# Patient Record
Sex: FEMALE | Race: BLACK OR AFRICAN AMERICAN | NOT HISPANIC OR LATINO | Employment: FULL TIME | URBAN - METROPOLITAN AREA
[De-identification: names, ages, dates, MRNs, and addresses within clinical notes are randomized per-mention and may not be internally consistent; named-entity substitution may affect disease eponyms.]

---

## 2020-04-21 DIAGNOSIS — Z01.84 ANTIBODY RESPONSE EXAMINATION: ICD-10-CM

## 2020-05-21 DIAGNOSIS — Z01.84 ANTIBODY RESPONSE EXAMINATION: ICD-10-CM

## 2020-06-20 DIAGNOSIS — Z01.84 ANTIBODY RESPONSE EXAMINATION: ICD-10-CM

## 2020-07-20 DIAGNOSIS — Z01.84 ANTIBODY RESPONSE EXAMINATION: ICD-10-CM

## 2020-08-19 DIAGNOSIS — Z01.84 ANTIBODY RESPONSE EXAMINATION: ICD-10-CM

## 2020-09-18 DIAGNOSIS — Z01.84 ANTIBODY RESPONSE EXAMINATION: ICD-10-CM

## 2020-10-18 DIAGNOSIS — Z01.84 ANTIBODY RESPONSE EXAMINATION: ICD-10-CM

## 2020-11-17 DIAGNOSIS — Z01.84 ANTIBODY RESPONSE EXAMINATION: ICD-10-CM

## 2021-01-16 ENCOUNTER — IMMUNIZATION (OUTPATIENT)
Dept: INTERNAL MEDICINE | Facility: CLINIC | Age: 36
End: 2021-01-16
Payer: COMMERCIAL

## 2021-01-16 DIAGNOSIS — Z23 NEED FOR VACCINATION: Primary | ICD-10-CM

## 2021-01-16 PROCEDURE — 91300 COVID-19, MRNA, LNP-S, PF, 30 MCG/0.3 ML DOSE VACCINE: CPT | Mod: PBBFAC

## 2021-01-27 ENCOUNTER — OFFICE VISIT (OUTPATIENT)
Dept: OPTOMETRY | Facility: CLINIC | Age: 36
End: 2021-01-27
Payer: COMMERCIAL

## 2021-01-27 DIAGNOSIS — Z04.9 DISEASE RULED OUT AFTER EXAMINATION: ICD-10-CM

## 2021-01-27 DIAGNOSIS — H52.203 ASTIGMATISM OF BOTH EYES, UNSPECIFIED TYPE: Primary | ICD-10-CM

## 2021-01-27 PROCEDURE — 99999 PR PBB SHADOW E&M-EST. PATIENT-LVL II: ICD-10-PCS | Mod: PBBFAC,,, | Performed by: OPTOMETRIST

## 2021-01-27 PROCEDURE — 92015 DETERMINE REFRACTIVE STATE: CPT | Mod: S$GLB,,, | Performed by: OPTOMETRIST

## 2021-01-27 PROCEDURE — 92004 COMPRE OPH EXAM NEW PT 1/>: CPT | Mod: S$GLB,,, | Performed by: OPTOMETRIST

## 2021-01-27 PROCEDURE — 92015 PR REFRACTION: ICD-10-PCS | Mod: S$GLB,,, | Performed by: OPTOMETRIST

## 2021-01-27 PROCEDURE — 92004 PR EYE EXAM, NEW PATIENT,COMPREHESV: ICD-10-PCS | Mod: S$GLB,,, | Performed by: OPTOMETRIST

## 2021-01-27 PROCEDURE — 99999 PR PBB SHADOW E&M-EST. PATIENT-LVL II: CPT | Mod: PBBFAC,,, | Performed by: OPTOMETRIST

## 2021-01-27 RX ORDER — LABETALOL 200 MG/1
200 TABLET, FILM COATED ORAL 2 TIMES DAILY
COMMUNITY
End: 2022-06-14 | Stop reason: ALTCHOICE

## 2021-02-06 ENCOUNTER — IMMUNIZATION (OUTPATIENT)
Dept: INTERNAL MEDICINE | Facility: CLINIC | Age: 36
End: 2021-02-06
Payer: MEDICAID

## 2021-02-06 DIAGNOSIS — Z23 NEED FOR VACCINATION: Primary | ICD-10-CM

## 2021-02-06 PROCEDURE — 91300 COVID-19, MRNA, LNP-S, PF, 30 MCG/0.3 ML DOSE VACCINE: ICD-10-PCS | Mod: ,,, | Performed by: INTERNAL MEDICINE

## 2021-02-06 PROCEDURE — 0002A COVID-19, MRNA, LNP-S, PF, 30 MCG/0.3 ML DOSE VACCINE: CPT | Mod: CV19,,, | Performed by: INTERNAL MEDICINE

## 2021-02-06 PROCEDURE — 91300 COVID-19, MRNA, LNP-S, PF, 30 MCG/0.3 ML DOSE VACCINE: CPT | Mod: ,,, | Performed by: INTERNAL MEDICINE

## 2021-02-06 PROCEDURE — 0002A COVID-19, MRNA, LNP-S, PF, 30 MCG/0.3 ML DOSE VACCINE: ICD-10-PCS | Mod: CV19,,, | Performed by: INTERNAL MEDICINE

## 2021-04-28 ENCOUNTER — PATIENT MESSAGE (OUTPATIENT)
Dept: RESEARCH | Facility: HOSPITAL | Age: 36
End: 2021-04-28

## 2021-12-21 ENCOUNTER — LAB VISIT (OUTPATIENT)
Dept: PRIMARY CARE CLINIC | Facility: OTHER | Age: 36
End: 2021-12-21

## 2021-12-21 DIAGNOSIS — R53.83 FATIGUE, UNSPECIFIED TYPE: Primary | ICD-10-CM

## 2021-12-21 DIAGNOSIS — R53.83 FATIGUE, UNSPECIFIED TYPE: ICD-10-CM

## 2021-12-21 LAB
CTP QC/QA: YES
SARS-COV-2 RDRP RESP QL NAA+PROBE: POSITIVE

## 2021-12-21 PROCEDURE — U0002: ICD-10-PCS | Mod: QW,,, | Performed by: PREVENTIVE MEDICINE

## 2021-12-21 PROCEDURE — U0002 COVID-19 LAB TEST NON-CDC: HCPCS | Mod: QW,,, | Performed by: PREVENTIVE MEDICINE

## 2021-12-24 ENCOUNTER — PATIENT MESSAGE (OUTPATIENT)
Dept: PRIMARY CARE CLINIC | Facility: CLINIC | Age: 36
End: 2021-12-24
Payer: MEDICAID

## 2022-06-10 ENCOUNTER — TELEPHONE (OUTPATIENT)
Dept: OBSTETRICS AND GYNECOLOGY | Facility: CLINIC | Age: 37
End: 2022-06-10
Payer: COMMERCIAL

## 2022-06-10 NOTE — TELEPHONE ENCOUNTER
----- Message from Meera Pond sent at 6/10/2022  2:54 PM CDT -----   Name of Who is Calling:     What is the request in detail: patient request call back in reference to  new patient appointment  Please contact to further discuss and advise      Can the clinic reply by [facility]:     [facility]  [phone]

## 2022-06-10 NOTE — TELEPHONE ENCOUNTER
----- Message from Meera Pond sent at 6/10/2022  2:58 PM CDT -----   Name of Who is Calling:     What is the request in detail:  patient request call back in reference to new patient appointment Please contact to further discuss and advise      Can the clinic reply by [facility]:     [facility]  [phone]

## 2022-06-13 NOTE — TELEPHONE ENCOUNTER
Returned call to the patient. Patient scheduled to see Dr. Montana and will keep that appointment for her annual well woman.

## 2022-06-14 ENCOUNTER — OFFICE VISIT (OUTPATIENT)
Dept: PRIMARY CARE CLINIC | Facility: CLINIC | Age: 37
End: 2022-06-14
Payer: COMMERCIAL

## 2022-06-14 VITALS
SYSTOLIC BLOOD PRESSURE: 149 MMHG | BODY MASS INDEX: 35.19 KG/M2 | TEMPERATURE: 99 F | WEIGHT: 191.25 LBS | DIASTOLIC BLOOD PRESSURE: 91 MMHG | HEIGHT: 62 IN | HEART RATE: 73 BPM | OXYGEN SATURATION: 100 %

## 2022-06-14 DIAGNOSIS — N76.0 ACUTE VAGINITIS: Primary | ICD-10-CM

## 2022-06-14 DIAGNOSIS — Z11.3 SCREEN FOR STD (SEXUALLY TRANSMITTED DISEASE): ICD-10-CM

## 2022-06-14 DIAGNOSIS — I10 UNCONTROLLED HYPERTENSION: ICD-10-CM

## 2022-06-14 PROCEDURE — 87591 N.GONORRHOEAE DNA AMP PROB: CPT | Performed by: FAMILY MEDICINE

## 2022-06-14 PROCEDURE — 3077F PR MOST RECENT SYSTOLIC BLOOD PRESSURE >= 140 MM HG: ICD-10-PCS | Mod: CPTII,S$GLB,, | Performed by: FAMILY MEDICINE

## 2022-06-14 PROCEDURE — 1159F PR MEDICATION LIST DOCUMENTED IN MEDICAL RECORD: ICD-10-PCS | Mod: CPTII,S$GLB,, | Performed by: FAMILY MEDICINE

## 2022-06-14 PROCEDURE — 87481 CANDIDA DNA AMP PROBE: CPT | Mod: 59 | Performed by: FAMILY MEDICINE

## 2022-06-14 PROCEDURE — 99203 OFFICE O/P NEW LOW 30 MIN: CPT | Mod: S$GLB,,, | Performed by: FAMILY MEDICINE

## 2022-06-14 PROCEDURE — 3077F SYST BP >= 140 MM HG: CPT | Mod: CPTII,S$GLB,, | Performed by: FAMILY MEDICINE

## 2022-06-14 PROCEDURE — 99203 PR OFFICE/OUTPT VISIT, NEW, LEVL III, 30-44 MIN: ICD-10-PCS | Mod: S$GLB,,, | Performed by: FAMILY MEDICINE

## 2022-06-14 PROCEDURE — 99999 PR PBB SHADOW E&M-EST. PATIENT-LVL III: ICD-10-PCS | Mod: PBBFAC,,, | Performed by: FAMILY MEDICINE

## 2022-06-14 PROCEDURE — 1159F MED LIST DOCD IN RCRD: CPT | Mod: CPTII,S$GLB,, | Performed by: FAMILY MEDICINE

## 2022-06-14 PROCEDURE — 1160F RVW MEDS BY RX/DR IN RCRD: CPT | Mod: CPTII,S$GLB,, | Performed by: FAMILY MEDICINE

## 2022-06-14 PROCEDURE — 3080F DIAST BP >= 90 MM HG: CPT | Mod: CPTII,S$GLB,, | Performed by: FAMILY MEDICINE

## 2022-06-14 PROCEDURE — 87801 DETECT AGNT MULT DNA AMPLI: CPT | Performed by: FAMILY MEDICINE

## 2022-06-14 PROCEDURE — 3008F PR BODY MASS INDEX (BMI) DOCUMENTED: ICD-10-PCS | Mod: CPTII,S$GLB,, | Performed by: FAMILY MEDICINE

## 2022-06-14 PROCEDURE — 1160F PR REVIEW ALL MEDS BY PRESCRIBER/CLIN PHARMACIST DOCUMENTED: ICD-10-PCS | Mod: CPTII,S$GLB,, | Performed by: FAMILY MEDICINE

## 2022-06-14 PROCEDURE — 3008F BODY MASS INDEX DOCD: CPT | Mod: CPTII,S$GLB,, | Performed by: FAMILY MEDICINE

## 2022-06-14 PROCEDURE — 99999 PR PBB SHADOW E&M-EST. PATIENT-LVL III: CPT | Mod: PBBFAC,,, | Performed by: FAMILY MEDICINE

## 2022-06-14 PROCEDURE — 3080F PR MOST RECENT DIASTOLIC BLOOD PRESSURE >= 90 MM HG: ICD-10-PCS | Mod: CPTII,S$GLB,, | Performed by: FAMILY MEDICINE

## 2022-06-14 PROCEDURE — 87491 CHLMYD TRACH DNA AMP PROBE: CPT | Mod: 59 | Performed by: FAMILY MEDICINE

## 2022-06-14 RX ORDER — FLUCONAZOLE 150 MG/1
150 TABLET ORAL DAILY
Qty: 1 TABLET | Refills: 0 | Status: SHIPPED | OUTPATIENT
Start: 2022-06-14 | End: 2022-06-15

## 2022-06-14 RX ORDER — METOPROLOL SUCCINATE 25 MG/1
25 TABLET, EXTENDED RELEASE ORAL DAILY
Qty: 30 TABLET | Refills: 1 | Status: SHIPPED | OUTPATIENT
Start: 2022-06-14 | End: 2022-07-07

## 2022-06-14 RX ORDER — METRONIDAZOLE 500 MG/1
500 TABLET ORAL EVERY 12 HOURS
Qty: 14 TABLET | Refills: 0 | Status: SHIPPED | OUTPATIENT
Start: 2022-06-14 | End: 2022-06-21

## 2022-06-14 NOTE — PROGRESS NOTES
Subjective:       Patient ID: Gina Dickson is a 36 y.o. female.    Chief Complaint: vaginal problems    HPI:  Patient reports having unprotected sex 5 days ago and has a creamy vaginal discharge with fishy odor x 4 days.  She has history of recurrent vaginitis.  Patient also with chronic HTN. She admits to non-compliance with medication.  Review of Systems   Constitutional: Negative for fever.   Gastrointestinal: Rectal pain: vaginitis.   Genitourinary: Positive for vaginal discharge. Negative for dysuria, pelvic pain and vaginal pain.   Hematological: Bruises/bleeds easily.         Objective:      Physical Exam  Constitutional:       Appearance: Normal appearance.   Pulmonary:      Effort: Pulmonary effort is normal.   Abdominal:      Palpations: Abdomen is soft.      Tenderness: There is no abdominal tenderness.   Genitourinary:     Labia:         Right: No lesion.         Left: No rash or lesion.       Vagina: Vaginal discharge (minimal white discharge) present. No erythema.      Cervix: No friability, lesion or erythema.   Neurological:      Mental Status: She is alert.   Psychiatric:         Mood and Affect: Mood normal.         Assessment:       Problem List Items Addressed This Visit    None     Visit Diagnoses     Acute vaginitis    -  Primary    Relevant Medications    metroNIDAZOLE (FLAGYL) 500 MG tablet    Other Relevant Orders    Vaginosis Screen by DNA Probe (Completed)    Uncontrolled hypertension        Relevant Medications    metoprolol succinate (TOPROL-XL) 25 MG 24 hr tablet    Screen for STD (sexually transmitted disease)        Relevant Orders    C. trachomatis/N. gonorrhoeae by AMP DNA (Completed)          Plan:     Gina was seen today for vaginal problems.    Diagnoses and all orders for this visit:    Acute vaginitis  -     Vaginosis Screen by DNA Probe  -     metroNIDAZOLE (FLAGYL) 500 MG tablet; Take 1 tablet (500 mg total) by mouth every 12 (twelve) hours. for 7 days  -      fluconazole (DIFLUCAN) 150 MG Tab; Take 1 tablet (150 mg total) by mouth once daily. for 1 day    Uncontrolled hypertension  -     metoprolol succinate (TOPROL-XL) 25 MG 24 hr tablet; Take 1 tablet (25 mg total) by mouth once daily.    Screen for STD (sexually transmitted disease)  -     C. trachomatis/N. gonorrhoeae by AMP DNA

## 2022-06-17 LAB
BACTERIAL VAGINOSIS DNA: POSITIVE
C TRACH DNA SPEC QL NAA+PROBE: NOT DETECTED
CANDIDA GLABRATA DNA: NEGATIVE
CANDIDA KRUSEI DNA: NEGATIVE
CANDIDA RRNA VAG QL PROBE: NEGATIVE
N GONORRHOEA DNA SPEC QL NAA+PROBE: NOT DETECTED
T VAGINALIS RRNA GENITAL QL PROBE: NEGATIVE

## 2022-06-29 ENCOUNTER — PATIENT MESSAGE (OUTPATIENT)
Dept: PRIMARY CARE CLINIC | Facility: CLINIC | Age: 37
End: 2022-06-29
Payer: COMMERCIAL

## 2022-07-07 ENCOUNTER — TELEPHONE (OUTPATIENT)
Dept: OBSTETRICS AND GYNECOLOGY | Facility: CLINIC | Age: 37
End: 2022-07-07
Payer: COMMERCIAL

## 2022-07-07 ENCOUNTER — PATIENT MESSAGE (OUTPATIENT)
Dept: OBSTETRICS AND GYNECOLOGY | Facility: CLINIC | Age: 37
End: 2022-07-07
Payer: COMMERCIAL

## 2022-07-07 NOTE — TELEPHONE ENCOUNTER
----- Message from Rosa Isela Patel sent at 7/7/2022  8:29 AM CDT -----  Regarding: Reschedule appointment  Who Called:LATRICIA MACK [[id number]]        Who Left Message for Patient:         Does the patient know what this is regarding?        Best Call Back Number: [phone]        Additional Information:

## 2022-07-12 ENCOUNTER — CLINICAL SUPPORT (OUTPATIENT)
Dept: PRIMARY CARE CLINIC | Facility: CLINIC | Age: 37
End: 2022-07-12
Payer: COMMERCIAL

## 2022-07-12 VITALS — SYSTOLIC BLOOD PRESSURE: 122 MMHG | HEART RATE: 71 BPM | DIASTOLIC BLOOD PRESSURE: 78 MMHG | OXYGEN SATURATION: 97 %

## 2022-07-12 DIAGNOSIS — I10 ESSENTIAL HYPERTENSION: Primary | ICD-10-CM

## 2022-07-12 PROCEDURE — 99499 UNLISTED E&M SERVICE: CPT | Mod: S$GLB,,, | Performed by: FAMILY MEDICINE

## 2022-07-12 PROCEDURE — 99499 NO LOS: ICD-10-PCS | Mod: S$GLB,,, | Performed by: FAMILY MEDICINE

## 2022-07-12 PROCEDURE — 99999 PR PBB SHADOW E&M-EST. PATIENT-LVL II: CPT | Mod: PBBFAC,,,

## 2022-07-12 PROCEDURE — 99999 PR PBB SHADOW E&M-EST. PATIENT-LVL II: ICD-10-PCS | Mod: PBBFAC,,,

## 2022-07-12 RX ORDER — FLUCONAZOLE 150 MG/1
150 TABLET ORAL
Qty: 1 TABLET | Refills: 0 | Status: SHIPPED | OUTPATIENT
Start: 2022-07-12 | End: 2022-07-12 | Stop reason: SDUPTHER

## 2022-07-12 RX ORDER — METRONIDAZOLE 7.5 MG/G
1 GEL VAGINAL NIGHTLY
Qty: 70 G | Refills: 0 | Status: SHIPPED | OUTPATIENT
Start: 2022-07-12 | End: 2022-07-12

## 2022-07-12 NOTE — PROGRESS NOTES
"Gina Dickson 36 y.o. female is here today for Blood Pressure check.   History of HTN yes.    Review of patient's allergies indicates:  No Known Allergies  Creatinine   Date Value Ref Range Status   05/09/2016 0.7 0.5 - 1.4 mg/dL Final     Sodium   Date Value Ref Range Status   05/09/2016 138 136 - 145 mmol/L Final     Potassium   Date Value Ref Range Status   05/09/2016 3.7 3.5 - 5.1 mmol/L Final   ]  Patient denies taking blood pressure medications on a regular basis at the same time of the day.     Current Outpatient Medications:     cetirizine (ZYRTEC) 10 MG tablet, Take 1 tablet (10 mg total) by mouth every evening. for 10 days, Disp: 10 tablet, Rfl: 0    metoprolol succinate (TOPROL-XL) 25 MG 24 hr tablet, TAKE 1 TABLET BY MOUTH EVERY DAY, Disp: 30 tablet, Rfl: 1  Does patient have record of home blood pressure readings no. .   Last dose of blood pressure medication was taken at N/A. Patient states she hasn"t started  Medication yet.  Patient is asymptomatic.   Complains of none..    Vitals:    07/12/22 1117   BP: 122/78   BP Location: Left arm   Patient Position: Sitting   BP Method: Medium (Manual)   Pulse: 71   SpO2: 97%         Dr. Santoro  informed of nurse visit.     "

## 2022-07-12 NOTE — PROGRESS NOTES
Pt seen on 6/14/22 for vaginitis. Swabs positive for BV, treated with fluconazole x1 and oral metronidazole.    Pt apparently still symptomatic. She reports that symptoms are most likely a yeast infection, thick white creamy discharge, mild pruritis/discomfirt. Does not feel like BV. Normally gets fluconazole x3 times. Discussed with pt new prescription for fluconazole x3 times sent to pharmacy. If still no improvement, will switch to terconazole vaginal cream.

## 2022-07-13 RX ORDER — FLUCONAZOLE 150 MG/1
150 TABLET ORAL
Qty: 3 TABLET | Refills: 0 | Status: SHIPPED | OUTPATIENT
Start: 2022-07-13 | End: 2022-08-24

## 2022-07-13 RX ORDER — METRONIDAZOLE 7.5 MG/G
1 GEL VAGINAL NIGHTLY
Qty: 70 G | Refills: 0 | Status: CANCELLED | OUTPATIENT
Start: 2022-07-13 | End: 2022-07-18

## 2022-07-18 DIAGNOSIS — I10 UNCONTROLLED HYPERTENSION: ICD-10-CM

## 2022-07-18 RX ORDER — METOPROLOL SUCCINATE 25 MG/1
25 TABLET, EXTENDED RELEASE ORAL DAILY
Qty: 30 TABLET | Refills: 1 | Status: SHIPPED | OUTPATIENT
Start: 2022-07-18 | End: 2022-08-24 | Stop reason: SDUPTHER

## 2022-07-19 ENCOUNTER — PATIENT MESSAGE (OUTPATIENT)
Dept: PRIMARY CARE CLINIC | Facility: CLINIC | Age: 37
End: 2022-07-19
Payer: COMMERCIAL

## 2022-07-21 ENCOUNTER — TELEPHONE (OUTPATIENT)
Dept: PRIMARY CARE CLINIC | Facility: CLINIC | Age: 37
End: 2022-07-21
Payer: COMMERCIAL

## 2022-08-11 ENCOUNTER — PATIENT MESSAGE (OUTPATIENT)
Dept: OBSTETRICS AND GYNECOLOGY | Facility: CLINIC | Age: 37
End: 2022-08-11

## 2022-08-11 ENCOUNTER — LAB VISIT (OUTPATIENT)
Dept: PRIMARY CARE CLINIC | Facility: CLINIC | Age: 37
End: 2022-08-11
Payer: COMMERCIAL

## 2022-08-11 ENCOUNTER — OFFICE VISIT (OUTPATIENT)
Dept: OBSTETRICS AND GYNECOLOGY | Facility: CLINIC | Age: 37
End: 2022-08-11
Payer: COMMERCIAL

## 2022-08-11 VITALS
BODY MASS INDEX: 32.86 KG/M2 | WEIGHT: 178.56 LBS | DIASTOLIC BLOOD PRESSURE: 70 MMHG | SYSTOLIC BLOOD PRESSURE: 120 MMHG | HEIGHT: 62 IN

## 2022-08-11 DIAGNOSIS — Z11.3 SCREEN FOR STD (SEXUALLY TRANSMITTED DISEASE): ICD-10-CM

## 2022-08-11 DIAGNOSIS — N89.8 VAGINAL DISCHARGE: ICD-10-CM

## 2022-08-11 DIAGNOSIS — Z11.3 SCREEN FOR STD (SEXUALLY TRANSMITTED DISEASE): Primary | ICD-10-CM

## 2022-08-11 PROCEDURE — 36415 PR COLLECTION VENOUS BLOOD,VENIPUNCTURE: ICD-10-PCS | Mod: S$GLB,,, | Performed by: OBSTETRICS & GYNECOLOGY

## 2022-08-11 PROCEDURE — 3078F DIAST BP <80 MM HG: CPT | Mod: CPTII,S$GLB,, | Performed by: OBSTETRICS & GYNECOLOGY

## 2022-08-11 PROCEDURE — 3074F SYST BP LT 130 MM HG: CPT | Mod: CPTII,S$GLB,, | Performed by: OBSTETRICS & GYNECOLOGY

## 2022-08-11 PROCEDURE — 86695 HERPES SIMPLEX TYPE 1 TEST: CPT | Performed by: OBSTETRICS & GYNECOLOGY

## 2022-08-11 PROCEDURE — 87389 HIV-1 AG W/HIV-1&-2 AB AG IA: CPT | Performed by: OBSTETRICS & GYNECOLOGY

## 2022-08-11 PROCEDURE — 3008F BODY MASS INDEX DOCD: CPT | Mod: CPTII,S$GLB,, | Performed by: OBSTETRICS & GYNECOLOGY

## 2022-08-11 PROCEDURE — 36415 COLL VENOUS BLD VENIPUNCTURE: CPT | Performed by: OBSTETRICS & GYNECOLOGY

## 2022-08-11 PROCEDURE — 80074 ACUTE HEPATITIS PANEL: CPT | Performed by: OBSTETRICS & GYNECOLOGY

## 2022-08-11 PROCEDURE — 87591 N.GONORRHOEAE DNA AMP PROB: CPT | Mod: 59 | Performed by: OBSTETRICS & GYNECOLOGY

## 2022-08-11 PROCEDURE — 3044F PR MOST RECENT HEMOGLOBIN A1C LEVEL <7.0%: ICD-10-PCS | Mod: CPTII,S$GLB,, | Performed by: OBSTETRICS & GYNECOLOGY

## 2022-08-11 PROCEDURE — 3074F PR MOST RECENT SYSTOLIC BLOOD PRESSURE < 130 MM HG: ICD-10-PCS | Mod: CPTII,S$GLB,, | Performed by: OBSTETRICS & GYNECOLOGY

## 2022-08-11 PROCEDURE — 86592 SYPHILIS TEST NON-TREP QUAL: CPT | Performed by: OBSTETRICS & GYNECOLOGY

## 2022-08-11 PROCEDURE — 36415 COLL VENOUS BLD VENIPUNCTURE: CPT | Mod: S$GLB,,, | Performed by: OBSTETRICS & GYNECOLOGY

## 2022-08-11 PROCEDURE — 87491 CHLMYD TRACH DNA AMP PROBE: CPT | Performed by: OBSTETRICS & GYNECOLOGY

## 2022-08-11 PROCEDURE — 99203 PR OFFICE/OUTPT VISIT, NEW, LEVL III, 30-44 MIN: ICD-10-PCS | Mod: S$GLB,,, | Performed by: OBSTETRICS & GYNECOLOGY

## 2022-08-11 PROCEDURE — 3044F HG A1C LEVEL LT 7.0%: CPT | Mod: CPTII,S$GLB,, | Performed by: OBSTETRICS & GYNECOLOGY

## 2022-08-11 PROCEDURE — 3078F PR MOST RECENT DIASTOLIC BLOOD PRESSURE < 80 MM HG: ICD-10-PCS | Mod: CPTII,S$GLB,, | Performed by: OBSTETRICS & GYNECOLOGY

## 2022-08-11 PROCEDURE — 87481 CANDIDA DNA AMP PROBE: CPT | Mod: 59 | Performed by: OBSTETRICS & GYNECOLOGY

## 2022-08-11 PROCEDURE — 99999 PR PBB SHADOW E&M-EST. PATIENT-LVL III: ICD-10-PCS | Mod: PBBFAC,,, | Performed by: OBSTETRICS & GYNECOLOGY

## 2022-08-11 PROCEDURE — 99203 OFFICE O/P NEW LOW 30 MIN: CPT | Mod: S$GLB,,, | Performed by: OBSTETRICS & GYNECOLOGY

## 2022-08-11 PROCEDURE — 99999 PR PBB SHADOW E&M-EST. PATIENT-LVL III: CPT | Mod: PBBFAC,,, | Performed by: OBSTETRICS & GYNECOLOGY

## 2022-08-11 PROCEDURE — 3008F PR BODY MASS INDEX (BMI) DOCUMENTED: ICD-10-PCS | Mod: CPTII,S$GLB,, | Performed by: OBSTETRICS & GYNECOLOGY

## 2022-08-12 LAB
HIV 1+2 AB+HIV1 P24 AG SERPL QL IA: NEGATIVE
RPR SER QL: NORMAL

## 2022-08-14 LAB
C TRACH DNA SPEC QL NAA+PROBE: NOT DETECTED
N GONORRHOEA DNA SPEC QL NAA+PROBE: NOT DETECTED

## 2022-08-15 LAB
HSV1 IGG SERPL QL IA: POSITIVE
HSV2 IGG SERPL QL IA: POSITIVE

## 2022-08-16 LAB
BACTERIAL VAGINOSIS DNA: POSITIVE
CANDIDA GLABRATA DNA: NEGATIVE
CANDIDA KRUSEI DNA: NEGATIVE
CANDIDA RRNA VAG QL PROBE: NEGATIVE
T VAGINALIS RRNA GENITAL QL PROBE: NEGATIVE

## 2022-08-18 ENCOUNTER — TELEPHONE (OUTPATIENT)
Dept: OBSTETRICS AND GYNECOLOGY | Facility: CLINIC | Age: 37
End: 2022-08-18
Payer: COMMERCIAL

## 2022-08-18 DIAGNOSIS — B96.89 BV (BACTERIAL VAGINOSIS): Primary | ICD-10-CM

## 2022-08-18 DIAGNOSIS — N76.0 BV (BACTERIAL VAGINOSIS): Primary | ICD-10-CM

## 2022-08-18 RX ORDER — METRONIDAZOLE 7.5 MG/G
1 GEL VAGINAL DAILY
Qty: 1 G | Refills: 0 | Status: SHIPPED | OUTPATIENT
Start: 2022-08-18 | End: 2022-08-25

## 2022-08-19 ENCOUNTER — TELEPHONE (OUTPATIENT)
Dept: OBSTETRICS AND GYNECOLOGY | Facility: CLINIC | Age: 37
End: 2022-08-19
Payer: COMMERCIAL

## 2022-08-22 ENCOUNTER — PATIENT MESSAGE (OUTPATIENT)
Dept: OBSTETRICS AND GYNECOLOGY | Facility: CLINIC | Age: 37
End: 2022-08-22
Payer: COMMERCIAL

## 2022-08-24 ENCOUNTER — OFFICE VISIT (OUTPATIENT)
Dept: PRIMARY CARE CLINIC | Facility: CLINIC | Age: 37
End: 2022-08-24
Payer: COMMERCIAL

## 2022-08-24 ENCOUNTER — LAB VISIT (OUTPATIENT)
Dept: PRIMARY CARE CLINIC | Facility: CLINIC | Age: 37
End: 2022-08-24
Payer: COMMERCIAL

## 2022-08-24 ENCOUNTER — PATIENT OUTREACH (OUTPATIENT)
Dept: ADMINISTRATIVE | Facility: OTHER | Age: 37
End: 2022-08-24
Payer: COMMERCIAL

## 2022-08-24 VITALS
HEART RATE: 65 BPM | OXYGEN SATURATION: 100 % | BODY MASS INDEX: 33.13 KG/M2 | WEIGHT: 181.13 LBS | DIASTOLIC BLOOD PRESSURE: 100 MMHG | SYSTOLIC BLOOD PRESSURE: 140 MMHG

## 2022-08-24 DIAGNOSIS — F41.8 DEPRESSION WITH ANXIETY: ICD-10-CM

## 2022-08-24 DIAGNOSIS — Z82.69 FAMILY HISTORY OF SYSTEMIC LUPUS ERYTHEMATOSUS (SLE) IN MOTHER: ICD-10-CM

## 2022-08-24 DIAGNOSIS — I10 UNCONTROLLED HYPERTENSION: ICD-10-CM

## 2022-08-24 DIAGNOSIS — Z00.00 WELL ADULT EXAM: ICD-10-CM

## 2022-08-24 DIAGNOSIS — Z00.00 WELL ADULT EXAM: Primary | ICD-10-CM

## 2022-08-24 LAB
ALBUMIN SERPL BCP-MCNC: 3.7 G/DL (ref 3.5–5.2)
ALP SERPL-CCNC: 109 U/L (ref 55–135)
ALT SERPL W/O P-5'-P-CCNC: 12 U/L (ref 10–44)
ANION GAP SERPL CALC-SCNC: 6 MMOL/L (ref 8–16)
AST SERPL-CCNC: 16 U/L (ref 10–40)
BILIRUB SERPL-MCNC: 0.6 MG/DL (ref 0.1–1)
BUN SERPL-MCNC: 8 MG/DL (ref 6–20)
CALCIUM SERPL-MCNC: 9.3 MG/DL (ref 8.7–10.5)
CHLORIDE SERPL-SCNC: 106 MMOL/L (ref 95–110)
CHOLEST SERPL-MCNC: 182 MG/DL (ref 120–199)
CHOLEST/HDLC SERPL: 4.1 {RATIO} (ref 2–5)
CO2 SERPL-SCNC: 27 MMOL/L (ref 23–29)
CREAT SERPL-MCNC: 0.8 MG/DL (ref 0.5–1.4)
EST. GFR  (NO RACE VARIABLE): >60 ML/MIN/1.73 M^2
ESTIMATED AVG GLUCOSE: 85 MG/DL (ref 68–131)
GLUCOSE SERPL-MCNC: 89 MG/DL (ref 70–110)
HBA1C MFR BLD: 4.6 % (ref 4–5.6)
HDLC SERPL-MCNC: 44 MG/DL (ref 40–75)
HDLC SERPL: 24.2 % (ref 20–50)
LDLC SERPL CALC-MCNC: 126.4 MG/DL (ref 63–159)
NONHDLC SERPL-MCNC: 138 MG/DL
POTASSIUM SERPL-SCNC: 4.2 MMOL/L (ref 3.5–5.1)
PROT SERPL-MCNC: 7.3 G/DL (ref 6–8.4)
SODIUM SERPL-SCNC: 139 MMOL/L (ref 136–145)
TRIGL SERPL-MCNC: 58 MG/DL (ref 30–150)

## 2022-08-24 PROCEDURE — 3077F PR MOST RECENT SYSTOLIC BLOOD PRESSURE >= 140 MM HG: ICD-10-PCS | Mod: CPTII,S$GLB,, | Performed by: FAMILY MEDICINE

## 2022-08-24 PROCEDURE — 1159F MED LIST DOCD IN RCRD: CPT | Mod: CPTII,S$GLB,, | Performed by: FAMILY MEDICINE

## 2022-08-24 PROCEDURE — 86038 ANTINUCLEAR ANTIBODIES: CPT | Performed by: FAMILY MEDICINE

## 2022-08-24 PROCEDURE — 3008F BODY MASS INDEX DOCD: CPT | Mod: CPTII,S$GLB,, | Performed by: FAMILY MEDICINE

## 2022-08-24 PROCEDURE — 1160F RVW MEDS BY RX/DR IN RCRD: CPT | Mod: CPTII,S$GLB,, | Performed by: FAMILY MEDICINE

## 2022-08-24 PROCEDURE — 1159F PR MEDICATION LIST DOCUMENTED IN MEDICAL RECORD: ICD-10-PCS | Mod: CPTII,S$GLB,, | Performed by: FAMILY MEDICINE

## 2022-08-24 PROCEDURE — 3080F PR MOST RECENT DIASTOLIC BLOOD PRESSURE >= 90 MM HG: ICD-10-PCS | Mod: CPTII,S$GLB,, | Performed by: FAMILY MEDICINE

## 2022-08-24 PROCEDURE — 99395 PR PREVENTIVE VISIT,EST,18-39: ICD-10-PCS | Mod: S$GLB,,, | Performed by: FAMILY MEDICINE

## 2022-08-24 PROCEDURE — 3044F HG A1C LEVEL LT 7.0%: CPT | Mod: CPTII,S$GLB,, | Performed by: FAMILY MEDICINE

## 2022-08-24 PROCEDURE — 3077F SYST BP >= 140 MM HG: CPT | Mod: CPTII,S$GLB,, | Performed by: FAMILY MEDICINE

## 2022-08-24 PROCEDURE — 36415 COLL VENOUS BLD VENIPUNCTURE: CPT | Mod: S$GLB,,, | Performed by: FAMILY MEDICINE

## 2022-08-24 PROCEDURE — 36415 PR COLLECTION VENOUS BLOOD,VENIPUNCTURE: ICD-10-PCS | Mod: S$GLB,,, | Performed by: FAMILY MEDICINE

## 2022-08-24 PROCEDURE — 3008F PR BODY MASS INDEX (BMI) DOCUMENTED: ICD-10-PCS | Mod: CPTII,S$GLB,, | Performed by: FAMILY MEDICINE

## 2022-08-24 PROCEDURE — 80053 COMPREHEN METABOLIC PANEL: CPT | Performed by: FAMILY MEDICINE

## 2022-08-24 PROCEDURE — 1160F PR REVIEW ALL MEDS BY PRESCRIBER/CLIN PHARMACIST DOCUMENTED: ICD-10-PCS | Mod: CPTII,S$GLB,, | Performed by: FAMILY MEDICINE

## 2022-08-24 PROCEDURE — 80061 LIPID PANEL: CPT | Performed by: FAMILY MEDICINE

## 2022-08-24 PROCEDURE — 3044F PR MOST RECENT HEMOGLOBIN A1C LEVEL <7.0%: ICD-10-PCS | Mod: CPTII,S$GLB,, | Performed by: FAMILY MEDICINE

## 2022-08-24 PROCEDURE — 3080F DIAST BP >= 90 MM HG: CPT | Mod: CPTII,S$GLB,, | Performed by: FAMILY MEDICINE

## 2022-08-24 PROCEDURE — 99999 PR PBB SHADOW E&M-EST. PATIENT-LVL IV: ICD-10-PCS | Mod: PBBFAC,,, | Performed by: FAMILY MEDICINE

## 2022-08-24 PROCEDURE — 83036 HEMOGLOBIN GLYCOSYLATED A1C: CPT | Performed by: FAMILY MEDICINE

## 2022-08-24 PROCEDURE — 99395 PREV VISIT EST AGE 18-39: CPT | Mod: S$GLB,,, | Performed by: FAMILY MEDICINE

## 2022-08-24 PROCEDURE — 99999 PR PBB SHADOW E&M-EST. PATIENT-LVL IV: CPT | Mod: PBBFAC,,, | Performed by: FAMILY MEDICINE

## 2022-08-24 RX ORDER — OXYCODONE AND ACETAMINOPHEN 5; 325 MG/1; MG/1
TABLET ORAL
COMMUNITY
End: 2022-08-24

## 2022-08-24 RX ORDER — ACETAMINOPHEN AND CODEINE PHOSPHATE 120; 12 MG/5ML; MG/5ML
1 SOLUTION ORAL DAILY
COMMUNITY
Start: 2022-07-26 | End: 2022-08-24

## 2022-08-24 RX ORDER — METOPROLOL SUCCINATE 25 MG/1
25 TABLET, EXTENDED RELEASE ORAL DAILY
Qty: 30 TABLET | Refills: 1 | Status: SHIPPED | OUTPATIENT
Start: 2022-08-24 | End: 2023-04-10 | Stop reason: SDUPTHER

## 2022-08-24 RX ORDER — CITALOPRAM 20 MG/1
20 TABLET, FILM COATED ORAL DAILY
Qty: 30 TABLET | Refills: 11 | Status: SHIPPED | OUTPATIENT
Start: 2022-08-24 | End: 2023-04-04

## 2022-08-24 NOTE — PROGRESS NOTES
Subjective:       Patient ID: Gina Dickson is a 36 y.o. female.    Chief Complaint: Annual Exam (Wants a check up on everything blood work needs to be done)    HPI:  Here for annual exam.  Patient with uncontrolled HTN since age 18.  Patient non-compliant with medication.  Patient reports depression and anxiety.  Reports 2 miscarriages over past 2 years and death of her aunt in 2020.  Patient reports weight loss 30 lbs with exercise over past 2 months.  Review of Systems   Constitutional: Negative for appetite change, chills, diaphoresis, fatigue and fever.   HENT: Negative for sinus pressure/congestion and trouble swallowing.    Eyes: Negative for pain and visual disturbance.   Respiratory: Negative for cough, chest tightness, shortness of breath and wheezing.    Cardiovascular: Negative for chest pain, palpitations and leg swelling.   Gastrointestinal: Negative for abdominal pain, blood in stool, constipation, diarrhea, nausea and vomiting.   Endocrine: Negative for polydipsia and polyphagia.   Genitourinary: Negative for difficulty urinating, dysuria, hematuria, menstrual problem, pelvic pain and vaginal discharge.   Musculoskeletal: Negative for back pain, joint swelling and neck pain.   Integumentary:  Negative for color change and rash.   Allergic/Immunologic: Negative for environmental allergies and food allergies.   Neurological: Positive for headaches. Negative for dizziness and numbness.   Hematological: Negative for adenopathy. Bruises/bleeds easily.   Psychiatric/Behavioral: Positive for dysphoric mood and sleep disturbance. Negative for suicidal ideas. The patient is nervous/anxious.          Objective:      Physical Exam  Constitutional:       Appearance: She is well-developed. She is obese.   HENT:      Head: Normocephalic and atraumatic.      Right Ear: Tympanic membrane normal.      Left Ear: Tympanic membrane normal.      Nose: Nose normal.      Mouth/Throat:      Mouth: Mucous membranes are  moist.   Neck:      Thyroid: No thyromegaly.   Cardiovascular:      Rate and Rhythm: Normal rate and regular rhythm.      Heart sounds: Normal heart sounds. No murmur heard.  Pulmonary:      Effort: Pulmonary effort is normal. No respiratory distress.      Breath sounds: Normal breath sounds. No wheezing or rales.   Abdominal:      General: Bowel sounds are normal. There is no distension.      Palpations: Abdomen is soft. There is no mass.      Tenderness: There is no abdominal tenderness.   Musculoskeletal:      Cervical back: Normal range of motion and neck supple.      Right lower leg: No edema.      Left lower leg: No edema.   Lymphadenopathy:      Cervical: No cervical adenopathy.   Skin:     General: Skin is warm and dry.      Findings: No rash.   Neurological:      Mental Status: She is alert and oriented to person, place, and time.   Psychiatric:         Attention and Perception: Attention normal.         Mood and Affect: Mood is anxious and depressed.         Speech: Speech normal.         Behavior: Behavior normal.         Thought Content: Thought content includes suicidal ideation. Thought content does not include suicidal plan.         Assessment:       Problem List Items Addressed This Visit        Psychiatric    Depression with anxiety    Relevant Medications    citalopram (CELEXA) 20 MG tablet    Other Relevant Orders    Ambulatory referral/consult to Primary Care Behavioral Health (Non-Opioids)       Cardiac/Vascular    Uncontrolled hypertension    Relevant Medications    metoprolol succinate (TOPROL-XL) 25 MG 24 hr tablet    Other Relevant Orders    Comprehensive Metabolic Panel    Lipid Panel    Hypertension [facility] ([facility]) Enrollment Order (Completed)    Hypertension Digital Medicine ([facility]): Assign Onboarding Questionnaires (Completed)      Other Visit Diagnoses     Well adult exam    -  Primary    Relevant Orders    Hemoglobin A1C    Family history of systemic lupus erythematosus (SLE) in  mother        Relevant Orders    CB Screen w/Reflex          Plan:     Gina was seen today for annual exam.    Diagnoses and all orders for this visit:    Well adult exam  -    Continue exercise.  Monitor diet, low sodium./   Hemoglobin A1C; Future    Uncontrolled hypertension  -     Comprehensive Metabolic Panel; Future  -     Lipid Panel; Future  -     Hypertension Digital Medicine (HDMP) Enrollment Order  -     Hypertension Digital Medicine ([facility]): Assign Onboarding Questionnaires  -     metoprolol succinate (TOPROL-XL) 25 MG 24 hr tablet; Take 1 tablet (25 mg total) by mouth once daily.  RTC 4 weeks for BP check    Depression with anxiety  -     Ambulatory referral/consult to Primary Care Behavioral Health (Non-Opioids); Future  -     citalopram (CELEXA) 20 MG tablet; Take 1 tablet (20 mg total) by mouth once daily.    Family history of systemic lupus erythematosus (SLE) in mother  -     CB Screen w/Reflex; Future

## 2022-08-25 LAB — ANA SER QL IF: NORMAL

## 2022-08-31 ENCOUNTER — PATIENT MESSAGE (OUTPATIENT)
Dept: OBSTETRICS AND GYNECOLOGY | Facility: CLINIC | Age: 37
End: 2022-08-31
Payer: COMMERCIAL

## 2022-08-31 ENCOUNTER — PATIENT OUTREACH (OUTPATIENT)
Dept: ADMINISTRATIVE | Facility: OTHER | Age: 37
End: 2022-08-31
Payer: COMMERCIAL

## 2022-09-01 ENCOUNTER — PATIENT MESSAGE (OUTPATIENT)
Dept: PRIMARY CARE CLINIC | Facility: CLINIC | Age: 37
End: 2022-09-01
Payer: COMMERCIAL

## 2022-09-01 DIAGNOSIS — B37.9 YEAST INFECTION: Primary | ICD-10-CM

## 2022-09-01 LAB
HAV IGM SERPL QL IA: NORMAL
HBV CORE IGM SERPL QL IA: NORMAL
HBV SURFACE AG SERPL QL IA: NORMAL
HCV AB SERPL QL IA: NORMAL

## 2022-09-01 RX ORDER — FLUCONAZOLE 150 MG/1
150 TABLET ORAL ONCE
Qty: 2 TABLET | Refills: 0 | Status: CANCELLED | OUTPATIENT
Start: 2022-09-01 | End: 2022-09-01

## 2022-09-02 ENCOUNTER — PATIENT MESSAGE (OUTPATIENT)
Dept: PRIMARY CARE CLINIC | Facility: CLINIC | Age: 37
End: 2022-09-02
Payer: COMMERCIAL

## 2022-09-02 RX ORDER — FLUCONAZOLE 150 MG/1
150 TABLET ORAL DAILY
Qty: 1 TABLET | Refills: 0 | Status: SHIPPED | OUTPATIENT
Start: 2022-09-02 | End: 2022-09-03

## 2022-09-05 NOTE — PROGRESS NOTES
HISTORY OF PRESENT ILLNESS:    Gina Dickson is a 36 y.o. female  Patient's last menstrual period was 2022. presents today complaining of vaginal discharge for  days.     Past Medical History:   Diagnosis Date    Fibroids     Hypertension        History reviewed. No pertinent surgical history.    MEDICATIONS AND ALLERGIES:      Current Outpatient Medications:     cetirizine (ZYRTEC) 10 MG tablet, Take 1 tablet (10 mg total) by mouth every evening. for 10 days, Disp: 10 tablet, Rfl: 0    citalopram (CELEXA) 20 MG tablet, Take 1 tablet (20 mg total) by mouth once daily., Disp: 30 tablet, Rfl: 11    metoprolol succinate (TOPROL-XL) 25 MG 24 hr tablet, Take 1 tablet (25 mg total) by mouth once daily., Disp: 30 tablet, Rfl: 1    Review of patient's allergies indicates:  No Known Allergies    Family History   Problem Relation Age of Onset    Lupus Mother     Hypertension Father     Cancer Maternal Aunt         colon, breast       Social History     Socioeconomic History    Marital status: Single   Tobacco Use    Smoking status: Former    Smokeless tobacco: Never   Substance and Sexual Activity    Alcohol use: No    Drug use: Yes     Types: Marijuana    Sexual activity: Yes     Partners: Male     Birth control/protection: None     Social Determinants of Health     Financial Resource Strain: Low Risk     Difficulty of Paying Living Expenses: Not hard at all   Food Insecurity: No Food Insecurity    Worried About Running Out of Food in the Last Year: Never true    Ran Out of Food in the Last Year: Never true   Transportation Needs: No Transportation Needs    Lack of Transportation (Medical): No    Lack of Transportation (Non-Medical): No   Physical Activity: Sufficiently Active    Days of Exercise per Week: 7 days    Minutes of Exercise per Session: 60 min   Stress: Stress Concern Present    Feeling of Stress : Very much   Social Connections: Moderately Isolated    Frequency of Communication with Friends  and Family: More than three times a week    Frequency of Social Gatherings with Friends and Family: More than three times a week    Attends [employer] Services: 1 to 4 times per year    Active Member of Clubs or Organizations: No    Attends Club or Organization Meetings: Never    Marital Status: Never    Housing Stability: Low Risk     Unable to Pay for Housing in the Last Year: No    Number of Places Lived in the Last Year: 1    Unstable Housing in the Last Year: No       COMPREHENSIVE GYN HISTORY:  PAP History: Denies abnormal Paps.  Infection History: Denies STDs. Denies PID.  Benign History: Denies uterine fibroids. Denies ovarian cysts. Denies endometriosis. Denies other conditions.  Cancer History: Denies cervical cancer. Denies uterine cancer or hyperplasia. Denies ovarian cancer. Denies vulvar cancer or pre-cancer. Denies vaginal cancer or pre-cancer. Denies breast cancer. Denies colon cancer.  Sexual Activity History: Reports currently being sexually active  Menstrual History: Every 28 days, flows for 4 days. Light flow.  Dysmenorrhea History: Denies dysmenorrhea.      ROS:  GENERAL: No weight changes. No swelling. No fatigue. No fever.  CARDIOVASCULAR: No chest pain. No shortness of breath. No leg cramps.   NEUROLOGICAL: No headaches. No vision changes.  BREASTS: No pain. No lumps. No discharge.  ABDOMEN: No pain. No nausea. No vomiting. No diarrhea. No constipation.  REPRODUCTIVE: No abnormal bleeding.   VULVA: No pain. No lesions. No itching.  VAGINA: No relaxation. No itching. No odor. No discharge. No lesions.  URINARY: No incontinence. No nocturia. No frequency. No dysuria.    PE:  APPEARANCE: Well nourished, well developed, in no acute distress.  AFFECT: WNL, alert and oriented x 3.  ABDOMEN: Soft. No tenderness or masses. No hepatosplenomegaly. No hernias.  PELVIC: Normal external female genitalia without lesions. Normal hair distribution. Adequate perineal body, normal urethral meatus. Vagina  pink and well rugated without lesions or discharge. Cervix pink without lesions, discharge or tenderness. No significant cystocele or rectocele. Bimanual exam shows uterus to be 4-6 weeks size, regular, mobile and nontender. Adnexa without masses or tenderness.    PROCEDURES:  Affirm  GC & CT     DIAGNOSIS:  Vaginitis    PLAN:Awaiting culture results.     COUNSELING:  Please counseled on vaginitis prevention including :  a. avoiding feminine products such as deoderant soaps, body wash, bubble bath, douches, scented toilet paper, deoderant tampons or pads, feminine wipes, chronic pad use, etc.  b. avoiding other vulvovaginal irritants such as long hot baths, humidity, tight, synthetic clothing, chlorine and sitting around in wet bathing suits  c. wearing cotton underwear, avoiding thong underwear and no underwear to bed  d. taking showers instead of baths and use a hair dryer on cool setting afterwards to dry  e. wearing cotton to exercise and shower immediately after exercise and change clothes  f. using polyurethane condoms without spermicide if sexually active and symptoms are triggered by intercourse    FOLLOW-UP with me for annual exam or prn.

## 2022-09-07 ENCOUNTER — PATIENT OUTREACH (OUTPATIENT)
Dept: ADMINISTRATIVE | Facility: OTHER | Age: 37
End: 2022-09-07
Payer: COMMERCIAL

## 2022-09-07 RX ORDER — FLUCONAZOLE 150 MG/1
150 TABLET ORAL ONCE
Qty: 1 TABLET | Refills: 0 | Status: SHIPPED | OUTPATIENT
Start: 2022-09-07 | End: 2022-09-07

## 2022-09-07 NOTE — PROGRESS NOTES
CHW - Follow Up    This Community Health Worker completed a follow up visit with patient via telephone today.  Pt/Caregiver reported: The pt stated that she did not schedule therapist apt yet  Community Health Worker provided: CHW encouraged the client to make an apt.  Follow up required: Yes   Follow-up Outreach - Due: 9/21/2022  CHW - Outreach Attempt    Community Health Worker left a voicemail message for 1st attempt to contact patient regarding: Follow up   Community Health Worker to attempt to contact patient on: 9/7/22

## 2022-10-04 ENCOUNTER — PATIENT MESSAGE (OUTPATIENT)
Dept: PRIMARY CARE CLINIC | Facility: CLINIC | Age: 37
End: 2022-10-04
Payer: COMMERCIAL

## 2022-10-04 ENCOUNTER — PATIENT OUTREACH (OUTPATIENT)
Dept: ADMINISTRATIVE | Facility: OTHER | Age: 37
End: 2022-10-04
Payer: COMMERCIAL

## 2022-10-04 NOTE — PROGRESS NOTES
CHW - Case Closure    This Community Health Worker spoke to patient via telephone today.   Pt/Caregiver reported: CHW provided the pt with information to the Employee Assistance Program.  Pt/Caregiver denied any additional needs at this time and agrees with episode closure at this time.  Provided patient with Community Health Worker's contact information and encouraged him/her to contact this Community Health Worker if additional needs arise.

## 2022-12-28 ENCOUNTER — PATIENT OUTREACH (OUTPATIENT)
Dept: ADMINISTRATIVE | Facility: HOSPITAL | Age: 37
End: 2022-12-28
Payer: COMMERCIAL

## 2023-03-10 ENCOUNTER — HOSPITAL ENCOUNTER (OUTPATIENT)
Dept: RADIOLOGY | Facility: HOSPITAL | Age: 38
Discharge: HOME | End: 2023-03-10
Attending: PHYSICIAN ASSISTANT
Payer: COMMERCIAL

## 2023-03-10 ENCOUNTER — OFFICE VISIT (OUTPATIENT)
Dept: ORTHOPEDICS | Facility: CLINIC | Age: 38
End: 2023-03-10
Payer: COMMERCIAL

## 2023-03-10 DIAGNOSIS — M79.644 FINGER PAIN, RIGHT: Primary | ICD-10-CM

## 2023-03-10 DIAGNOSIS — S63.641A RUPTURE OF ULNAR COLLATERAL LIGAMENT OF RIGHT THUMB, INITIAL ENCOUNTER: Primary | ICD-10-CM

## 2023-03-10 DIAGNOSIS — M79.644 FINGER PAIN, RIGHT: ICD-10-CM

## 2023-03-10 PROCEDURE — 99999 PR PBB SHADOW E&M-EST. PATIENT-LVL III: CPT | Mod: PBBFAC,,, | Performed by: PHYSICIAN ASSISTANT

## 2023-03-10 PROCEDURE — 1159F PR MEDICATION LIST DOCUMENTED IN MEDICAL RECORD: ICD-10-PCS | Mod: CPTII,S$GLB,, | Performed by: PHYSICIAN ASSISTANT

## 2023-03-10 PROCEDURE — 73130 X-RAY EXAM OF HAND: CPT | Mod: 26,RT,, | Performed by: RADIOLOGY

## 2023-03-10 PROCEDURE — 73130 XR HAND COMPLETE 3 VIEW RIGHT: ICD-10-PCS | Mod: 26,RT,, | Performed by: RADIOLOGY

## 2023-03-10 PROCEDURE — 99203 PR OFFICE/OUTPT VISIT, NEW, LEVL III, 30-44 MIN: ICD-10-PCS | Mod: S$GLB,,, | Performed by: PHYSICIAN ASSISTANT

## 2023-03-10 PROCEDURE — 99203 OFFICE O/P NEW LOW 30 MIN: CPT | Mod: S$GLB,,, | Performed by: PHYSICIAN ASSISTANT

## 2023-03-10 PROCEDURE — 99999 PR PBB SHADOW E&M-EST. PATIENT-LVL III: ICD-10-PCS | Mod: PBBFAC,,, | Performed by: PHYSICIAN ASSISTANT

## 2023-03-10 PROCEDURE — 73130 X-RAY EXAM OF HAND: CPT | Mod: TC,RT

## 2023-03-10 PROCEDURE — 1159F MED LIST DOCD IN RCRD: CPT | Mod: CPTII,S$GLB,, | Performed by: PHYSICIAN ASSISTANT

## 2023-03-10 RX ORDER — MELOXICAM 15 MG/1
15 TABLET ORAL DAILY
Qty: 30 TABLET | Refills: 0 | Status: SHIPPED | OUTPATIENT
Start: 2023-03-10 | End: 2023-04-27 | Stop reason: HOSPADM

## 2023-03-10 NOTE — PROGRESS NOTES
[facility]  History & Physical  Orthopedics    SUBJECTIVE:      Chief Complaint: Right thumb    Referring Provider: Dwight Collins Dr. is the supervising physician for this encounter/patient    History of Present Illness:  Patient is a 37 y.o. right hand dominant female who presents today with complaints of right thumb injury occurred on 3/4/23, while on vacation in [place], she was gripping a handrail and slipped/fell, this caused a hyper abduction injury to the thumb. She has noted pain, swelling and numbness/tingling off on in the thumb since the injury. Pain and instability when trying to  something. No surgical history on the hands.     [facility]    Onset of symptoms/DOI was 3/4/23.    Symptoms are aggravated by activity and movement.    Symptoms are alleviated by rest.    Symptoms consist of pain, swelling, decreased ROM, and numbness/tingling.    The patient rates their pain as a 8/10.    Attempted treatment(s) and/or interventions include activity modifications, rest, anti-inflammatory medications.     The patient denies any fevers, chills, N/V, D/C and presents for evaluation.       Past Medical History:   Diagnosis Date    Fibroids     Hypertension      No past surgical history on file.  Review of patient's allergies indicates:  No Known Allergies  Social History     Social History Narrative    Not on file     Family History   Problem Relation Age of Onset    Lupus Mother     Hypertension Father     Cancer Maternal Aunt         colon, breast         Current Outpatient Medications:     cetirizine (ZYRTEC) 10 MG tablet, Take 1 tablet (10 mg total) by mouth every evening. for 10 days, Disp: 10 tablet, Rfl: 0    citalopram (CELEXA) 20 MG tablet, Take 1 tablet (20 mg total) by mouth once daily., Disp: 30 tablet, Rfl: 11    meloxicam (MOBIC) 15 MG tablet, Take 1 tablet (15 mg total) by mouth once daily., Disp: 30 tablet, Rfl: 0    metoprolol  succinate (TOPROL-XL) 25 MG 24 hr tablet, Take 1 tablet (25 mg total) by mouth once daily., Disp: 30 tablet, Rfl: 1      Review of Systems:  Constitutional: no fever or chills  Eyes: no visual changes  ENT: no nasal congestion or sore throat  Respiratory: no cough or shortness of breath  Cardiovascular: no chest pain  Gastrointestinal: no nausea or vomiting, tolerating diet  Musculoskeletal: pain, soreness, numbness/tingling, and decreased ROM    OBJECTIVE:      Vital Signs (Most Recent):  There were no vitals filed for this visit.  There is no height or weight on file to calculate BMI.      Physical Exam:  Constitutional: The patient appears well-developed and well-nourished. No distress.   Skin: No lesions appreciated  Head: Normocephalic and atraumatic.   Nose: Nose normal.   Ears: No deformities seen  Eyes: Conjunctivae and EOM are normal.   Neck: No tracheal deviation present.   Cardiovascular: Normal rate and intact distal pulses.    Pulmonary/Chest: Effort normal. No respiratory distress.   Abdominal: There is no guarding.   Neurological: The patient is alert.   Psychiatric: The patient has a normal mood and affect.     Right Hand/Wrist Examination:    Observation/Inspection:  Swelling  Mild to the thumb    Deformity  none  Discoloration  none     Scars   none    Atrophy  none    HAND/WRIST EXAMINATION:  Finkelstein's Test   Neg  WHAT Test    Neg  Snuff box tenderness   Neg  Man's Test    Neg  Hook of Hamate Tenderness  Neg  CMC grind    Neg  Circumduction test   Neg  Acutely TTP to the ulnar boarder of the thumb MCP joint, TTP to the thumb A1 pulley. Laxity noted to the MCP with ulnar stressing.    Neurovascular Exam:  Digits WWP, brisk CR < 3s throughout  NVI motor/LTS to M/R/U nerves, radial pulse 2+  Tinel's Test - Carpal Tunnel  Neg  Tinel's Test - Cubital Tunnel  Neg  Phalen's Test    Neg  Median Nerve Compression Test Neg    ROM hand: decreased thumb motion due to pain, however, MCP and IP  flexion/extension intact.    ROM wrist full, painless    ROM elbow full, painless    Abdomen not guarded  Respirations nonlabored  Perfusion intact    Diagnostic Results:     Imaging - I independently viewed the patient's imaging as well as the radiology report.      FINDINGS:  No acute fractures.  Preserved bone density.  Preserved joint spaces.  No opaque soft tissue foreign bodies.  Soft tissue swelling dorsally at the level of the metacarpals and MCP articulations.     Impression:     As above    EMG - none    ASSESSMENT/PLAN:      37 y.o. yo female with Right thumb UCL injury    Plan: The patient and I had a thorough discussion today.  We discussed the working diagnosis as well as several other potential alternative diagnoses.  Treatment options were discussed, both conservative and surgical.  Conservative treatment options would include things such as activity modifications, workplace modifications, a period of rest, oral vs topical OTC and prescription anti-inflammatory medications, occupational therapy, splinting/bracing, immobilization, corticosteroid injections, and others.  Surgical options were discussed as well.     At this time, the patient would like to proceed with a trial of thumb spica brace given today, OT referral for custom orthosis and rehab, Mobic ordered today. If no improvement with therapy, MRI to evaluate ligament for possible surgical treatment.    Should the patient's symptoms worsen, persist, or fail to improve they should return for reevaluation and I would be happy to see them back anytime.           Please do not hesitate to reach out to us via email, phone, or MyChart with any questions, concerns, or feedback.

## 2023-03-13 ENCOUNTER — PATIENT OUTREACH (OUTPATIENT)
Dept: ADMINISTRATIVE | Facility: HOSPITAL | Age: 38
End: 2023-03-13
Payer: COMMERCIAL

## 2023-03-16 ENCOUNTER — CLINICAL SUPPORT (OUTPATIENT)
Dept: REHABILITATION | Facility: HOSPITAL | Age: 38
End: 2023-03-16
Payer: COMMERCIAL

## 2023-03-16 DIAGNOSIS — S63.641A RUPTURE OF ULNAR COLLATERAL LIGAMENT OF RIGHT THUMB, INITIAL ENCOUNTER: ICD-10-CM

## 2023-03-16 DIAGNOSIS — M25.641 DECREASED RANGE OF MOTION OF RIGHT THUMB: ICD-10-CM

## 2023-03-16 DIAGNOSIS — M79.644 PAIN OF RIGHT THUMB: ICD-10-CM

## 2023-03-16 PROCEDURE — 97165 OT EVAL LOW COMPLEX 30 MIN: CPT

## 2023-03-16 PROCEDURE — 97530 THERAPEUTIC ACTIVITIES: CPT

## 2023-03-16 PROCEDURE — L3913 HFO W/O JOINTS CF: HCPCS

## 2023-03-16 NOTE — PLAN OF CARE
[facility]  Occupational Therapy Initial Evaluation    Date: 3/16/2023  Name: Gina Dickson  Clinic Number: [id number]    Therapy Diagnosis:   Encounter Diagnoses   Name Primary?    Rupture of ulnar collateral ligament of right thumb, initial encounter     Pain of right thumb     Decreased range of motion of right thumb      Physician: Russo-Digeorge, Jamie L*    Physician Orders: eval and treat, Concern for Right thumb UCL injury, please eval/treat with custom orthosis and rehab.  Medical Diagnosis: Rupture of ulnar collateral ligament of right thumb, initial encounter [S63.641A]  Surgical Procedure and Date: n/a   Date of Injury/Onset: 3/4/23  Evaluation Date: 3/16/23  Insurance Authorization Period Expiration: 3/9/24  Plan of Care Expiration: 5/12/23  Date of Return to MD: not yet scheduled  Visit # / Visits authorized: 1 / 1  FOTO: 3/16/23   69% limitation    Precautions:  Standard and no lateral stress or forceful pinching with thumb    Time In: 11:00  Time Out: 12:00  Total Appointment Time (timed & untimed codes): 60 minutes      SUBJECTIVE     Date of Onset: 3/4/23    History of Current Condition/Mechanism of Injury: Brent reports: hyperabducted thumb while on a trip to [place], she was on climbing and let go and thumb got caught. Pt arrived today in forearm based pre robert thumb spica.She reports increased discomfort while wearing brace. She c/o high pain in thumb and also radiating up her arm.     Falls: no    Involved Side: right  Dominant Side: Left  Imaging:  x-ray on 3/10/23 indicated:  No acute fractures.  Preserved bone density.  Preserved joint spaces.  No opaque soft tissue foreign bodies.  Soft tissue swelling dorsally at the level of the metacarpals and MCP articulations.    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     Current Functional Status   Home/Living environment: lives with their family, but her significant other is off shore right now. (Has a 15 y/o and a 7 y/o)      Limitation of Functional Status as follows:   ADLs/IADLs:     - Feeding: I    - Bathing: more limited and requires increased time    - Dressing/Grooming: difficulty and limited with donning bra    - Driving: I    -currently able to type    -unable to grasp or pinch, limited use of R hand     Leisure: unable to exercise or lift weights    Pain:  Functional Pain Scale Rating 0-10: Current 8/10, worst did not rate, best 4/10   Location: R thumb, ulnar aspect of MP  Description: cold, shooting pains, achy, constant  Aggravating Factors: Flexing, Lifting, and pinching grasping, night time  Easing Factors: nothing     Patient's Goals for Therapy: to get back to regular function and wants to get back to weight lifting    Medical History:   Past Medical History:   Diagnosis Date    Fibroids     Hypertension        Surgical History:    has no past surgical history on file.    Medications:   has a current medication list which includes the following prescription(s): cetirizine, citalopram, meloxicam, and metoprolol succinate.    Allergies:   Review of patient's allergies indicates:  No Known Allergies       OBJECTIVE     Observation/Appearance: presents with pre robert forearm based thumb spica brace. Tenderness noted at ulnar aspect of thumb MP. Appears guarded of R hand.       Edema. Measured in centimeters.   3/16/2023 3/16/2023    Left Right   Thumb:     MP 7.6 7.9   Prox. Phalanx 6.4 6.4   IP 6.2 6.1   Distal Phalanx 5.7 5.9     Elbow and Wrist ROM. Measured in degrees.   3/16/2023 3/16/2023    Left Right   Wrist Ext/Flex WFL WFL   Wrist RD/UD WFL WFL     Hand ROM. Measured in degrees.  Digits 2-5 WNL   3/16/2023 3/16/2023    Left Right             Thumb: MP 57 15/27                IP 52 5       Rad ADD/ABD  55 40/50       Pal ADD/ABD 50 24/45          Opposition WNL To IF      Strength (Dynamometer) and Pinch Strength (Pinch Gauge)  Measured in pounds. **deferred  and pinching at this time   3/16/2023 3/16/2023    Left Right   Rung II Deferred at this time Deferred at this time   Key Pinch     3pt Pinch     2pt Pinch       Sensation: pt reports she does get numbness sometimes in her hand    3/16/2023 3/16/2023    Left Right   Benedict Katelin     Normal 1.65-2.83     Diminished Light Touch 3.22-3.61     Diminished Protective 3.84-4.31     Loss of Protective 4.56-6.65     Untestable >6.65     2 Point Discrimination     Static     Dynamic             Limitation/Restriction for FOTO Hand Survey    FOTO scores for Gina Dickson on 3/16/2023.   FOTO documents entered into [employer] - see Media section.    Limitation Score: 69%         Treatment   Total Treatment time (time-based codes) separate from Evaluation: 30 minutes    Brent received the treatments listed below:     Fabricated a hand based thumb orthosis to R hand, with IP free but MP joint protected to protect recent injury and maintain immobilization of thumb MP.  Instructed to wear full time with removal for HEP, bathing/hygiene.  Instructed to monitor for pain/pressure, increased edema, or redness and to contact office for adjustments as needed. Patient reported good understanding of orthotic wear and care schedule. Pt demonstrated independence donning and doffing orthosis. ()      Therapeutic activities to improve functional performance for 10  minutes, including:  Educated on precautions, orthosis wear, and HEP including the following:    AROM thumb:  Rad abd/add  IP blocks  Composite flx  Lifts- thumb only     X 10 reps each (gentle and pain free)         Patient Education and Home Exercises      Education provided:   - educated on precautions, avoiding pinching and lateral stress to thumb.   -educated on orthosis wear and on gentle, pain free  HEP    Written Home Exercises Provided: yes.  Exercises were reviewed and Bretn was able to demonstrate them prior to the end of the session.  Brent demonstrated good  understanding of the education provided. See EMR under Patient Instructions for exercises provided during therapy sessions.     Pt was advised to perform these exercises free of pain, and to stop performing them if pain occurs.    Patient/Family Education: role of OT, goals for OT, scheduling/cancellations - pt verbalized understanding. Discussed insurance limitations with patient.    ASSESSMENT     Gina Dickson is a 37 y.o. female referred to outpatient occupational therapy and presents with a medical diagnosis of right thumb UCL injury.  Patient presents with the following therapy deficits: Decreased ROM, Decreased  strength, Decreased pinch strength, Decreased muscle strength, Decreased functional hand use, Increased pain, Edema, and Joint Stiffness and demonstrates limitations as described in the chart below. Following medical record review it is determined that pt will benefit from occupational therapy services in order to maximize pain free and/or functional use of right hand. The following goals were discussed with the patient and patient is in agreement with them as to be addressed in the treatment plan. The patient's rehab potential is Good.     Anticipated barriers to occupational therapy: none at this time  Pt has no cultural, educational or language barriers to learning provided.    Profile and History Assessment of Occupational Performance Level of Clinical Decision Making Complexity Score   Occupational Profile:   Gina Dickson is a 37 y.o. female who lives with their family and is currently employed Gina Dickson has difficulty with  ADLs and IADLs as listed previously, which  Affecting herdaily functional abilities.      Comorbidities:    has a past medical history of Fibroids and Hypertension.    Medical and  Therapy History Review:   Expanded               Performance Deficits    Physical:  Joint Mobility  Joint Stability  Muscle Power/Strength  Muscle Endurance  Edema   Strength  Pinch Strength  Fine Motor Coordination  Pain    Cognitive:  No Deficits    Psychosocial:    Social Interaction  Habits  Routines     Clinical Decision Making:  moderate    Assessment Process:  Detailed Assessments    Modification/Need for Assistance:  Minimal-Moderate Modifications/Assistance    Intervention Selection:  Several Treatment Options       low  Based on PMHX, co morbidities , data from assessments and functional level of assistance required with task and clinical presentation directly impacting function.         Goals:   The following goals were discussed with the patient and patient is in agreement with them as to be addressed in the treatment plan.   Long Term Goals (LTGs); to be met by discharge.  1) Pt will report pain no higher than 2/10 with ADLs, grasping, and working out  2) Pt will have an improved FOTO score by at least 20 points  3) Pt will return to prior level of function  4)  strength assessed when appropriate and set goal  5) Pt will demonstrate improved R thumb AROM WFL for performing typical daily tasks.       Short Term Goals (STGs); to be met within 4 weeks (4/16/23).  1) Pt will report pain no higher than 5/10 with ADLs and daily tasks  2) Pt will demonstrate improved R thumb AROM by at least 10 degrees for improved grasp.  3) Pt will demonstrate or report independence with HEP and orthosis wear.     PLAN   Plan of Care Certification: 3/16/2023 to 5/12/23.     Outpatient Occupational Therapy 1-2 times weekly for 8 weeks to include the following interventions: Paraffin, Fluidotherapy, Manual therapy/joint mobilizations, Modalities for pain management, US 3 mhz, Therapeutic exercises/activities., Strengthening, Orthotic Fabrication/Fit/Training, Edema Control, Joint Protection, and Energy Conservation. Pt  requests to come 1x/week at this time.       RIYA Rockwell CHT      I CERTIFY THE NEED FOR THESE SERVICES FURNISHED UNDER THIS PLAN OF TREATMENT AND WHILE UNDER MY CARE  Physician's comments:      Physician's Signature: ___________________________________________________

## 2023-03-16 NOTE — PATIENT INSTRUCTIONS
[facility] - OCCUPATIONAL THERAPY  HOME EXERCISE PROGRAM         Complete the following exercises with 10 repetitions each, 4x/day.     AROM: Thumb IP Flexion / Extension  Brace thumb below tip joint. Bend joint as far as possible then straighten.    AROM: Thumb Composite Flexion   Bend both joints of thumb as far as possible. Try to touch base of little finger.    AROM: Radial Adduction / Abduction  Place your palm flat on the table. Move thumb out to side. Move back alongside index finger.                                        AROM: MP Extension   With palm on table, lift thumb up. Relax and lower thumb.      Copyright © [employer]. All rights reserved.

## 2023-03-22 ENCOUNTER — PATIENT MESSAGE (OUTPATIENT)
Dept: ADMINISTRATIVE | Facility: OTHER | Age: 38
End: 2023-03-22
Payer: COMMERCIAL

## 2023-03-22 ENCOUNTER — OFFICE VISIT (OUTPATIENT)
Dept: PRIMARY CARE CLINIC | Facility: CLINIC | Age: 38
End: 2023-03-22
Payer: COMMERCIAL

## 2023-03-22 VITALS
HEART RATE: 73 BPM | WEIGHT: 204.69 LBS | DIASTOLIC BLOOD PRESSURE: 89 MMHG | OXYGEN SATURATION: 100 % | SYSTOLIC BLOOD PRESSURE: 170 MMHG | BODY MASS INDEX: 37.67 KG/M2 | HEIGHT: 62 IN

## 2023-03-22 DIAGNOSIS — F41.8 DEPRESSION WITH ANXIETY: Primary | ICD-10-CM

## 2023-03-22 DIAGNOSIS — F43.21 GRIEF: ICD-10-CM

## 2023-03-22 PROCEDURE — 99214 OFFICE O/P EST MOD 30 MIN: CPT | Mod: S$GLB,,, | Performed by: FAMILY MEDICINE

## 2023-03-22 PROCEDURE — 1160F RVW MEDS BY RX/DR IN RCRD: CPT | Mod: CPTII,S$GLB,, | Performed by: FAMILY MEDICINE

## 2023-03-22 PROCEDURE — 1159F PR MEDICATION LIST DOCUMENTED IN MEDICAL RECORD: ICD-10-PCS | Mod: CPTII,S$GLB,, | Performed by: FAMILY MEDICINE

## 2023-03-22 PROCEDURE — 3079F PR MOST RECENT DIASTOLIC BLOOD PRESSURE 80-89 MM HG: ICD-10-PCS | Mod: CPTII,S$GLB,, | Performed by: FAMILY MEDICINE

## 2023-03-22 PROCEDURE — 99999 PR PBB SHADOW E&M-EST. PATIENT-LVL III: ICD-10-PCS | Mod: PBBFAC,,, | Performed by: FAMILY MEDICINE

## 2023-03-22 PROCEDURE — 99999 PR PBB SHADOW E&M-EST. PATIENT-LVL III: CPT | Mod: PBBFAC,,, | Performed by: FAMILY MEDICINE

## 2023-03-22 PROCEDURE — 3008F BODY MASS INDEX DOCD: CPT | Mod: CPTII,S$GLB,, | Performed by: FAMILY MEDICINE

## 2023-03-22 PROCEDURE — 3077F SYST BP >= 140 MM HG: CPT | Mod: CPTII,S$GLB,, | Performed by: FAMILY MEDICINE

## 2023-03-22 PROCEDURE — 1159F MED LIST DOCD IN RCRD: CPT | Mod: CPTII,S$GLB,, | Performed by: FAMILY MEDICINE

## 2023-03-22 PROCEDURE — 1160F PR REVIEW ALL MEDS BY PRESCRIBER/CLIN PHARMACIST DOCUMENTED: ICD-10-PCS | Mod: CPTII,S$GLB,, | Performed by: FAMILY MEDICINE

## 2023-03-22 PROCEDURE — 3008F PR BODY MASS INDEX (BMI) DOCUMENTED: ICD-10-PCS | Mod: CPTII,S$GLB,, | Performed by: FAMILY MEDICINE

## 2023-03-22 PROCEDURE — 99214 PR OFFICE/OUTPT VISIT, EST, LEVL IV, 30-39 MIN: ICD-10-PCS | Mod: S$GLB,,, | Performed by: FAMILY MEDICINE

## 2023-03-22 PROCEDURE — 3079F DIAST BP 80-89 MM HG: CPT | Mod: CPTII,S$GLB,, | Performed by: FAMILY MEDICINE

## 2023-03-22 PROCEDURE — 3077F PR MOST RECENT SYSTOLIC BLOOD PRESSURE >= 140 MM HG: ICD-10-PCS | Mod: CPTII,S$GLB,, | Performed by: FAMILY MEDICINE

## 2023-03-22 RX ORDER — ALPRAZOLAM 0.5 MG/1
0.5 TABLET ORAL 3 TIMES DAILY PRN
Qty: 30 TABLET | Refills: 0 | Status: SHIPPED | OUTPATIENT
Start: 2023-03-22 | End: 2023-04-04

## 2023-03-22 NOTE — PROGRESS NOTES
Subjective:       Patient ID: Gina Dickson is a 37 y.o. female.    Chief Complaint: Anxiety (de) and Depression    HPI:  Patient presents with worsening anxiety and depression.  Reports recent death of 3 loved ones over the past week, including her brother.  Non-compliant with Celexa and Metoprolol.    Review of Systems   Psychiatric/Behavioral:  Positive for dysphoric mood and sleep disturbance. Negative for suicidal ideas. The patient is nervous/anxious.        Objective:      Physical Exam  Constitutional:       Appearance: She is obese.   Neurological:      Mental Status: She is alert.   Psychiatric:         Attention and Perception: Attention normal.         Mood and Affect: Mood is anxious and depressed. Affect is tearful.         Speech: Speech normal.         Behavior: Behavior normal.         Thought Content: Thought content does not include suicidal ideation. Thought content does not include suicidal plan.         Cognition and Memory: Cognition normal.       Assessment:       Problem List Items Addressed This Visit          Psychiatric    Depression with anxiety - Primary    Relevant Medications    ALPRAZolam (XANAX) 0.5 MG tablet     Other Visit Diagnoses       Grief        Relevant Medications    ALPRAZolam (XANAX) 0.5 MG tablet            Plan:     Gina was seen today for anxiety and depression.    Diagnoses and all orders for this visit:    Depression with anxiety  Encourage patient to comply with Celexa daily for a minimum of 4-6 weeks.  She is to schedule for appointments with behavior health for counseling.  Completed forms for FMLA for 3 months.  Short-term supply of Xanax prescribed, number 30 tablets.  Patient aware that this will not be prescribed long-term.  -     ALPRAZolam (XANAX) 0.5 MG tablet; Take 1 tablet (0.5 mg total) by mouth 3 (three) times daily as needed for Anxiety or Insomnia.  Total visit time 45 minutes.    Grief  -     ALPRAZolam (XANAX) 0.5 MG tablet; Take 1  tablet (0.5 mg total) by mouth 3 (three) times daily as needed for Anxiety or Insomnia.

## 2023-03-23 ENCOUNTER — CLINICAL SUPPORT (OUTPATIENT)
Dept: REHABILITATION | Facility: HOSPITAL | Age: 38
End: 2023-03-23
Payer: COMMERCIAL

## 2023-03-23 DIAGNOSIS — M25.641 DECREASED RANGE OF MOTION OF RIGHT THUMB: ICD-10-CM

## 2023-03-23 DIAGNOSIS — M79.644 PAIN OF RIGHT THUMB: Primary | ICD-10-CM

## 2023-03-23 PROCEDURE — 97110 THERAPEUTIC EXERCISES: CPT

## 2023-03-23 PROCEDURE — 97022 WHIRLPOOL THERAPY: CPT

## 2023-03-23 PROCEDURE — 97140 MANUAL THERAPY 1/> REGIONS: CPT

## 2023-03-23 NOTE — PROGRESS NOTES
[facility]  Occupational Therapy Treatment Note    Date: 3/23/2023  Name: Gina Dickson  Clinic Number: [id number]    Therapy Diagnosis: No diagnosis found.  Physician: Russo-Digeorge, Jamie L*    Physician Orders: eval and treat, Concern for Right thumb UCL injury, please eval/treat with custom orthosis and rehab.  Medical Diagnosis: Rupture of ulnar collateral ligament of right thumb, initial encounter [A28.666Q]  Surgical Procedure and Date: n/a   Date of Injury/Onset: 3/4/23  Evaluation Date: 3/16/23  Insurance Authorization Period Expiration: 12/31/23  Plan of Care Expiration: 5/12/23  Date of Return to MD: not yet scheduled  Visit # / Visits authorized: 2 / 11  FOTO: 3/16/23   69% limitation     Precautions:  Standard and no lateral stress or forceful pinching with thumb    Time In: 2:30  Time Out: 3:10  Total Billable Time: 40 minutes      SUBJECTIVE     Pt reports: she cont to have pain in R thumb MP and also in IP now. She reports numbness and sensitivity to thenar and thumb.   She was compliant with home exercise program given last session.   Response to previous treatment:continued pain, but decreased pain in forearm  Functional change: unable to lift things or hold a glass of water.     Pain: did not rate but reported high pain  Location: right thumb MP and IP    OBJECTIVE   Objective Measures updated at progress report unless specified.    Edema. Measured in centimeters.    3/16/2023 3/16/2023     Left Right   Thumb:       MP 7.6 7.9   Prox. Phalanx 6.4 6.4   IP 6.2 6.1   Distal Phalanx 5.7 5.9      Elbow and Wrist ROM. Measured in degrees.    3/16/2023 3/16/2023     Left Right   Wrist Ext/Flex WFL WFL   Wrist RD/UD WFL WFL      Hand ROM. Measured in degrees.  Digits 2-5 WNL    3/16/2023 3/16/2023     Left Right                   Thumb: MP 57 15/27                IP 52 5       Rad ADD/ABD 55 40/50       Pal ADD/ABD 50 24/45          Opposition WNL To IF        Strength (Dynamometer) and Pinch Strength (Pinch Gauge)  Measured in pounds. **deferred  and pinching at this time    3/16/2023 3/16/2023     Left Right   Rung II Deferred at this time Deferred at this time   Key Pinch       3pt Pinch       2pt Pinch             Treatment     Brent received the treatments listed below:     Supervised modalities after being cleared for contradictions: Fluidotherapy   Fluidotherapy: To R hand for 10 min, continuous air, 110 deg, air speed 100 to decrease pain, edema & scar tissue and increased tissue extensibility.        Manual therapy techniques: gentle Manual Lymphatic Drainage were applied to the: R thumb/hand for 10 minutes, including:  Pt received manual lymphatic drainage and gentle STM to R thumb and hand as tolerated.   Pt received kinesio taping to R thumb, ulnar aspect with 15-25% stretch for pain and stiffness.     Therapeutic exercises to develop ROM and flexibility for 20 minutes, including:  Reviewed precautions and HEP  AROM thumb:  Rad abd/add  IP blocks  Composite flx  Lifts- thumb only       X 10 reps each (gentle and pain free)   Desensitization with sensory stick X 1' each              Patient Education and Home Exercises      Education provided:   - reviewed precautions, and not lifting or pinching with R thumb  -reviewed HEP and gentle massage to thumb  -educated desensitization with various textures and sensory stick  -cont orthosis wear full time except for HEP and hand hygiene  - Progress towards goals     Written Home Exercises Provided: Patient instructed to cont prior HEP.  Exercises were reviewed and Brent was able to demonstrate them prior to the end of the session.  Brent demonstrated fair  understanding of the HEP provided. See EMR under Patient Instructions for exercises provided during therapy sessions.      ASSESSMENT      Pt presents today for first visit after evaluation. She cont to report high pain in thumb, but reports pain in forearm  has resolved. She has been compliant with orthosis but reports sometimes accidentally tries lifting or picking things up with fingers and thumb. Swelling still noted in thumb. She also reports hypersensitivity and numbness on thumb and thenar. She participated fairy well.      Brent is progressing fairly towards her goals and there are no updates to goals at this time. Pt prognosis is Good.     Pt will continue to benefit from skilled outpatient occupational therapy to address the deficits listed in the problem list on initial evaluation, provide pt/family education and to maximize pt's level of independence in the home and community environment.     Pt's spiritual, cultural and educational needs considered and pt agreeable to plan of care and goals.    Anticipated barriers to occupational therapy: none at this time    Goals:  Long Term Goals (LTGs); to be met by discharge.  1) Pt will report pain no higher than 2/10 with ADLs, grasping, and working out  2) Pt will have an improved FOTO score by at least 20 points  3) Pt will return to prior level of function  4)  strength assessed when appropriate and set goal  5) Pt will demonstrate improved R thumb AROM WFL for performing typical daily tasks.         Short Term Goals (STGs); to be met within 4 weeks (4/16/23).  1) Pt will report pain no higher than 5/10 with ADLs and daily tasks  2) Pt will demonstrate improved R thumb AROM by at least 10 degrees for improved grasp.  3) Pt will demonstrate or report independence with HEP and orthosis wear.     PLAN     Continue skilled occupational therapy with individualized plan of care focusing on improving pain and ROM, and maximizing functional use of her hand    Updates/Grading for next session: cont to progress with ROM as tolerated and cont orthosis wear    RIYA Rockwell, RYANT

## 2023-03-30 ENCOUNTER — CLINICAL SUPPORT (OUTPATIENT)
Dept: REHABILITATION | Facility: HOSPITAL | Age: 38
End: 2023-03-30
Payer: COMMERCIAL

## 2023-03-30 DIAGNOSIS — M25.641 DECREASED RANGE OF MOTION OF RIGHT THUMB: ICD-10-CM

## 2023-03-30 DIAGNOSIS — M79.644 PAIN OF RIGHT THUMB: Primary | ICD-10-CM

## 2023-03-30 PROCEDURE — 97110 THERAPEUTIC EXERCISES: CPT

## 2023-03-30 PROCEDURE — 97022 WHIRLPOOL THERAPY: CPT

## 2023-03-30 PROCEDURE — 97140 MANUAL THERAPY 1/> REGIONS: CPT

## 2023-03-30 NOTE — PROGRESS NOTES
[facility]  Occupational Therapy Treatment Note    Date: 3/30/2023  Name: Gina Dickson  Clinic Number: [id number]    Therapy Diagnosis:   Encounter Diagnoses   Name Primary?    Pain of right thumb Yes    Decreased range of motion of right thumb      Physician: Russo-Digeorge, Jamie L*    Physician Orders: eval and treat, Concern for Right thumb UCL injury, please eval/treat with custom orthosis and rehab.  Medical Diagnosis: Rupture of ulnar collateral ligament of right thumb, initial encounter [S63.641A]  Surgical Procedure and Date: n/a   Date of Injury/Onset: 3/4/23  Evaluation Date: 3/16/23  Insurance Authorization Period Expiration: 12/31/23  Plan of Care Expiration: 5/12/23  Date of Return to MD: not yet scheduled  Visit # / Visits authorized: 3 / 11  FOTO: 3/16/23   69% limitation     Precautions:  Standard and no lateral stress or forceful pinching with thumb    Time In: 10:35  Time Out: 11:15  Total Billable Time: 40 minutes      SUBJECTIVE     Pt reports: she cont to have pain in R thumb MP at UCL. She reports numbness remains at dorsal 1st metacarpal and near MP. Pt reports she has been having to use her hand more lately.   She was compliant with home exercise program given last session.   Response to previous treatment:continued pain, improving swelling. Cont to be guarded  Functional change: unable to lift things or hold a glass of water.     Pain: did not rate but reported moderate to high pain  Location: right thumb MP and IP    OBJECTIVE   Objective Measures updated at progress report unless specified.    Edema. Measured in centimeters.    3/16/2023 3/16/2023     Left Right   Thumb:       MP 7.6 7.9   Prox. Phalanx 6.4 6.4   IP 6.2 6.1   Distal Phalanx 5.7 5.9      Elbow and Wrist ROM. Measured in degrees.    3/16/2023 3/16/2023     Left Right   Wrist Ext/Flex WFL WFL   Wrist RD/UD WFL WFL      Hand ROM. Measured in degrees.  Digits 2-5 WNL    3/16/2023 3/16/2023      Left Right                   Thumb: MP 57 15/27                IP 52 5       Rad ADD/ABD 55 40/50       Pal ADD/ABD 50 24/45          Opposition WNL To IF       Strength (Dynamometer) and Pinch Strength (Pinch Gauge)  Measured in pounds. **deferred  and pinching at this time    3/16/2023 3/16/2023     Left Right   Rung II Deferred at this time Deferred at this time   Key Pinch       3pt Pinch       2pt Pinch             Treatment     Brent received the treatments listed below:     Supervised modalities after being cleared for contradictions: Fluidotherapy   Fluidotherapy: To R hand for 10 min, continuous air, 110 deg, air speed 100 to decrease pain, edema & scar tissue and increased tissue extensibility.        Manual therapy techniques: gentle Manual Lymphatic Drainage were applied to the: R thumb/hand for 10 minutes, including:  Pt received manual lymphatic drainage and gentle STM to R thumb and hand as tolerated.     Therapeutic exercises to develop ROM and flexibility for 20 minutes, including:  Reviewed precautions and HEP  AROM thumb:  Rad abd/add  IP blocks  Composite flx  Lifts- thumb only       X 10 reps each (gentle and pain free)   Desensitization with sensory stick X 1' each (NT)              Patient Education and Home Exercises      Education provided:   - reviewed precautions, and not lifting or pinching with R thumb  -reviewed HEP and gentle massage to thumb  -educated desensitization with various textures and sensory stick  -cont orthosis wear full time except for HEP and hand hygiene  - Progress towards goals     Written Home Exercises Provided: Patient instructed to cont prior HEP.  Exercises were reviewed and Brent was able to demonstrate them prior to the end of the session.  Brent demonstrated fair  understanding of the HEP provided. See EMR under Patient Instructions for exercises provided during therapy sessions.      ASSESSMENT      Pt did not tolerate tx very well today, and cont  to be guarded. She cont to report moderate to high pain in thumb, but it is a bit better than last session. She has been compliant with orthosis but reports has been using her hand more. Swelling still noted in thumb bu appears to be improving. She also reports hypersensitivity and numbness on thumb. She participated fairy well.      Brent is progressing fairly towards her goals and there are no updates to goals at this time. Pt prognosis is Guarded.     Pt will continue to benefit from skilled outpatient occupational therapy to address the deficits listed in the problem list on initial evaluation, provide pt/family education and to maximize pt's level of independence in the home and community environment.     Pt's spiritual, cultural and educational needs considered and pt agreeable to plan of care and goals.    Anticipated barriers to occupational therapy: none at this time    Goals:  Long Term Goals (LTGs); to be met by discharge.  1) Pt will report pain no higher than 2/10 with ADLs, grasping, and working out  2) Pt will have an improved FOTO score by at least 20 points  3) Pt will return to prior level of function  4)  strength assessed when appropriate and set goal  5) Pt will demonstrate improved R thumb AROM WFL for performing typical daily tasks.         Short Term Goals (STGs); to be met within 4 weeks (4/16/23).  1) Pt will report pain no higher than 5/10 with ADLs and daily tasks  2) Pt will demonstrate improved R thumb AROM by at least 10 degrees for improved grasp.  3) Pt will demonstrate or report independence with HEP and orthosis wear.     PLAN     Continue skilled occupational therapy with individualized plan of care focusing on improving pain and ROM, and maximizing functional use of her hand    Updates/Grading for next session: cont to progress with ROM as tolerated and cont orthosis wear. Take measurements next session and do FOTO    RIYA Rockwell, RYANT

## 2023-03-31 ENCOUNTER — OFFICE VISIT (OUTPATIENT)
Dept: ORTHOPEDICS | Facility: CLINIC | Age: 38
End: 2023-03-31
Payer: COMMERCIAL

## 2023-03-31 VITALS — HEIGHT: 62 IN | WEIGHT: 204 LBS | BODY MASS INDEX: 37.54 KG/M2

## 2023-03-31 DIAGNOSIS — S63.641A RUPTURE OF ULNAR COLLATERAL LIGAMENT OF RIGHT THUMB, INITIAL ENCOUNTER: Primary | ICD-10-CM

## 2023-03-31 PROCEDURE — 99214 PR OFFICE/OUTPT VISIT, EST, LEVL IV, 30-39 MIN: ICD-10-PCS | Mod: S$GLB,,, | Performed by: PHYSICIAN ASSISTANT

## 2023-03-31 PROCEDURE — 99999 PR PBB SHADOW E&M-EST. PATIENT-LVL III: ICD-10-PCS | Mod: PBBFAC,,, | Performed by: PHYSICIAN ASSISTANT

## 2023-03-31 PROCEDURE — 1159F PR MEDICATION LIST DOCUMENTED IN MEDICAL RECORD: ICD-10-PCS | Mod: CPTII,S$GLB,, | Performed by: PHYSICIAN ASSISTANT

## 2023-03-31 PROCEDURE — 99999 PR PBB SHADOW E&M-EST. PATIENT-LVL III: CPT | Mod: PBBFAC,,, | Performed by: PHYSICIAN ASSISTANT

## 2023-03-31 PROCEDURE — 3008F BODY MASS INDEX DOCD: CPT | Mod: CPTII,S$GLB,, | Performed by: PHYSICIAN ASSISTANT

## 2023-03-31 PROCEDURE — 3008F PR BODY MASS INDEX (BMI) DOCUMENTED: ICD-10-PCS | Mod: CPTII,S$GLB,, | Performed by: PHYSICIAN ASSISTANT

## 2023-03-31 PROCEDURE — 1159F MED LIST DOCD IN RCRD: CPT | Mod: CPTII,S$GLB,, | Performed by: PHYSICIAN ASSISTANT

## 2023-03-31 PROCEDURE — 99214 OFFICE O/P EST MOD 30 MIN: CPT | Mod: S$GLB,,, | Performed by: PHYSICIAN ASSISTANT

## 2023-03-31 RX ORDER — MELOXICAM 15 MG/1
15 TABLET ORAL DAILY
Qty: 30 TABLET | Refills: 0 | Status: SHIPPED | OUTPATIENT
Start: 2023-03-31 | End: 2023-04-27 | Stop reason: HOSPADM

## 2023-03-31 RX ORDER — ACETAMINOPHEN 500 MG
1000 TABLET ORAL EVERY 8 HOURS PRN
Qty: 60 TABLET | Refills: 0 | Status: SHIPPED | OUTPATIENT
Start: 2023-03-31 | End: 2023-04-27 | Stop reason: HOSPADM

## 2023-03-31 RX ORDER — TRAMADOL HYDROCHLORIDE 50 MG/1
50 TABLET ORAL EVERY 12 HOURS PRN
Qty: 20 TABLET | Refills: 0 | Status: SHIPPED | OUTPATIENT
Start: 2023-03-31 | End: 2023-04-27 | Stop reason: HOSPADM

## 2023-03-31 NOTE — PROGRESS NOTES
[facility]  History & Physical  Orthopedics    SUBJECTIVE:      Chief Complaint: Right thumb    Referring Provider: Christa Santoro MD Dr. Dunbar is the supervising physician for this encounter/patient    History of Present Illness:  Patient is a 37 y.o. right hand dominant female who presents today with complaints of right thumb injury occurred on 3/4/23, while on vacation in [place], she was gripping a handrail and slipped/fell, this caused a hyper abduction injury to the thumb. She has noted pain, swelling and numbness/tingling off on in the thumb since the injury. Pain and instability when trying to  something. No surgical history on the hands.     Interval History 3/31/23: the patient returns for continued treatment of her right thumb injury, concern for ucl injury, just 4 weeks from injury. She has started OT, has completed 3 sessions thus far. She reports that her forearm pain is improving, but the thumb pain has remained the same. Difficulty with motion due to pain. She is wearing her orthosis, but has trouble getting through OT due to pain.    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    Onset of symptoms/DOI was 3/4/23.    Symptoms are aggravated by activity and movement.    Symptoms are alleviated by rest.    Symptoms consist of pain, swelling, decreased ROM, and numbness/tingling.    The patient rates their pain as a 7/10    Attempted treatment(s) and/or interventions include activity modifications, rest, anti-inflammatory medications, orthosis and OT.     The patient denies any fevers, chills, N/V, D/C and presents for evaluation.       Past Medical History:   Diagnosis Date    Fibroids     Hypertension      No past surgical history on file.  Review of patient's allergies indicates:  No Known Allergies  Social History     Social History Narrative    Not on file     Family History   Problem Relation Age of Onset    Lupus Mother     Hypertension Father     Cancer Maternal  Aunt         colon, breast         Current Outpatient Medications:     ALPRAZolam (XANAX) 0.5 MG tablet, Take 1 tablet (0.5 mg total) by mouth 3 (three) times daily as needed for Anxiety or Insomnia., Disp: 30 tablet, Rfl: 0    cetirizine (ZYRTEC) 10 MG tablet, Take 1 tablet (10 mg total) by mouth every evening. for 10 days, Disp: 10 tablet, Rfl: 0    citalopram (CELEXA) 20 MG tablet, Take 1 tablet (20 mg total) by mouth once daily., Disp: 30 tablet, Rfl: 11    meloxicam (MOBIC) 15 MG tablet, Take 1 tablet (15 mg total) by mouth once daily., Disp: 30 tablet, Rfl: 0    metoprolol succinate (TOPROL-XL) 25 MG 24 hr tablet, Take 1 tablet (25 mg total) by mouth once daily., Disp: 30 tablet, Rfl: 1      Review of Systems:  Constitutional: no fever or chills  Eyes: no visual changes  ENT: no nasal congestion or sore throat  Respiratory: no cough or shortness of breath  Cardiovascular: no chest pain  Gastrointestinal: no nausea or vomiting, tolerating diet  Musculoskeletal: pain, soreness, numbness/tingling, and decreased ROM    OBJECTIVE:      Vital Signs (Most Recent):  There were no vitals filed for this visit.  There is no height or weight on file to calculate BMI.      Physical Exam:  Constitutional: The patient appears well-developed and well-nourished. No distress.   Skin: No lesions appreciated  Head: Normocephalic and atraumatic.   Nose: Nose normal.   Ears: No deformities seen  Eyes: Conjunctivae and EOM are normal.   Neck: No tracheal deviation present.   Cardiovascular: Normal rate and intact distal pulses.    Pulmonary/Chest: Effort normal. No respiratory distress.   Abdominal: There is no guarding.   Neurological: The patient is alert.   Psychiatric: The patient has a normal mood and affect.     Right Hand/Wrist Examination:    Observation/Inspection:  Swelling  Mild to the thumb    Deformity  none  Discoloration  none     Scars   none    Atrophy  none    HAND/WRIST EXAMINATION:  Finkelstein's Test   Neg  WHAT  Test    Neg  Snuff box tenderness   Neg  Man's Test    Neg  Hook of Hamate Tenderness  Neg  CMC grind    Neg  Circumduction test   Neg  Acutely TTP to the ulnar boarder of the thumb MCP joint, NTTP to the thumb A1 pulley, mild TTP to the CMC. Laxity noted to the MCP with ulnar stressing.    Neurovascular Exam:  Digits WWP, brisk CR < 3s throughout  NVI motor/LTS to M/R/U nerves, radial pulse 2+  Tinel's Test - Carpal Tunnel  Neg  Tinel's Test - Cubital Tunnel  Neg  Phalen's Test    Neg  Median Nerve Compression Test Neg    ROM hand: decreased thumb motion due to pain, however, MCP and IP flexion/extension intact.    ROM wrist full, painless    ROM elbow full, painless    Abdomen not guarded  Respirations nonlabored  Perfusion intact    Diagnostic Results:     Imaging - I independently viewed the patient's imaging as well as the radiology report.      FINDINGS:  No acute fractures.  Preserved bone density.  Preserved joint spaces.  No opaque soft tissue foreign bodies.  Soft tissue swelling dorsally at the level of the metacarpals and MCP articulations.     Impression:     As above    EMG - none    ASSESSMENT/PLAN:      37 y.o. yo female with Right thumb UCL injury    Plan: The patient and I had a thorough discussion today.  We discussed the working diagnosis as well as several other potential alternative diagnoses.  Treatment options were discussed, both conservative and surgical.  Conservative treatment options would include things such as activity modifications, workplace modifications, a period of rest, oral vs topical OTC and prescription anti-inflammatory medications, occupational therapy, splinting/bracing, immobilization, corticosteroid injections, and others.  Surgical options were discussed as well.     At this time, the patient would like to proceed with MRI of the thumb for further evaluation, although I do explain that the results may not change the treatment plan. I will have her see Dr. Elias after  MRI for results and evaluation for treatment plan changes. Mobic 15mg, Tylenol 500mg and Tramadol 50mg ordered today. Continue with orthosis, ice/heat and OT.    Should the patient's symptoms worsen, persist, or fail to improve they should return for reevaluation and I would be happy to see them back anytime.           Please do not hesitate to reach out to us via email, phone, or MyChart with any questions, concerns, or feedback.

## 2023-04-02 ENCOUNTER — PATIENT MESSAGE (OUTPATIENT)
Dept: PRIMARY CARE CLINIC | Facility: CLINIC | Age: 38
End: 2023-04-02
Payer: COMMERCIAL

## 2023-04-03 ENCOUNTER — TELEPHONE (OUTPATIENT)
Dept: ORTHOPEDICS | Facility: CLINIC | Age: 38
End: 2023-04-03
Payer: COMMERCIAL

## 2023-04-03 NOTE — TELEPHONE ENCOUNTER
----- Message from Jamie L. Russo-Digeorge, PA-C sent at 4/3/2023 11:47 AM CDT -----  This prescription is ordered for injury  ----- Message -----  From: Silvina Mcknight MA  Sent: 4/3/2023   8:31 AM CDT  To: Jamie L. Russo-Digeorge, PA-C      ----- Message -----  From: Carlyn Neumann  Sent: 3/31/2023  11:07 AM CDT  To: Russo Digeorge Jamie Staff [facility] is calling to inform Dr that pt is also  on another RX and needs clarification on     RX traMADoL (ULTRAM) 50 mg tablet        [facility] [address] [place], [place] [address] - [address]  [address] [place] [address]  Phone: [phone] Fax: [phone]

## 2023-04-04 ENCOUNTER — TELEPHONE (OUTPATIENT)
Dept: ORTHOPEDICS | Facility: CLINIC | Age: 38
End: 2023-04-04
Payer: COMMERCIAL

## 2023-04-04 ENCOUNTER — OFFICE VISIT (OUTPATIENT)
Dept: PSYCHIATRY | Facility: CLINIC | Age: 38
End: 2023-04-04
Payer: COMMERCIAL

## 2023-04-04 VITALS
HEART RATE: 71 BPM | WEIGHT: 206.81 LBS | BODY MASS INDEX: 37.82 KG/M2 | DIASTOLIC BLOOD PRESSURE: 107 MMHG | SYSTOLIC BLOOD PRESSURE: 151 MMHG

## 2023-04-04 DIAGNOSIS — F43.21 GRIEF: ICD-10-CM

## 2023-04-04 DIAGNOSIS — F33.1 DEPRESSION, MAJOR, RECURRENT, MODERATE: Primary | ICD-10-CM

## 2023-04-04 DIAGNOSIS — F41.9 ANXIETY DISORDER, UNSPECIFIED TYPE: ICD-10-CM

## 2023-04-04 PROCEDURE — 3008F BODY MASS INDEX DOCD: CPT | Mod: CPTII,S$GLB,, | Performed by: PSYCHIATRY & NEUROLOGY

## 2023-04-04 PROCEDURE — 3077F PR MOST RECENT SYSTOLIC BLOOD PRESSURE >= 140 MM HG: ICD-10-PCS | Mod: CPTII,S$GLB,, | Performed by: PSYCHIATRY & NEUROLOGY

## 2023-04-04 PROCEDURE — 3080F DIAST BP >= 90 MM HG: CPT | Mod: CPTII,S$GLB,, | Performed by: PSYCHIATRY & NEUROLOGY

## 2023-04-04 PROCEDURE — 3077F SYST BP >= 140 MM HG: CPT | Mod: CPTII,S$GLB,, | Performed by: PSYCHIATRY & NEUROLOGY

## 2023-04-04 PROCEDURE — 3008F PR BODY MASS INDEX (BMI) DOCUMENTED: ICD-10-PCS | Mod: CPTII,S$GLB,, | Performed by: PSYCHIATRY & NEUROLOGY

## 2023-04-04 PROCEDURE — 99205 PR OFFICE/OUTPT VISIT, NEW, LEVL V, 60-74 MIN: ICD-10-PCS | Mod: S$GLB,,, | Performed by: PSYCHIATRY & NEUROLOGY

## 2023-04-04 PROCEDURE — 99999 PR PBB SHADOW E&M-EST. PATIENT-LVL III: CPT | Mod: PBBFAC,,, | Performed by: PSYCHIATRY & NEUROLOGY

## 2023-04-04 PROCEDURE — 3080F PR MOST RECENT DIASTOLIC BLOOD PRESSURE >= 90 MM HG: ICD-10-PCS | Mod: CPTII,S$GLB,, | Performed by: PSYCHIATRY & NEUROLOGY

## 2023-04-04 PROCEDURE — 99205 OFFICE O/P NEW HI 60 MIN: CPT | Mod: S$GLB,,, | Performed by: PSYCHIATRY & NEUROLOGY

## 2023-04-04 PROCEDURE — 99999 PR PBB SHADOW E&M-EST. PATIENT-LVL III: ICD-10-PCS | Mod: PBBFAC,,, | Performed by: PSYCHIATRY & NEUROLOGY

## 2023-04-04 RX ORDER — ALPRAZOLAM 0.5 MG/1
.25-.5 TABLET ORAL 2 TIMES DAILY PRN
Qty: 60 TABLET | Refills: 0 | Status: SHIPPED | OUTPATIENT
Start: 2023-04-04 | End: 2023-05-08 | Stop reason: SDUPTHER

## 2023-04-04 RX ORDER — BUPROPION HYDROCHLORIDE 150 MG/1
150 TABLET ORAL DAILY
Qty: 90 TABLET | Refills: 0 | Status: SHIPPED | OUTPATIENT
Start: 2023-04-04 | End: 2023-05-08

## 2023-04-04 NOTE — TELEPHONE ENCOUNTER
Called and spoke with the pt. Informed pt not to take Tramadol & Xanax together as per Buck LORENZO advisjace. Advised to take it at night times as needed if increased pain, not with Xanax.  Pt verbalized understanding and was thankful.

## 2023-04-04 NOTE — PROGRESS NOTES
PSYCHIATRIC EVALUATION     Disclaimer: Evaluation and treatment is based on information presented to date. Any new information may affect assessment and findings.     Name: Gina Dickson  Age: 37 y.o.  : 1985    Note:Face to Face time with patient: 75  minutes of total time spent on the encounter, which includes face to face time and non-face to face time preparing to see the patient including but not limited to: 1) Obtaining and/or reviewing separately obtained history, 2) Documenting clinical information in the electronic or other health record, 3) Independently  reviewing / interpreting results as clinically indicated ; 4) discussing medication options including risks and benefits as well as 5) recommendations  for therapeutic interventions and 5) where appropriate additional educational resources (ex: reference books / websites); 6) communicating assessment and plan of care to the patient/family/caregiver  7) explaining importance of keeping appts ; and 8) rescheduling IF miss appt  IF acute concerns to call 911 or go to nearest ER.     Referred by: (Whose idea to come see Psychiatry) : [facility] Christa Santoro MD      CHIEF COMPLAINT :  depression anxiety grief (as brother shot killed / mar 20 2023    HISTORY:         Gina Dickson a 37 y.o.  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    Dr. Santoro gave her 10 days' worth Xanax 0.5 mg 3 times a day number 30 / again such was in addition to the Celexa which the patient said she only took for a few days.  She did not like present she has not counseling    Brought outpatient programming but she says she does care for group.  (We  talked about of her  here which a referral was made today).    By my review of [place] prescription monitoring patient has not had any history of benzo within many years.      Recent  3-22-23 note from: [facility] Christa Santoro MD     Encourage patient to comply with Celexa daily for a minimum of 4-6 weeks.  She is to schedule for appointments with behavior health for counseling.  Completed forms for FMLA for 3 months.  Short-term supply of Xanax prescribed, number 30 tablets.  Patient aware that this will not be prescribed long-term.    >>    Current Issues:   took week not like way feel; celex  / nothing over  decade    Prior  Behavioral health:  Patient has not seen mental health many years ago she saw someone but does not call only other contact she is [facility]; no history suicide     Patient initially presents as smiling happy affect seen in my office asked about circumstances clinic gets tearful talks about her 40-year-old brother Josue Garcia Jr who she states was shot kill some type dry by she does note that the brother had just gotten state Corrections after 30 years or so.      Says that the brother resumed to be going through  school     Patient states had other losses within the last couple years.  She states 20 20 dad's youngest sister passed in  2 cousins  was an overdose on meditation 1 was said to be a murder in 2021    Focus on Self Ratings   Quick Inventory of Depressive Symptomalogy Self-Report (QID-SR)  Chung lane al, Biol Psychiatry (2003) 54: 573-83.    INSTRUCTIONS: Please select the one response to each item that is most appropriate to how you have been feeling over the past 7 days.    Question Statement Choices Answer   Falling asleep: 0 = I never took longer than 30 minutes to fall asleep.  1 = I took at least 30 minutes to fall asleep, less than half the time              (3 days or less out of  the past 7 days).  2 = I took at least 30 minutes to fall asleep, more than half the time            (4 days or more out of the past 7 days).  3 = I took more than 60 minutes to fall asleep, more than half the time           (4 days or more out of the past 7 days). 3   Sleep during the night: 0 = I didn't wake up at night.  1 = I had a restless, light sleep, briefly waking up a few times each night.  2 = I woke up at least once a night, but I got back to sleep easily.  3 = I woke up more than once a night and stayed awake for 20 mins or more, more than half the time (4 days or more out of the past 7 days). 3   Waking up too early: 0 = Most of the time, I woke up no more than 30 minutes before my scheduled time.  1 = More than half the time (4 days or more out of the past 7 days), I woke up more than 30 minutes before my scheduled time.  2 = I almost always woke up at least one hour or so before my scheduled time, but I got back to sleep eventually.  3 = I woke up at least one hour before my scheduled time, and couldn't get back to sleep. 3   Sleeping too much: 0 = I slept no longer than 7-8 hours/night, without napping during the day.  1 = I slept no longer than 10 hours in a 24-hour period including naps.  2 = I slept no longer than 12 hours in a 24-hour period including naps.  3 = I slept longer than 12 hours in a 24-hour period including naps. 0     Highest score on items 1-4: 3    5. Feeling sad: 0 = I didn't feel sad.  1 = I felt sad less than half the time (3 days or less out of the past 7 days).  2 = I felt sad more than half the time (4 days or more out of the past 7 days).  3 = I felt sad nearly all of the time. 2               Item #5 Total: 2    Complete either 6 or 7 (NOT BOTH); Bala the unselected option as N/A   6. Decreased appetite: 0 = There was no change in my usual appetite.  1 = I ate somewhat less often or smaller amounts of food than usual.  2 = I ate much less than usual and only by forcing  myself to eat.  3 = I rarely ate within a 24-hour period, and only by really forcing myself to eat or when others persuaded me to eat. 2   7. Increased appetite: 0 = There was no change in my usual appetite.  1 = I felt a need to eat more frequently than usual.  2 = I regularly ate more often and/or greater amounts of food than usual.  3 = I felt driven to overeat both at mealtime and between meals. 3     Complete either 6 or 7 (NOT BOTH); Bala the unselected option as N/A   8. Decreased weight (within the last 14 days): 0 = My weight has not changed.  1 = I feel as if I've had a slight weight loss.  2 = I've lost 2 pounds (about 1 kilo) or more.  3 = I've lost 5 pounds (about 2 kilos) or more. 0   9. Increased weight (within the last 14 days): 0 = My weight has not changed.  1 = I feel as if I've had a slight weight gain.  2 = I've gained 2 pounds (about 1 kilo) or more.  3 = I've gained 5 pounds (about 2 kilos) or more. 3     Highest score on items 6-9: 3    10. Concentration & decision-makin = There was no change in my usual ability to concentrate or make decisions.  1 = I occasionally felt indecisive or found that my attention wandered.  2 = Most of the time, I found it hard to focus or to make decisions.  3 = I couldn't concentrate well enough to read or I couldn't make even minor decisions. 2   11. Perception of myself: 0 = I saw myself as equally worthwhile and deserving as other people.  1 = I put the blame on myself more than usual.  2 = For the most part, I believed that I caused problems for others.  3 = I thought almost constantly about major and minor defects in myself. 0   12. Thoughts of my own death or suicide: 0 = I didn't think of suicide or death.  1 = I felt that life was empty or wondered if it was worth living.  2 = I thought of suicide or death several times for several minutes over the past 7 days.  3 = I thought of suicide or death several times a day in some detail, or I made specific  plans for suicide or actually tried to take my life. 0   13. General interest: 0 = There was no change from usual in how interested I was in other people or activities.  1 = I noticed that I was less interested in other people or activities.  2 = I found I had interest in only one or two of the activities I used to do.  3 = I had virtually no interest in the activities I used to do. 1   14. Energy level: 0 = There was no change in my usual level of energy.  1 = I got tired more easily than usual.  2 = I had to make a big effort to start or finish my usual daily activities (for example: shopping, homework, cooking or going to work).  3 = I really couldn't carry out most of my usual daily activities because I just didn't have the energy. 2                 Items #10-14 Total: 5    15. Feeling more sluggish than usual: 0 = I thought, spoke, and moved at my usual pace.  1 = I found that my thinking was more sluggish than usual or my voice sounded dull or flat.  2 = It took me several seconds to respond to most questions and I was sure my thinking was more sluggish than usual.  3 = I was often unable to respond to questions without forcing myself. 2   16. Feeling restless (agitated, not relaxed, fidgety): 0 = I didn't feel restless.  1 = I was often fidgety, wringing my hands, or needed to change my sitting position.  2 = I had sudden urges to move about and was quite restless.  3 = At times, I was unable to stay seated and needed to pace around. 3     Highest score on items 15-16: 3    Sum the item scores for a total score. Total score range 0-27.     Total Score: 16  1-5 = No depression  6-10 = Mild depression  11-15 = Moderate depression  16-20 = Severe depression  21-27 = Very severe depression     GAD7 4/4/2023   1. Feeling nervous, anxious, or on edge? 3   2. Not being able to stop or control worrying? 3   3. Worrying too much about different things? 3   4. Trouble relaxing? 2   5. Being so restless that it is hard  "to sit still? 2   6. Becoming easily annoyed or irritable? 3   7. Feeling afraid as if something awful might happen? 1   PEG-7 Score 17      Self Rating Scales: (Such submitted for scanning) :     Quick Inventory of Depression (QID-Short Form): 16  generalized anxiety disorder scale: 17   The [employer] Framework: a "bio-psycho-social" screening form for use as clinical raw data. / (submitted for scanning)       Physical Abuse: N    Sexual abuse  N    Other Trauma?:    Psychosis: N    Substance Use:    Alcohol: occasionally 2 glasses wine    Cannabis n     Tobacco:former  19 to 27y / not daily / 2-3 days / few cigaretes    Cocaine:n  Benzo:by Rx  Opioid: n  Ecstasy:n  Other:n    PAST PSYCHIATRIC HISTORY:     Inpatient: n  Outpatient:  2010 Dr ana de jesus      Allergy Review:   Review of patient's allergies indicates:  No Known Allergies     Medical Problem List:   Patient Active Problem List   Diagnosis    Uncontrolled hypertension    Depression with anxiety    Pain of right thumb    Decreased range of motion of right thumb        No past surgical history on file.     Other (medical) :    Head Injury: n  Seizure: n  Diabetes n  HTN YES  Other: headaches / tension stress    Family History:  Family History   Problem Relation Age of Onset    Lupus Mother     Hypertension Father     Cancer Maternal Aunt         colon, breast      Suicide none    Mental Status Exam:   Appearance: casual / shirt saying :"melatonin mafia "  Oriented: x 3 / including: Date: April 4 2023 ; and aware that at: [facility]  Attitude: cooperative tho tearful at times  Eye Contact: good   Behavior: calm here   Mood: says Ok   Cognition: alert / 20-3: 17, 14, does not do math well  Concentration: grossly intact   Affect: appropriate range   Anxiety: moderate to significant / jerry in discuss / murder of her brother marked  Thought Process: goal directed   Speech: Volume : WNL   Quantity varies genelaly shows range tho tearful " talking of brother   Quality: appears to openly answer questions   Eye Contact: good   Insight: acknowledges loss depression interested in treatemnt  Threats: no SI / no HI   Memory: intact (as relay information across time spans) Pres?  BIDEN        Prior Pres?  TRUMP  Psychosis: denies all   Estimate of Intellectual Function: average   Judgment (to simple situation): if saw a house on fire / A: call 911   Impulse Control: no history SI / nor HI ; calm here     3 wishes? : family to show their love /ontly thing mentioned / agreed to move on    PSYCHO-SOCIAL DEVELOPMENT HISTORY:   Social History     Socioeconomic History    Marital status: Single   Tobacco Use    Smoking status: Former    Smokeless tobacco: Never   Substance and Sexual Activity    Alcohol use: No    Drug use: Yes     Types: Marijuana    Sexual activity: Yes     Partners: Male     Birth control/protection: None     Social Determinants of Health     Financial Resource Strain: Low Risk     Difficulty of Paying Living Expenses: Not hard at all   Food Insecurity: No Food Insecurity    Worried About Running Out of Food in the Last Year: Never true    Ran Out of Food in the Last Year: Never true   Transportation Needs: No Transportation Needs    Lack of Transportation (Medical): No    Lack of Transportation (Non-Medical): No   Physical Activity: Sufficiently Active    Days of Exercise per Week: 7 days    Minutes of Exercise per Session: 60 min   Stress: Stress Concern Present    Feeling of Stress : Very much   Social Connections: Moderately Isolated    Frequency of Communication with Friends and Family: More than three times a week    Frequency of Social Gatherings with Friends and Family: More than three times a week    Attends Latter-day Services: 1 to 4 times per year    Active Member of Clubs or Organizations: No    Attends Club or Organization Meetings: Never    Marital Status: Never    Housing Stability: Low Risk     Unable to Pay for Housing  in the Last Year: No    Number of Places Lived in the Last Year: 1    Unstable Housing in the Last Year: No        City Born: [place]     Siblings (full or half)  Brothers: had one / he was killed  Sisters:one (older)    Parents : alive  /  36 yrs    Briefly Describe  your Mom: resilient strong person (has lupus)  Briefly Describe your Dad: dependable, trustworthy    Bio Mom: Occupation: homemaker  Bio Dad:  Occupation:  non denom / [employer] walls / scrap collecting     Marital Status: in relationship together 4 yrs / jan sep / back together (male)    Children   Girls  (ages) none  Boys (ages): 16y and 6 y    Education: 12th grade [employer]    Muslim / Spiritual: non denom Holiness    Legal Issues? n  DWI ? n  long term time? 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  No tho FMLA    Assessment:     Encounter Diagnoses   Name Primary?    Depression, major, recurrent, moderate Yes    Anxiety disorder, unspecified type     Grief         Current Outpatient Medications:     acetaminophen (TYLENOL) 500 MG tablet, Take 2 tablets (1,000 mg total) by mouth every 8 (eight) hours as needed for Pain., Disp: 60 tablet, Rfl: 0    ALPRAZolam (XANAX) 0.5 MG tablet, Take 0.5-1 tablets (0.25-0.5 mg total) by mouth 2 (two) times daily as needed (SIGNIFICANT ANXIETY)., Disp: 60 tablet, Rfl: 0    buPROPion (WELLBUTRIN XL) 150 MG TB24 tablet, Take 1 tablet (150 mg total) by mouth once daily., Disp: 90 tablet, Rfl: 0    cetirizine (ZYRTEC) 10 MG tablet, Take 1 tablet (10 mg total) by mouth every evening. for 10 days, Disp: 10 tablet, Rfl: 0    meloxicam (MOBIC) 15 MG tablet, Take 1 tablet (15 mg total) by mouth once daily. (Patient not taking: Reported on 3/31/2023), Disp: 30 tablet, Rfl: 0    meloxicam (MOBIC) 15 MG tablet, Take 1 tablet (15 mg total) by mouth once daily., Disp: 30 tablet, Rfl:  0    metoprolol succinate (TOPROL-XL) 25 MG 24 hr tablet, Take 1 tablet (25 mg total) by mouth once daily., Disp: 30 tablet, Rfl: 1    traMADoL (ULTRAM) 50 mg tablet, Take 1 tablet (50 mg total) by mouth every 12 (twelve) hours as needed for Pain., Disp: 20 tablet, Rfl: 0     Patient Instructions     PLAN:     FOLLOW UP      5/2/2023  3:30 PM ESTABLISHED PATIENT SHORT Ivan y - Psych [facility] 4th Fl Francisco Javier Elizabeth MD       Meds: D/C celexa as not like way felt / mutually agree to wellbutrin  mg once in morning / decreased xanax 0.5 mg from 3 time day to twice a day and as needed.     She was offered Intensive Outpatient Program (IOP) tho says not want to do as not like group    Referral placed to  social work for depression anxiety rief work    References:     Relaxation stress reduction workbook: ANA Mandujano PhD ( used: $7-10)    Feeling Good Website: Francisco Javier Juarez MD / www.[email or web address] website (free) / jerry. PODCASTS    Anxiety &  phobia workbook by ALEXEI Seth PhD  (web retailers: used: $ 7-10)    [place]: Path to Better Sleep : https://www.veterantraining.[place].gov[place]insomnia/ (free)       Pt expressed appreciation for the visit today and did not have further question at this time though pt  was still informed to:     Call  if problems.    Call / Report Side Effects to Psyc MD     Encouraged to follow up with primary care / Gen Med MD for continued monitoring of general health and wellness.  Y

## 2023-04-04 NOTE — TELEPHONE ENCOUNTER
"----- Message from Jamie L. Russo-Digeorge, PA-C sent at 4/4/2023  3:35 PM CDT -----  She can fill the Tramadol prescription, however, it is preferred that she not take the Xanax and Tramadol together. Her Xanax is written for "as needed" and so is the Tramadol. I would only take the Tramadol as needed at night for increased pain, not to be taken with the Xanax.  ----- Message -----  From: Silvina Mcknight MA  Sent: 4/4/2023   2:33 PM CDT  To: Jamie L. Russo-Digeorge, PA-C    Please advise!    Thanks    marty  ----- Message -----  From: Demarcus Wolff  Sent: 4/4/2023   2:28 PM CDT  To: Russo Digeorge Jamie Staff    Who Called:pt       What is the reqeust in detail: pt is requesting a call back to know if rx can be filled even tho she is taking xanax . Please advsie     traMADoL (ULTRAM) 50 mg tablet 20 tablet 0 3/31/2023  No  Sig - Route: Take 1 tablet (50 mg total) by mouth every 12 (twelve) hours as needed for Pain. - Oral  Sent to pharmacy as: traMADoL (ULTRAM) 50 mg tablet  Class: Normal    [facility] [address] [place] [address] (N), [place] - [address]    Can the clinic reply by [facility]? No       Best Call Back Number:[phone]      Additional Information:        "

## 2023-04-04 NOTE — PATIENT INSTRUCTIONS
PLAN:     FOLLOW UP      5/2/2023  3:30 PM ESTABLISHED PATIENT JAC Meehan - Psych [facility] 4th Fl Francisco Javier Elizabeth MD       Meds: D/C celexa as not like way felt / mutually agree to wellbutrin  mg once in morning / decreased xanax 0.5 mg from 3 time day to twice a day and as needed.     She was offered Intensive Outpatient Program (IOP) tho says not want to do as not like group    Referral placed to  social work for depression anxiety rief work    References:     Relaxation stress reduction workbook: ANA Mandujano PhD ( used: $7-10)    Feeling Good Website: Francisco Javier Juarez MD / www.[employer] website (free) / jerry. PODCASTS    Anxiety &  phobia workbook by ALEXEI Seth PhD  (web retailers: used: $ 7-10)    [place]: Path to Better Sleep : https://www.veterantraining.[place].gov/insomnia/ (free)       Pt expressed appreciation for the visit today and did not have further question at this time though pt  was still informed to:     Call  if problems.    Call / Report Side Effects to Psmalik ROSSI     Encouraged to follow up with primary care / Gen Med MD for continued monitoring of general health and wellness.  Y

## 2023-04-10 ENCOUNTER — PATIENT OUTREACH (OUTPATIENT)
Dept: ADMINISTRATIVE | Facility: OTHER | Age: 38
End: 2023-04-10
Payer: COMMERCIAL

## 2023-04-10 ENCOUNTER — OFFICE VISIT (OUTPATIENT)
Dept: PRIMARY CARE CLINIC | Facility: CLINIC | Age: 38
End: 2023-04-10
Payer: COMMERCIAL

## 2023-04-10 VITALS
WEIGHT: 204.25 LBS | HEIGHT: 62 IN | OXYGEN SATURATION: 99 % | DIASTOLIC BLOOD PRESSURE: 98 MMHG | SYSTOLIC BLOOD PRESSURE: 153 MMHG | HEART RATE: 60 BPM | BODY MASS INDEX: 37.59 KG/M2

## 2023-04-10 DIAGNOSIS — I10 UNCONTROLLED HYPERTENSION: Primary | ICD-10-CM

## 2023-04-10 DIAGNOSIS — F43.21 GRIEF: ICD-10-CM

## 2023-04-10 DIAGNOSIS — F41.8 DEPRESSION WITH ANXIETY: ICD-10-CM

## 2023-04-10 PROCEDURE — 3080F DIAST BP >= 90 MM HG: CPT | Mod: CPTII,S$GLB,, | Performed by: FAMILY MEDICINE

## 2023-04-10 PROCEDURE — 99999 PR PBB SHADOW E&M-EST. PATIENT-LVL III: CPT | Mod: PBBFAC,,, | Performed by: FAMILY MEDICINE

## 2023-04-10 PROCEDURE — 99999 PR PBB SHADOW E&M-EST. PATIENT-LVL III: ICD-10-PCS | Mod: PBBFAC,,, | Performed by: FAMILY MEDICINE

## 2023-04-10 PROCEDURE — 3080F PR MOST RECENT DIASTOLIC BLOOD PRESSURE >= 90 MM HG: ICD-10-PCS | Mod: CPTII,S$GLB,, | Performed by: FAMILY MEDICINE

## 2023-04-10 PROCEDURE — 1159F MED LIST DOCD IN RCRD: CPT | Mod: CPTII,S$GLB,, | Performed by: FAMILY MEDICINE

## 2023-04-10 PROCEDURE — 99214 OFFICE O/P EST MOD 30 MIN: CPT | Mod: S$GLB,,, | Performed by: FAMILY MEDICINE

## 2023-04-10 PROCEDURE — 3008F PR BODY MASS INDEX (BMI) DOCUMENTED: ICD-10-PCS | Mod: CPTII,S$GLB,, | Performed by: FAMILY MEDICINE

## 2023-04-10 PROCEDURE — 1160F PR REVIEW ALL MEDS BY PRESCRIBER/CLIN PHARMACIST DOCUMENTED: ICD-10-PCS | Mod: CPTII,S$GLB,, | Performed by: FAMILY MEDICINE

## 2023-04-10 PROCEDURE — 3077F SYST BP >= 140 MM HG: CPT | Mod: CPTII,S$GLB,, | Performed by: FAMILY MEDICINE

## 2023-04-10 PROCEDURE — 3077F PR MOST RECENT SYSTOLIC BLOOD PRESSURE >= 140 MM HG: ICD-10-PCS | Mod: CPTII,S$GLB,, | Performed by: FAMILY MEDICINE

## 2023-04-10 PROCEDURE — 1160F RVW MEDS BY RX/DR IN RCRD: CPT | Mod: CPTII,S$GLB,, | Performed by: FAMILY MEDICINE

## 2023-04-10 PROCEDURE — 3008F BODY MASS INDEX DOCD: CPT | Mod: CPTII,S$GLB,, | Performed by: FAMILY MEDICINE

## 2023-04-10 PROCEDURE — 1159F PR MEDICATION LIST DOCUMENTED IN MEDICAL RECORD: ICD-10-PCS | Mod: CPTII,S$GLB,, | Performed by: FAMILY MEDICINE

## 2023-04-10 PROCEDURE — 99214 PR OFFICE/OUTPT VISIT, EST, LEVL IV, 30-39 MIN: ICD-10-PCS | Mod: S$GLB,,, | Performed by: FAMILY MEDICINE

## 2023-04-10 RX ORDER — METOPROLOL SUCCINATE 50 MG/1
50 TABLET, EXTENDED RELEASE ORAL DAILY
Qty: 90 TABLET | Refills: 0 | Status: SHIPPED | OUTPATIENT
Start: 2023-04-10 | End: 2023-08-19

## 2023-04-10 NOTE — PROGRESS NOTES
Subjective     Patient ID: Gina Dickson is a 37 y.o. female.    Chief Complaint: No chief complaint on file.    HPI:  Patient is a 37-year-old female here for follow-up of uncontrolled hypertension, depression, anxiety and grief reaction.  Patient continues to report elevated blood pressure.  She was seen by Psychiatry and started on Wellbutrin XL for depression.  She takes Xanax has been helping her with anxiety and sleep.  She knees completion of forms for short-term disability.   Review of Systems       Objective     Physical Exam  Constitutional:       Appearance: She is obese.   Neurological:      Mental Status: She is alert.   Psychiatric:         Mood and Affect: Mood is anxious and depressed. Affect is not tearful.         Speech: Speech normal.         Thought Content: Thought content does not include suicidal plan.         Cognition and Memory: Cognition normal.          Assessment and Plan     Problem List Items Addressed This Visit          Psychiatric    Depression with anxiety       Cardiac/Vascular    Uncontrolled hypertension - Primary    Relevant Medications    metoprolol succinate (TOPROL-XL) 50 MG 24 hr tablet     Other Visit Diagnoses       Grief              Diagnoses and all orders for this visit:    Uncontrolled hypertension  Blood pressure not at goal.  Will increase metoprolol succinate to 50 mg daily.  Depression and anxiety may be contributing to uncontrolled blood pressure.  -     metoprolol succinate (TOPROL-XL) 50 MG 24 hr tablet; Take 1 tablet (50 mg total) by mouth once daily.    Depression with anxiety  Forms completed for short-term disability.  Patient advised to continue Wellbutrin XL prescribed per Psychiatry.  Will referred to behavior health for counseling..  Patient has a follow-up with Psychiatry the mood.    Grief  Patient is experiencing the unexpected sudden death of several family members over the past month.

## 2023-04-10 NOTE — PROGRESS NOTES
CHW - Initial Contact    This Community Health Worker updated the Social Determinant of Health questionnaire with patient during clinic visit today.    Pt identified barriers of most importance are: patient statsed that she needs help with finding behavioral health program   Referrals to community agencies completed with patient/caregiver consent outside of [facility] include: no  Referrals were put through [facility] - yes: chw put in some referrals in through to transitional hosing, our community health, behavioral health services, [facility], interisive outpatient program  Support and Services: yes chw is looking up more information for patient  Other information discussed the patient needs / wants help with: no   Follow up required:   Initial Outreach - Due: 4/13/2023

## 2023-04-11 ENCOUNTER — TELEPHONE (OUTPATIENT)
Dept: ORTHOPEDICS | Facility: CLINIC | Age: 38
End: 2023-04-11
Payer: COMMERCIAL

## 2023-04-12 ENCOUNTER — PATIENT MESSAGE (OUTPATIENT)
Dept: ADMINISTRATIVE | Facility: OTHER | Age: 38
End: 2023-04-12
Payer: COMMERCIAL

## 2023-04-14 ENCOUNTER — HOSPITAL ENCOUNTER (OUTPATIENT)
Dept: RADIOLOGY | Facility: HOSPITAL | Age: 38
Discharge: HOME | End: 2023-04-14
Attending: PHYSICIAN ASSISTANT
Payer: COMMERCIAL

## 2023-04-14 ENCOUNTER — PATIENT MESSAGE (OUTPATIENT)
Dept: ADMINISTRATIVE | Facility: OTHER | Age: 38
End: 2023-04-14
Payer: COMMERCIAL

## 2023-04-14 DIAGNOSIS — S63.641A RUPTURE OF ULNAR COLLATERAL LIGAMENT OF RIGHT THUMB, INITIAL ENCOUNTER: ICD-10-CM

## 2023-04-14 PROCEDURE — 73218 MRI UPPER EXTREMITY W/O DYE: CPT | Mod: 26,RT,, | Performed by: RADIOLOGY

## 2023-04-14 PROCEDURE — 73218 MRI THUMB WITHOUT CONTRAST RIGHT: ICD-10-PCS | Mod: 26,RT,, | Performed by: RADIOLOGY

## 2023-04-14 PROCEDURE — 73218 MRI UPPER EXTREMITY W/O DYE: CPT | Mod: TC,RT

## 2023-04-17 ENCOUNTER — PATIENT OUTREACH (OUTPATIENT)
Dept: ADMINISTRATIVE | Facility: HOSPITAL | Age: 38
End: 2023-04-17
Payer: COMMERCIAL

## 2023-04-17 ENCOUNTER — TELEPHONE (OUTPATIENT)
Dept: ADMINISTRATIVE | Facility: HOSPITAL | Age: 38
End: 2023-04-17

## 2023-04-20 ENCOUNTER — PATIENT MESSAGE (OUTPATIENT)
Dept: ORTHOPEDICS | Facility: CLINIC | Age: 38
End: 2023-04-20
Payer: COMMERCIAL

## 2023-04-20 RX ORDER — HYDROCODONE BITARTRATE AND ACETAMINOPHEN 5; 325 MG/1; MG/1
1 TABLET ORAL EVERY 12 HOURS PRN
Qty: 10 TABLET | Refills: 0 | Status: SHIPPED | OUTPATIENT
Start: 2023-04-20 | End: 2023-04-27 | Stop reason: HOSPADM

## 2023-04-25 ENCOUNTER — OFFICE VISIT (OUTPATIENT)
Dept: ORTHOPEDICS | Facility: CLINIC | Age: 38
End: 2023-04-25
Payer: COMMERCIAL

## 2023-04-25 VITALS — HEIGHT: 62 IN | BODY MASS INDEX: 37.54 KG/M2 | WEIGHT: 204 LBS

## 2023-04-25 DIAGNOSIS — S63.641A RUPTURE OF ULNAR COLLATERAL LIGAMENT OF RIGHT THUMB, INITIAL ENCOUNTER: Primary | ICD-10-CM

## 2023-04-25 DIAGNOSIS — S63.641A RUPTURE OF ULNAR COLLATERAL LIGAMENT OF RIGHT THUMB: ICD-10-CM

## 2023-04-25 PROCEDURE — 99214 PR OFFICE/OUTPT VISIT, EST, LEVL IV, 30-39 MIN: ICD-10-PCS | Mod: S$GLB,,, | Performed by: ORTHOPAEDIC SURGERY

## 2023-04-25 PROCEDURE — 1159F MED LIST DOCD IN RCRD: CPT | Mod: CPTII,S$GLB,, | Performed by: ORTHOPAEDIC SURGERY

## 2023-04-25 PROCEDURE — 99999 PR PBB SHADOW E&M-EST. PATIENT-LVL III: ICD-10-PCS | Mod: PBBFAC,,, | Performed by: ORTHOPAEDIC SURGERY

## 2023-04-25 PROCEDURE — 99999 PR PBB SHADOW E&M-EST. PATIENT-LVL III: CPT | Mod: PBBFAC,,, | Performed by: ORTHOPAEDIC SURGERY

## 2023-04-25 PROCEDURE — 99214 OFFICE O/P EST MOD 30 MIN: CPT | Mod: S$GLB,,, | Performed by: ORTHOPAEDIC SURGERY

## 2023-04-25 PROCEDURE — 3008F BODY MASS INDEX DOCD: CPT | Mod: CPTII,S$GLB,, | Performed by: ORTHOPAEDIC SURGERY

## 2023-04-25 PROCEDURE — 1159F PR MEDICATION LIST DOCUMENTED IN MEDICAL RECORD: ICD-10-PCS | Mod: CPTII,S$GLB,, | Performed by: ORTHOPAEDIC SURGERY

## 2023-04-25 PROCEDURE — 3008F PR BODY MASS INDEX (BMI) DOCUMENTED: ICD-10-PCS | Mod: CPTII,S$GLB,, | Performed by: ORTHOPAEDIC SURGERY

## 2023-04-25 RX ORDER — MUPIROCIN 20 MG/G
OINTMENT TOPICAL
Status: CANCELLED | OUTPATIENT
Start: 2023-04-25

## 2023-04-25 RX ORDER — CEFAZOLIN SODIUM 2 G/50ML
2 SOLUTION INTRAVENOUS
Status: CANCELLED | OUTPATIENT
Start: 2023-04-25

## 2023-04-25 NOTE — PROGRESS NOTES
[facility]  History & Physical  Orthopedics    SUBJECTIVE:      Chief Complaint: Right thumb    Referring Provider: No ref. provider found     Dr. Elias is the supervising physician for this encounter/patient    History of Present Illness:  Patient is a 37 y.o. right hand dominant female who presents today with complaints of right thumb injury occurred on 3/4/23, while on vacation in [place], she was gripping a handrail and slipped/fell, this caused a hyper abduction injury to the thumb. She has noted pain, swelling and numbness/tingling off on in the thumb since the injury. Pain and instability when trying to  something. No surgical history on the hands.     Interval History 3/31/23: the patient returns for continued treatment of her right thumb injury, concern for ucl injury, just 4 weeks from injury. She has started OT, has completed 3 sessions thus far. She reports that her forearm pain is improving, but the thumb pain has remained the same. Difficulty with motion due to pain. She is wearing her orthosis, but has trouble getting through OT due to pain.    Interval history April 25, 2023: The patient returns today re-evaluation of right thumb.  She an injury about 6 weeks ago while vacationing in [place].  She had significant and severe pain at aspect of the right thumb MCP joint difficulty with pinching activities secondary feelings of instability since that time.  She had a recent MRI returns these results and re-evaluation.  No other complaints today.    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    Onset of symptoms/DOI was 3/4/23.    Symptoms are aggravated by activity and movement.    Symptoms are alleviated by rest.    Symptoms consist of pain, swelling, decreased ROM, and numbness/tingling.    The patient rates their pain as a 7/10    Attempted treatment(s) and/or interventions include activity modifications, rest, anti-inflammatory medications, orthosis and OT.     The patient denies  any fevers, chills, N/V, D/C and presents for evaluation.       Past Medical History:   Diagnosis Date    Fibroids     Hypertension      No past surgical history on file.  Review of patient's allergies indicates:  No Known Allergies  Social History     Social History Narrative    Not on file     Family History   Problem Relation Age of Onset    Lupus Mother     Hypertension Father     Cancer Maternal Aunt         colon, breast         Current Outpatient Medications:     acetaminophen (TYLENOL) 500 MG tablet, Take 2 tablets (1,000 mg total) by mouth every 8 (eight) hours as needed for Pain., Disp: 60 tablet, Rfl: 0    ALPRAZolam (XANAX) 0.5 MG tablet, Take 0.5-1 tablets (0.25-0.5 mg total) by mouth 2 (two) times daily as needed (SIGNIFICANT ANXIETY)., Disp: 60 tablet, Rfl: 0    buPROPion (WELLBUTRIN XL) 150 MG TB24 tablet, Take 1 tablet (150 mg total) by mouth once daily., Disp: 90 tablet, Rfl: 0    cetirizine (ZYRTEC) 10 MG tablet, Take 1 tablet (10 mg total) by mouth every evening. for 10 days, Disp: 10 tablet, Rfl: 0    HYDROcodone-acetaminophen (NORCO) 5-325 mg per tablet, Take 1 tablet by mouth every 12 (twelve) hours as needed for Pain., Disp: 10 tablet, Rfl: 0    meloxicam (MOBIC) 15 MG tablet, Take 1 tablet (15 mg total) by mouth once daily. (Patient not taking: Reported on 3/31/2023), Disp: 30 tablet, Rfl: 0    meloxicam (MOBIC) 15 MG tablet, Take 1 tablet (15 mg total) by mouth once daily., Disp: 30 tablet, Rfl: 0    metoprolol succinate (TOPROL-XL) 50 MG 24 hr tablet, Take 1 tablet (50 mg total) by mouth once daily., Disp: 90 tablet, Rfl: 0    traMADoL (ULTRAM) 50 mg tablet, Take 1 tablet (50 mg total) by mouth every 12 (twelve) hours as needed for Pain., Disp: 20 tablet, Rfl: 0      Review of Systems:  Constitutional: no fever or chills  Eyes: no visual changes  ENT: no nasal congestion or sore throat  Respiratory: no cough or shortness of breath  Cardiovascular: no chest pain  Gastrointestinal: no nausea  "or vomiting, tolerating diet  Musculoskeletal: pain, soreness, numbness/tingling, and decreased ROM    OBJECTIVE:      Vital Signs (Most Recent):  Vitals:    04/25/23 1150   Weight: 92.5 kg (204 lb)   Height: 5' 2" (1.575 m)     Body mass index is 37.31 kg/m².      Physical Exam:  Constitutional: The patient appears well-developed and well-nourished. No distress.   Skin: No lesions appreciated  Head: Normocephalic and atraumatic.   Nose: Nose normal.   Ears: No deformities seen  Eyes: Conjunctivae and EOM are normal.   Neck: No tracheal deviation present.   Cardiovascular: Normal rate and intact distal pulses.    Pulmonary/Chest: Effort normal. No respiratory distress.   Abdominal: There is no guarding.   Neurological: The patient is alert.   Psychiatric: The patient has a normal mood and affect.     Right Hand/Wrist Examination:    Observation/Inspection:  Swelling  Mild to the thumb    Deformity  none  Discoloration  none     Scars   none    Atrophy  none    HAND/WRIST EXAMINATION:  Finkelstein's Test   Neg  WHAT Test    Neg  Snuff box tenderness   Neg  Man's Test    Neg  Hook of Hamate Tenderness  Neg  CMC grind    Neg  Circumduction test   Neg  Acutely TTP to the ulnar boarder of the thumb MCP joint, NTTP to the thumb A1 pulley.  Examination of the right thumb demonstrates pathologic laxity the ulnar collateral ligament of MCP joint in both full extension degrees of flexion; this is asymmetric to the contralateral uninjured thumb    Neurovascular Exam:  Digits WWP, brisk CR < 3s throughout  NVI motor/LTS to M/R/U nerves, radial pulse 2+  Tinel's Test - Carpal Tunnel  Neg  Tinel's Test - Cubital Tunnel  Neg  Phalen's Test    Neg  Median Nerve Compression Test Neg    ROM hand: decreased thumb motion due to pain, however, MCP and IP flexion/extension intact.    ROM wrist full, painless    ROM elbow full, painless    Abdomen not guarded  Respirations nonlabored  Perfusion intact    Diagnostic Results:     Imaging " - I independently viewed the patient's imaging as well as the radiology report.      FINDINGS:  No acute fractures.  Preserved bone density.  Preserved joint spaces.  No opaque soft tissue foreign bodies.  Soft tissue swelling dorsally at the level of the metacarpals and MCP articulations.     MRI right thumb-   Impression:     Complete tear of ulnar collateral ligament at the distal attachment with retraction and suspected Stener lesion.       Impression:     As above    EMG - none    ASSESSMENT/PLAN:      37 y.o. yo female with Right thumb UCL tear with Stener lesion    Plan: The patient and I had a thorough discussion today.  We discussed the working diagnosis as well as several other potential alternative diagnoses.  Treatment options were discussed, both conservative and surgical.  Conservative treatment options would include things such as activity modifications, workplace modifications, a period of rest, oral vs topical OTC and prescription anti-inflammatory medications, occupational therapy, splinting/bracing, immobilization, corticosteroid injections, and others.  Surgical options were discussed as well.     At this time, the patient like to proceed with right thumb UCL repair with internal brace on April 28, 2023 which I feel is reasonable.  We will get this scheduled.      The patient has not responded to adequate non operative treatment at this time and/or non operative treatment is not indicated. Thus, the risks, benefits and alternatives to surgery were discussed with the patient in detail.  Specific risks include but are not limited to bleeding, infection, vessel and/or nerve damage, pain, numbness, tingling, compartment syndrome, need for additional surgery, failure to return to pre-injury and/or preoperative functional status, inability to return to work, scar sensitivity, delayed healing, complex regional pain syndrome, weakness, pulley injury, tendon injury, bowstringing, partial and/or incomplete  relief of symptoms, persistence of and/or worsening of symptoms, hardware and/or surgical failure, prominent and/or symptomatic hardware possibly necessitating future removal, osteomyelitis, amputation, loss of function, stiffness, rotational malalignment, functional debility, dysfunction, decreased  strength, need for prolonged postoperative rehabilitation, malunion, nonunion, deep venous thrombosis, pulmonary embolism, arthritis and death.  The patient states an understanding and wishes to proceed with surgery.   All questions were answered.  No guarantees were implied or stated.  Written informed consent was obtained.

## 2023-04-25 NOTE — H&P (VIEW-ONLY)
[facility]  History & Physical  Orthopedics     SUBJECTIVE:       Chief Complaint: Right thumb     Referring Provider: No ref. provider found      Dr. Elias is the supervising physician for this encounter/patient     History of Present Illness:  Patient is a 37 y.o. right hand dominant female who presents today with complaints of right thumb injury occurred on 3/4/23, while on vacation in [place], she was gripping a handrail and slipped/fell, this caused a hyper abduction injury to the thumb. She has noted pain, swelling and numbness/tingling off on in the thumb since the injury. Pain and instability when trying to  something. No surgical history on the hands.      Interval History 3/31/23: the patient returns for continued treatment of her right thumb injury, concern for ucl injury, just 4 weeks from injury. She has started OT, has completed 3 sessions thus far. She reports that her forearm pain is improving, but the thumb pain has remained the same. Difficulty with motion due to pain. She is wearing her orthosis, but has trouble getting through OT due to pain.     Interval history April 25, 2023: The patient returns today re-evaluation of right thumb.  She an injury about 6 weeks ago while vacationing in [place].  She had significant and severe pain at aspect of the right thumb MCP joint difficulty with pinching activities secondary feelings of instability since that time.  She had a recent MRI returns these results and re-evaluation.  No other complaints today.     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     Onset of symptoms/DOI was 3/4/23.     Symptoms are aggravated by activity and movement.     Symptoms are alleviated by rest.     Symptoms consist of pain, swelling, decreased ROM, and numbness/tingling.     The patient rates their pain as a 7/10     Attempted treatment(s) and/or interventions include activity modifications, rest, anti-inflammatory medications, orthosis and OT.     The  patient denies any fevers, chills, N/V, D/C and presents for evaluation.             Past Medical History:   Diagnosis Date    Fibroids      Hypertension        No past surgical history on file.  Review of patient's allergies indicates:  No Known Allergies  Social History          Social History Narrative    Not on file            Family History   Problem Relation Age of Onset    Lupus Mother      Hypertension Father      Cancer Maternal Aunt           colon, breast            Current Outpatient Medications:     acetaminophen (TYLENOL) 500 MG tablet, Take 2 tablets (1,000 mg total) by mouth every 8 (eight) hours as needed for Pain., Disp: 60 tablet, Rfl: 0    ALPRAZolam (XANAX) 0.5 MG tablet, Take 0.5-1 tablets (0.25-0.5 mg total) by mouth 2 (two) times daily as needed (SIGNIFICANT ANXIETY)., Disp: 60 tablet, Rfl: 0    buPROPion (WELLBUTRIN XL) 150 MG TB24 tablet, Take 1 tablet (150 mg total) by mouth once daily., Disp: 90 tablet, Rfl: 0    cetirizine (ZYRTEC) 10 MG tablet, Take 1 tablet (10 mg total) by mouth every evening. for 10 days, Disp: 10 tablet, Rfl: 0    HYDROcodone-acetaminophen (NORCO) 5-325 mg per tablet, Take 1 tablet by mouth every 12 (twelve) hours as needed for Pain., Disp: 10 tablet, Rfl: 0    meloxicam (MOBIC) 15 MG tablet, Take 1 tablet (15 mg total) by mouth once daily. (Patient not taking: Reported on 3/31/2023), Disp: 30 tablet, Rfl: 0    meloxicam (MOBIC) 15 MG tablet, Take 1 tablet (15 mg total) by mouth once daily., Disp: 30 tablet, Rfl: 0    metoprolol succinate (TOPROL-XL) 50 MG 24 hr tablet, Take 1 tablet (50 mg total) by mouth once daily., Disp: 90 tablet, Rfl: 0    traMADoL (ULTRAM) 50 mg tablet, Take 1 tablet (50 mg total) by mouth every 12 (twelve) hours as needed for Pain., Disp: 20 tablet, Rfl: 0        Review of Systems:  Constitutional: no fever or chills  Eyes: no visual changes  ENT: no nasal congestion or sore throat  Respiratory: no cough or shortness of  "breath  Cardiovascular: no chest pain  Gastrointestinal: no nausea or vomiting, tolerating diet  Musculoskeletal: pain, soreness, numbness/tingling, and decreased ROM     OBJECTIVE:       Vital Signs (Most Recent):  Vitals       Vitals:     04/25/23 1150   Weight: 92.5 kg (204 lb)   Height: 5' 2" (1.575 m)         Body mass index is 37.31 kg/m².        Physical Exam:  Constitutional: The patient appears well-developed and well-nourished. No distress.   Skin: No lesions appreciated  Head: Normocephalic and atraumatic.   Nose: Nose normal.   Ears: No deformities seen  Eyes: Conjunctivae and EOM are normal.   Neck: No tracheal deviation present.   Cardiovascular: Normal rate and intact distal pulses.    Pulmonary/Chest: Effort normal. No respiratory distress.   Abdominal: There is no guarding.   Neurological: The patient is alert.   Psychiatric: The patient has a normal mood and affect.      Right Hand/Wrist Examination:     Observation/Inspection:  Swelling                       Mild to the thumb                      Deformity                     none  Discoloration               none                  Scars                           none                  Atrophy                        none     HAND/WRIST EXAMINATION:  Finkelstein's Test                                Neg  WHAT Test                                         Neg  Snuff box tenderness                          Neg  Man's Test                                     Neg  Hook of Hamate Tenderness              Neg  CMC grind                                           Neg  Circumduction test                              Neg  Acutely TTP to the ulnar boarder of the thumb MCP joint, NTTP to the thumb A1 pulley.  Examination of the right thumb demonstrates pathologic laxity the ulnar collateral ligament of MCP joint in both full extension degrees of flexion; this is asymmetric to the contralateral uninjured thumb     Neurovascular Exam:  Digits WWP, brisk CR < 3s " throughout  NVI motor/LTS to M/R/U nerves, radial pulse 2+  Tinel's Test - Carpal Tunnel                Neg  Tinel's Test - Cubital Tunnel               Neg  Phalen's Test                                      Neg  Median Nerve Compression Test       Neg     ROM hand: decreased thumb motion due to pain, however, MCP and IP flexion/extension intact.     ROM wrist full, painless    ROM elbow full, painless     Abdomen not guarded  Respirations nonlabored  Perfusion intact     Diagnostic Results:     Imaging - I independently viewed the patient's imaging as well as the radiology report.       FINDINGS:  No acute fractures.  Preserved bone density.  Preserved joint spaces.  No opaque soft tissue foreign bodies.  Soft tissue swelling dorsally at the level of the metacarpals and MCP articulations.     MRI right thumb-   Impression:     Complete tear of ulnar collateral ligament at the distal attachment with retraction and suspected Stener lesion.        Impression:     As above     EMG - none     ASSESSMENT/PLAN:       37 y.o. yo female with Right thumb UCL tear with Stener lesion     Plan: The patient and I had a thorough discussion today.  We discussed the working diagnosis as well as several other potential alternative diagnoses.  Treatment options were discussed, both conservative and surgical.  Conservative treatment options would include things such as activity modifications, workplace modifications, a period of rest, oral vs topical OTC and prescription anti-inflammatory medications, occupational therapy, splinting/bracing, immobilization, corticosteroid injections, and others.  Surgical options were discussed as well.      At this time, the patient like to proceed with right thumb UCL repair with internal brace on April 28, 2023 which I feel is reasonable.  We will get this scheduled.      The patient has not responded to adequate non operative treatment at this time and/or non operative treatment is not indicated.  Thus, the risks, benefits and alternatives to surgery were discussed with the patient in detail.  Specific risks include but are not limited to bleeding, infection, vessel and/or nerve damage, pain, numbness, tingling, compartment syndrome, need for additional surgery, failure to return to pre-injury and/or preoperative functional status, inability to return to work, scar sensitivity, delayed healing, complex regional pain syndrome, weakness, pulley injury, tendon injury, bowstringing, partial and/or incomplete relief of symptoms, persistence of and/or worsening of symptoms, hardware and/or surgical failure, prominent and/or symptomatic hardware possibly necessitating future removal, osteomyelitis, amputation, loss of function, stiffness, rotational malalignment, functional debility, dysfunction, decreased  strength, need for prolonged postoperative rehabilitation, malunion, nonunion, deep venous thrombosis, pulmonary embolism, arthritis and death.  The patient states an understanding and wishes to proceed with surgery.   All questions were answered.  No guarantees were implied or stated.  Written informed consent was obtained.

## 2023-04-27 ENCOUNTER — TELEPHONE (OUTPATIENT)
Dept: ORTHOPEDICS | Facility: CLINIC | Age: 38
End: 2023-04-27
Payer: COMMERCIAL

## 2023-04-27 ENCOUNTER — ANESTHESIA EVENT (OUTPATIENT)
Dept: SURGERY | Facility: HOSPITAL | Age: 38
End: 2023-04-27
Payer: COMMERCIAL

## 2023-04-27 RX ORDER — IBUPROFEN 600 MG/1
600 TABLET ORAL 3 TIMES DAILY
Qty: 90 TABLET | Refills: 0 | Status: SHIPPED | OUTPATIENT
Start: 2023-04-27

## 2023-04-27 RX ORDER — DEXTROMETHORPHAN HYDROBROMIDE, GUAIFENESIN 5; 100 MG/5ML; MG/5ML
650 LIQUID ORAL EVERY 8 HOURS
Qty: 90 TABLET | Refills: 0 | Status: SHIPPED | OUTPATIENT
Start: 2023-04-27 | End: 2023-08-17

## 2023-04-27 RX ORDER — TRAMADOL HYDROCHLORIDE 50 MG/1
50 TABLET ORAL EVERY 6 HOURS
Qty: 20 TABLET | Refills: 0 | Status: SHIPPED | OUTPATIENT
Start: 2023-04-27 | End: 2023-05-05

## 2023-04-27 NOTE — TELEPHONE ENCOUNTER
M for the patient. Notified of 8 AM arrival time to the [facility], [facility]. Requested confirmation.     Angely Pickard MS, OTC  Clinical Assistant to Dr. Ross Dunbar [facility]

## 2023-04-28 ENCOUNTER — ANESTHESIA (OUTPATIENT)
Dept: SURGERY | Facility: HOSPITAL | Age: 38
End: 2023-04-28
Payer: COMMERCIAL

## 2023-04-28 ENCOUNTER — HOSPITAL ENCOUNTER (OUTPATIENT)
Facility: HOSPITAL | Age: 38
Discharge: HOME | End: 2023-04-28
Attending: ORTHOPAEDIC SURGERY | Admitting: ORTHOPAEDIC SURGERY
Payer: COMMERCIAL

## 2023-04-28 VITALS
RESPIRATION RATE: 15 BRPM | BODY MASS INDEX: 37.54 KG/M2 | WEIGHT: 204 LBS | DIASTOLIC BLOOD PRESSURE: 87 MMHG | OXYGEN SATURATION: 99 % | HEIGHT: 62 IN | HEART RATE: 61 BPM | SYSTOLIC BLOOD PRESSURE: 175 MMHG | TEMPERATURE: 98 F

## 2023-04-28 DIAGNOSIS — S63.641A RUPTURE OF ULNAR COLLATERAL LIGAMENT OF RIGHT THUMB: ICD-10-CM

## 2023-04-28 DIAGNOSIS — S63.641A RUPTURE OF ULNAR COLLATERAL LIGAMENT OF RIGHT THUMB, INITIAL ENCOUNTER: ICD-10-CM

## 2023-04-28 LAB
B-HCG UR QL: NEGATIVE
CTP QC/QA: YES

## 2023-04-28 PROCEDURE — 37000009 HC ANESTHESIA EA ADD 15 MINS: Performed by: ORTHOPAEDIC SURGERY

## 2023-04-28 PROCEDURE — D9220A PRA ANESTHESIA: ICD-10-PCS | Mod: CRNA,,, | Performed by: NURSE ANESTHETIST, CERTIFIED REGISTERED

## 2023-04-28 PROCEDURE — D9220A PRA ANESTHESIA: Mod: CRNA,,, | Performed by: NURSE ANESTHETIST, CERTIFIED REGISTERED

## 2023-04-28 PROCEDURE — 36000708 HC OR TIME LEV III 1ST 15 MIN: Performed by: ORTHOPAEDIC SURGERY

## 2023-04-28 PROCEDURE — 25000003 PHARM REV CODE 250: Performed by: ORTHOPAEDIC SURGERY

## 2023-04-28 PROCEDURE — 63600175 PHARM REV CODE 636 W HCPCS: Performed by: ORTHOPAEDIC SURGERY

## 2023-04-28 PROCEDURE — 37000008 HC ANESTHESIA 1ST 15 MINUTES: Performed by: ORTHOPAEDIC SURGERY

## 2023-04-28 PROCEDURE — 71000033 HC RECOVERY, INTIAL HOUR: Performed by: ORTHOPAEDIC SURGERY

## 2023-04-28 PROCEDURE — D9220A PRA ANESTHESIA: ICD-10-PCS | Mod: ANES,,, | Performed by: ANESTHESIOLOGY

## 2023-04-28 PROCEDURE — 64415 SUPRACLAVICULAR SINGLE SHOT: ICD-10-PCS | Mod: XU,RT,, | Performed by: ANESTHESIOLOGY

## 2023-04-28 PROCEDURE — 25000003 PHARM REV CODE 250: Performed by: STUDENT IN AN ORGANIZED HEALTH CARE EDUCATION/TRAINING PROGRAM

## 2023-04-28 PROCEDURE — D9220A PRA ANESTHESIA: Mod: ANES,,, | Performed by: ANESTHESIOLOGY

## 2023-04-28 PROCEDURE — 26540 REPAIR HAND JOINT: CPT | Mod: F5,,, | Performed by: ORTHOPAEDIC SURGERY

## 2023-04-28 PROCEDURE — 63600175 PHARM REV CODE 636 W HCPCS: Performed by: NURSE ANESTHETIST, CERTIFIED REGISTERED

## 2023-04-28 PROCEDURE — 63600175 PHARM REV CODE 636 W HCPCS: Performed by: STUDENT IN AN ORGANIZED HEALTH CARE EDUCATION/TRAINING PROGRAM

## 2023-04-28 PROCEDURE — C1713 ANCHOR/SCREW BN/BN,TIS/BN: HCPCS | Performed by: ORTHOPAEDIC SURGERY

## 2023-04-28 PROCEDURE — 27200750 HC INSULATED NEEDLE/ STIMUPLEX: Performed by: ANESTHESIOLOGY

## 2023-04-28 PROCEDURE — 94761 N-INVAS EAR/PLS OXIMETRY MLT: CPT

## 2023-04-28 PROCEDURE — 26540 PR FIX COLLAT LIG,MC-P JT,I-P JT: ICD-10-PCS | Mod: F5,,, | Performed by: ORTHOPAEDIC SURGERY

## 2023-04-28 PROCEDURE — 25000003 PHARM REV CODE 250: Performed by: NURSE ANESTHETIST, CERTIFIED REGISTERED

## 2023-04-28 PROCEDURE — C1889 IMPLANT/INSERT DEVICE, NOC: HCPCS | Performed by: ORTHOPAEDIC SURGERY

## 2023-04-28 PROCEDURE — 71000015 HC POSTOP RECOV 1ST HR: Performed by: ORTHOPAEDIC SURGERY

## 2023-04-28 PROCEDURE — 64415 NJX AA&/STRD BRCH PLXS IMG: CPT | Performed by: ANESTHESIOLOGY

## 2023-04-28 PROCEDURE — 36000709 HC OR TIME LEV III EA ADD 15 MIN: Performed by: ORTHOPAEDIC SURGERY

## 2023-04-28 PROCEDURE — 25000003 PHARM REV CODE 250: Performed by: ANESTHESIOLOGY

## 2023-04-28 PROCEDURE — 99900035 HC TECH TIME PER 15 MIN (STAT)

## 2023-04-28 PROCEDURE — 27201423 OPTIME MED/SURG SUP & DEVICES STERILE SUPPLY: Performed by: ORTHOPAEDIC SURGERY

## 2023-04-28 DEVICE — KIT INTERNALBRACE HAND/WRIST: Type: IMPLANTABLE DEVICE | Site: FINGER | Status: FUNCTIONAL

## 2023-04-28 RX ORDER — FENTANYL CITRATE 50 UG/ML
25-200 INJECTION, SOLUTION INTRAMUSCULAR; INTRAVENOUS
Status: DISCONTINUED | OUTPATIENT
Start: 2023-04-28 | End: 2023-04-28 | Stop reason: HOSPADM

## 2023-04-28 RX ORDER — ACETAMINOPHEN 500 MG
1000 TABLET ORAL
Status: COMPLETED | OUTPATIENT
Start: 2023-04-28 | End: 2023-04-28

## 2023-04-28 RX ORDER — DEXAMETHASONE SODIUM PHOSPHATE 4 MG/ML
INJECTION, SOLUTION INTRA-ARTICULAR; INTRALESIONAL; INTRAMUSCULAR; INTRAVENOUS; SOFT TISSUE
Status: DISCONTINUED | OUTPATIENT
Start: 2023-04-28 | End: 2023-04-28

## 2023-04-28 RX ORDER — HYDROCODONE BITARTRATE AND ACETAMINOPHEN 7.5; 325 MG/1; MG/1
1 TABLET ORAL EVERY 6 HOURS PRN
Qty: 28 TABLET | Refills: 0 | Status: SHIPPED | OUTPATIENT
Start: 2023-04-28 | End: 2023-05-01

## 2023-04-28 RX ORDER — ONDANSETRON 2 MG/ML
INJECTION INTRAMUSCULAR; INTRAVENOUS
Status: DISCONTINUED | OUTPATIENT
Start: 2023-04-28 | End: 2023-04-28

## 2023-04-28 RX ORDER — LIDOCAINE HYDROCHLORIDE 20 MG/ML
INJECTION, SOLUTION EPIDURAL; INFILTRATION; INTRACAUDAL; PERINEURAL
Status: COMPLETED | OUTPATIENT
Start: 2023-04-28 | End: 2023-04-28

## 2023-04-28 RX ORDER — ONDANSETRON 4 MG/1
8 TABLET, ORALLY DISINTEGRATING ORAL EVERY 8 HOURS PRN
Status: DISCONTINUED | OUTPATIENT
Start: 2023-04-28 | End: 2023-04-28 | Stop reason: HOSPADM

## 2023-04-28 RX ORDER — HYDROCODONE BITARTRATE AND ACETAMINOPHEN 5; 325 MG/1; MG/1
1 TABLET ORAL EVERY 6 HOURS PRN
Qty: 28 TABLET | Refills: 0 | Status: SHIPPED | OUTPATIENT
Start: 2023-04-28 | End: 2023-05-01

## 2023-04-28 RX ORDER — SODIUM CHLORIDE 0.9 % (FLUSH) 0.9 %
3 SYRINGE (ML) INJECTION
Status: DISCONTINUED | OUTPATIENT
Start: 2023-04-28 | End: 2023-04-28 | Stop reason: HOSPADM

## 2023-04-28 RX ORDER — FAMOTIDINE 10 MG/ML
INJECTION INTRAVENOUS
Status: DISCONTINUED | OUTPATIENT
Start: 2023-04-28 | End: 2023-04-28

## 2023-04-28 RX ORDER — CELECOXIB 200 MG/1
400 CAPSULE ORAL ONCE
Status: COMPLETED | OUTPATIENT
Start: 2023-04-28 | End: 2023-04-28

## 2023-04-28 RX ORDER — OXYCODONE HYDROCHLORIDE 5 MG/1
10 TABLET ORAL EVERY 4 HOURS PRN
Status: DISCONTINUED | OUTPATIENT
Start: 2023-04-28 | End: 2023-04-28 | Stop reason: HOSPADM

## 2023-04-28 RX ORDER — MUPIROCIN 20 MG/G
OINTMENT TOPICAL
Status: DISCONTINUED | OUTPATIENT
Start: 2023-04-28 | End: 2023-04-28 | Stop reason: HOSPADM

## 2023-04-28 RX ORDER — LIDOCAINE HYDROCHLORIDE 20 MG/ML
INJECTION INTRAVENOUS
Status: DISCONTINUED | OUTPATIENT
Start: 2023-04-28 | End: 2023-04-28

## 2023-04-28 RX ORDER — MIDAZOLAM HYDROCHLORIDE 1 MG/ML
.5-4 INJECTION INTRAMUSCULAR; INTRAVENOUS
Status: DISCONTINUED | OUTPATIENT
Start: 2023-04-28 | End: 2023-04-28 | Stop reason: HOSPADM

## 2023-04-28 RX ORDER — HYDROMORPHONE HYDROCHLORIDE 1 MG/ML
0.5 INJECTION, SOLUTION INTRAMUSCULAR; INTRAVENOUS; SUBCUTANEOUS
Status: DISCONTINUED | OUTPATIENT
Start: 2023-04-28 | End: 2023-04-28 | Stop reason: HOSPADM

## 2023-04-28 RX ORDER — PROPOFOL 10 MG/ML
VIAL (ML) INTRAVENOUS CONTINUOUS PRN
Status: DISCONTINUED | OUTPATIENT
Start: 2023-04-28 | End: 2023-04-28

## 2023-04-28 RX ADMIN — ONDANSETRON 4 MG: 2 INJECTION, SOLUTION INTRAMUSCULAR; INTRAVENOUS at 11:04

## 2023-04-28 RX ADMIN — ACETAMINOPHEN 1000 MG: 500 TABLET ORAL at 09:04

## 2023-04-28 RX ADMIN — MIDAZOLAM HYDROCHLORIDE 2 MG: 1 INJECTION, SOLUTION INTRAMUSCULAR; INTRAVENOUS at 09:04

## 2023-04-28 RX ADMIN — DEXAMETHASONE SODIUM PHOSPHATE 8 MG: 4 INJECTION, SOLUTION INTRAMUSCULAR; INTRAVENOUS at 11:04

## 2023-04-28 RX ADMIN — FAMOTIDINE 20 MG: 10 INJECTION, SOLUTION INTRAVENOUS at 11:04

## 2023-04-28 RX ADMIN — MUPIROCIN: 20 OINTMENT TOPICAL at 09:04

## 2023-04-28 RX ADMIN — LIDOCAINE HYDROCHLORIDE 30 ML: 20 INJECTION, SOLUTION EPIDURAL; INFILTRATION; INTRACAUDAL; PERINEURAL at 09:04

## 2023-04-28 RX ADMIN — CEFAZOLIN 2 G: 2 INJECTION, POWDER, FOR SOLUTION INTRAMUSCULAR; INTRAVENOUS at 11:04

## 2023-04-28 RX ADMIN — LIDOCAINE HYDROCHLORIDE 75 MG: 20 INJECTION INTRAVENOUS at 11:04

## 2023-04-28 RX ADMIN — CELECOXIB 400 MG: 200 CAPSULE ORAL at 09:04

## 2023-04-28 RX ADMIN — FENTANYL CITRATE 50 MCG: 50 INJECTION INTRAMUSCULAR; INTRAVENOUS at 09:04

## 2023-04-28 RX ADMIN — PROPOFOL 125 MCG/KG/MIN: 10 INJECTION, EMULSION INTRAVENOUS at 11:04

## 2023-04-28 RX ADMIN — SODIUM CHLORIDE: 9 INJECTION, SOLUTION INTRAVENOUS at 09:04

## 2023-04-28 NOTE — TRANSFER OF CARE
"Anesthesia Transfer of Care Note    Patient: Gina Dickson    Procedure(s) Performed: Procedure(s) (LRB):  REPAIR, LIGAMENT, COLLATERAL, THUMB - right thumb UCL with internal brace (Right)    Patient location: PACU    Anesthesia Type: regional and general    Transport from OR: Transported from OR on 6-10 L/min O2 by face mask with adequate spontaneous ventilation    Post pain: adequate analgesia    Post assessment: no apparent anesthetic complications and tolerated procedure well    Post vital signs: stable    Level of consciousness: awake, alert and oriented    Nausea/Vomiting: no nausea/vomiting    Complications: none    Transfer of care protocol was followed      Last vitals:   Visit Vitals  /61 (BP Location: Left arm, Patient Position: Lying)   Pulse (!) 57   Temp 36.6 °C (97.9 °F) (Tympanic)   Resp 20   Ht 5' 2" (1.575 m)   Wt 92.5 kg (204 lb)   LMP 04/10/2023 (Exact Date)   SpO2 100%   Breastfeeding No   BMI 37.31 kg/m²     "

## 2023-04-28 NOTE — ANESTHESIA PROCEDURE NOTES
Supraclavicular single shot    Patient location during procedure: pre-op   Block not for primary anesthetic.  Reason for block: at surgeon's request and post-op pain management   Post-op Pain Location: right hand pain   Start time: 4/28/2023 9:53 AM  Timeout: 4/28/2023 9:50 AM   End time: 4/28/2023 9:58 AM    Staffing  Authorizing Provider: Kaelyn Malik MD  Performing Provider: Kaelyn Malik MD    Preanesthetic Checklist  Completed: patient identified, IV checked, site marked, risks and benefits discussed, surgical consent, monitors and equipment checked, pre-op evaluation and timeout performed  Peripheral Block  Patient position: supine  Prep: ChloraPrep  Patient monitoring: heart rate, cardiac monitor, continuous pulse ox, continuous capnometry and frequent blood pressure checks  Block type: supraclavicular  Laterality: right  Injection technique: single shot  Needle  Needle type: Stimuplex   Needle gauge: 22 G  Needle length: 2 in  Needle localization: anatomical landmarks and ultrasound guidance   -ultrasound image captured on disc.  Assessment  Injection assessment: negative aspiration, negative parasthesia and local visualized surrounding nerve  Paresthesia pain: none  Heart rate change: no  Slow fractionated injection: yes    Medications:    Medications: lidocaine (PF) 20 mg/mL (2%) injection - Other   30 mL - 4/28/2023 9:50:00 AM    Additional Notes  VSS.  [facility] RN monitoring vitals throughout procedure.  Patient tolerated procedure well.

## 2023-04-28 NOTE — PLAN OF CARE
VSS.  Patient tolerating oral liquids without difficulty.   No apparent s&s of distress noted at this time, no complaints voiced at this time.   Discharge instructions reviewed with patient and mom with good verbal feedback received.   Post op medications delivered to bedside, new [place] prescription delivered to bedside, DME sling and bandage intact.  Patient ready for discharge.

## 2023-04-28 NOTE — ANESTHESIA PREPROCEDURE EVALUATION
"                                                                                                             04/28/2023  Gina Dickson is a 37 y.o., female.    Pre-operative evaluation for Procedure(s) (LRB):  REPAIR, LIGAMENT, COLLATERAL, THUMB - right thumb UCL with internal brace (Right)    Gina Dickson is a 37 y.o. female     Patient Active Problem List   Diagnosis    Uncontrolled hypertension    Depression with anxiety    Pain of right thumb    Decreased range of motion of right thumb       Review of patient's allergies indicates:  No Known Allergies    No current facility-administered medications on file prior to encounter.     Current Outpatient Medications on File Prior to Encounter   Medication Sig Dispense Refill    ALPRAZolam (XANAX) 0.5 MG tablet Take 0.5-1 tablets (0.25-0.5 mg total) by mouth 2 (two) times daily as needed (SIGNIFICANT ANXIETY). 60 tablet 0    metoprolol succinate (TOPROL-XL) 50 MG 24 hr tablet Take 1 tablet (50 mg total) by mouth once daily. 90 tablet 0    buPROPion (WELLBUTRIN XL) 150 MG TB24 tablet Take 1 tablet (150 mg total) by mouth once daily. 90 tablet 0    cetirizine (ZYRTEC) 10 MG tablet Take 1 tablet (10 mg total) by mouth every evening. for 10 days 10 tablet 0       Past Surgical History:   Procedure Laterality Date    WISDOM TOOTH EXTRACTION Bilateral 2011         CBC:  No results found for: WBC, RBC, HGB, HCT, PLT, MCV, MCH, MCHC    CMP:   No results found for: NA, K, CL, CO2, BUN, CREATININE, GLU, MG, PHOS, CALCIUM, ALBUMIN, PROT, ALKPHOS, ALT, AST, BILITOT    INR:  No results found for: PT, INR, PROTIME, APTT      Diagnostic Studies:        Pre-op Vitals [04/28/23 0847]   BP Pulse Resp Temp SpO2   (!) 140/83 (!) 53 18 36.6 °C (97.9 °F) 100 %      Height Weight BMI (Calculated)     5' 2" 204 lb 37.3            Pre-op Assessment    I have reviewed the Patient Summary Reports.     I have reviewed the Nursing Notes. I have reviewed the NPO Status.  "     Review of Systems  Anesthesia Hx:  No problems with previous Anesthesia    Social:  Non-Smoker, No Alcohol Use    Cardiovascular:   Exercise tolerance: good Hypertension    Psych:   Psychiatric History          Physical Exam  General: Well nourished    Airway:  Mallampati: II   Mouth Opening: Normal  TM Distance: Normal      Dental:  Intact    Chest/Lungs:  Clear to auscultation, Normal Respiratory Rate    Heart:  Rhythm: Regular Rhythm        Anesthesia Plan  Type of Anesthesia, risks & benefits discussed:    Anesthesia Type: Gen Natural Airway, Regional, Gen ETT  Intra-op Monitoring Plan: Standard ASA Monitors  Post Op Pain Control Plan: peripheral nerve block, multimodal analgesia and IV/PO Opioids PRN  Induction:  IV  Informed Consent: Informed consent signed with the Patient and all parties understand the risks and agree with anesthesia plan.  All questions answered.   ASA Score: 2    Ready For Surgery From Anesthesia Perspective.     .

## 2023-04-28 NOTE — OP NOTE
DATE OF PROCEDURE:  04/28/2023     SERVICE:  Orthopedics.     SURGEON:  Edvin Elias M.D.     ASSISTANT: SMA Melly     PREOPERATIVE DIAGNOSIS:  Right thumb ulnar collateral ligament tear with Stener lesion     POSTOPERATIVE DIAGNOSIS:  Same     PROCEDURES PERFORMED:  Right thumb ulnar collateral ligament repair with internal brace.     IMPLANTS:  Arthrex SwiveLock anchors x2 with suture tape and 3-0 FiberWire   suture material.     TOURNIQUET TIME: 31 minutes.     ESTIMATED BLOOD LOSS:  3 mL     IV FLUIDS:  Crystalloid.     ANESTHESIA:  Regional MAC.     COMPLICATIONS:  None.     PACKS AND DRAINS:  None.     SPECIMENS:  None.     INDICATION FOR PROCEDURE:   the patient is a 37-year-old female who presented to me with instability and pain of the right thumb ulnar collateral ligament at the MCP   joint and the risks, benefits and alternatives to operative intervention were   discussed with the patient in detail.  Specific risks discussed   included but were not limited to posttraumatic arthritis, pain, stiffness,   scarring, need for prolonged postoperative rehabilitation, damage to major   neurovascular structures, failure to return to same level of play, numbness,   tingling, complex regional pain syndrome, infection, DVT, PE, MI, CVA, damage to   the radial sensory nerve, neuroma, persistent instability, surgical failure,   need for additional surgery and death amongst others.  The patient  agreed to these risks and her mother provided informed written consent   for the procedure.     PROCEDURE IN DETAIL:  On the date of operative intervention, the patient was   evaluated in the preoperative holding area.  With her participation, the right  thumb was marked as the operative site.   She was then administered regional   anesthesia and transferred to the OR Suite with the right upper extremity was   then placed on a hand table.  Nonsterile tourniquet was placed high on the   Patient's right upper arm and all  superficial bony prominences were well padded   with SCDs in place.  The right upper extremity was then prepped and draped in   usual sterile fashion.  Timeout was taken to confirm by all present to confirm   the correct patient, site, procedure, and administration of preoperative   antibiotics.  All were in agreement, so we proceeded.  I first proceeded with an   examination under anesthesia of the patient's right thumb.  There was   significant laxity to stress of the right thumb MCP joint ulnar collateral   ligament in both full extension and at 30° of flexion..  I then marked out an approximately 3 cm   incision overlying the ulnar aspect of the patient's right thumb MCP joint.  I   then exsanguinated the right upper extremity with an Esmarch and inflated   tourniquet to 250 mmHg where it remained for the duration of the procedure.    Skin incision was then made sharply with a scalpel in line with the ulnar aspect   of the right thumb MCP joint.  I then carried my dissection down to skin and   subcutaneous tissues.  The radial sensory nerve was identified and retracted out   of harm's way for the duration of the procedure.  I then carried my dissection   down to the subcutaneous tissues to the level of the adductor aponeurosis.   There was quite evident a Stener lesion.  I was able to freely mobilize the rupture ulnar collateral ligament free from the abductor hyper no cirrhosis at this time.  The abductor aponeurosis was sharply incised. I then identified that the UCL ligament had completely avulsed off the proximal phalanx.  The   ulnar collateral ligament was able to be freed and mobilized at this time, and it was amenable to primary repair and advancement to the footprint after   mobilization.  I then identified the bare spot on the base of the ulnar aspect   of the proximal phalanx of the right thumb where the ligament had previously   been avulsed from and cleaned this off of soft tissue to prepare the  footprint   for reinsertion.   I then inserted the guidewire and drilled to prepare the   proximal phalanx for anchor implantation in our primary repair.  Fluoroscopy was utilized to verify placement of our anchor.  The Arthrex   SwiveLock fork-tipped anchor was then loaded with a 3-0 FiberWire as well as   suture tape and these were inserted into the previously drilled hole.  The 3-0 FiberWire was then utilized for primary repair of the right thumb UCL back down to its anatomic footprint.  This was done with the MCP joint   in approximately 30 degrees of flexion at the isometric point.  After this was   done, I then proceeded with augmentation of repair with Arthrex and suture tape.    I then made a small 3-mm longitudinal incision just proximal to the origin of   the ulnar collateral ligament at the distal aspect of the first metacarpal head   and placed a guidewire and then overdrilled this in the standard fashion.  A   second Arthrex SwiveLock four-tipped anchor was then loaded, the previously   placed suture tape and this anchor was then inserted into the distal aspect of   the first metacarpal.  The wires and anchors were placed with fluoroscopic guidance at the isometric points of both the origin and insertion of the ligament.  The suture limbs had been tensioned appropriately prior   to insertion and again the MCP joint was held in approximately 30 degrees of   flexion during insertion.  The suture limbs were then cut and stability of the   MCP joint was then checked and it had been restored.  There was  full passive   range of motion of the right thumb metacarpophalangeal joint at this time, with   no restriction.  Full flexion and extension was achieved.   stability of the repair and the MCP joint was excellent.  After this was done,   I then turned my attention towards completion of procedure.  The wound was then   copiously irrigated with sterile saline and the adductor aponeurosis was then repaired with  4 0 Vicryl suture.   subcutaneous tissues were closed with 4 0 Vicryl and then skin was closed with a running subcuticular 4-0 Monocryl.  Dermabond glue was then applied and allowed to fully dry.  Sterile dressings were then applied   consisting of Xeroform, 4 x 4 gauze and a thumb spica splint to the right upper  extremity.  Tourniquet was deflated and brisk capillary refill in Hoh throughout the entire right upper extremity and specifically to the thumb.   The patient was then awakened from anesthesia and returned to   Postanesthesia Care in stable condition.  There were no complications.  As an   attending surgeon, I performed the entire procedure.     POSTOPERATIVE PLAN FOR THE PATIENT:  The patient will be discharged home in   stable condition.   She will remain nonweightbearing to the right upper  extremity.  I will reevaluate her in clinic in approximately two weeks for   suture removal and reevaluation of her postoperative plan.  She will be referred for occupational therapy prior to her initial return clinic appointment at which time she will undergo fabrication of a custom hand based thumb spica orthosis and initiation of thumb IP range of motion per protocol with progression from there.  Follow-up in 2 weeks for suture removal.

## 2023-04-28 NOTE — PLAN OF CARE
Nursing pre-op complete. Pt calm, will continue to monitor. Pt's mother visiting at bedside. Belongings placed in locker #11, 1 plastic bag and purse. Pt's mother will keep pt's phone.

## 2023-04-29 ENCOUNTER — PATIENT MESSAGE (OUTPATIENT)
Dept: ORTHOPEDICS | Facility: CLINIC | Age: 38
End: 2023-04-29
Payer: COMMERCIAL

## 2023-04-29 NOTE — ANESTHESIA POSTPROCEDURE EVALUATION
Anesthesia Post Evaluation    Patient: Gina Dickson    Procedure(s) Performed: Procedure(s) (LRB):  REPAIR, LIGAMENT, COLLATERAL, THUMB - right thumb UCL with internal brace (Right)    Final Anesthesia Type: general      Patient location during evaluation: PACU  Patient participation: Yes- Able to Participate  Level of consciousness: awake and alert  Post-procedure vital signs: reviewed and stable  Pain management: adequate  Airway patency: patent    PONV status at discharge: No PONV  Anesthetic complications: no      Cardiovascular status: blood pressure returned to baseline  Respiratory status: unassisted  Hydration status: euvolemic  Follow-up not needed.          Vitals Value Taken Time   /108 04/28/23 1235   Temp 36.4 °C (97.6 °F) 04/28/23 1159   Pulse 61 04/28/23 1236   Resp 40 04/28/23 1236   SpO2 99 % 04/28/23 1236   Vitals shown include unvalidated device data.      Event Time   Out of Recovery 12:15:00         Pain/Adam Score: Pain Rating Prior to Med Admin: 5 (4/28/2023  9:55 AM)  Pain Rating Post Med Admin: 0 (4/28/2023 10:00 AM)  Adam Score: 10 (4/28/2023 12:15 PM)

## 2023-05-01 DIAGNOSIS — S63.641A RUPTURE OF ULNAR COLLATERAL LIGAMENT OF RIGHT THUMB, INITIAL ENCOUNTER: Primary | ICD-10-CM

## 2023-05-01 RX ORDER — HYDROCODONE BITARTRATE AND ACETAMINOPHEN 7.5; 325 MG/1; MG/1
1 TABLET ORAL EVERY 6 HOURS PRN
Qty: 28 TABLET | Refills: 0 | Status: SHIPPED | OUTPATIENT
Start: 2023-05-01 | End: 2023-08-04 | Stop reason: CLARIF

## 2023-05-01 NOTE — DISCHARGE SUMMARY
[facility] (Hospital)  Discharge Note  Short Stay    Procedure(s) (LRB):  REPAIR, LIGAMENT, COLLATERAL, THUMB - right thumb UCL with internal brace (Right)      OUTCOME: Patient tolerated treatment/procedure well without complication and is now ready for discharge.    DISPOSITION: Home or Self Care    FINAL DIAGNOSIS:  <principal problem not specified>    FOLLOWUP: In clinic    DISCHARGE INSTRUCTIONS:    Discharge Procedure Orders   Diet general     Leave dressing on - Keep it clean, dry, and intact until clinic visit     Call MD for:  temperature >100.4     Call MD for:  persistent nausea and vomiting     Call MD for:  severe uncontrolled pain     Call MD for:  difficulty breathing, headache or visual disturbances     Call MD for:  redness, tenderness, or signs of infection (pain, swelling, redness, odor or green/yellow discharge around incision site)     Call MD for:  hives     Call MD for:  persistent dizziness or light-headedness     Call MD for:  extreme fatigue        TIME SPENT ON DISCHARGE: 2 minutes

## 2023-05-02 ENCOUNTER — TELEPHONE (OUTPATIENT)
Dept: FAMILY MEDICINE | Facility: CLINIC | Age: 38
End: 2023-05-02
Payer: COMMERCIAL

## 2023-05-02 DIAGNOSIS — F41.9 ANXIETY: Primary | ICD-10-CM

## 2023-05-02 RX ORDER — HYDROXYZINE PAMOATE 50 MG/1
50 CAPSULE ORAL EVERY 8 HOURS PRN
Qty: 30 CAPSULE | Refills: 0 | Status: SHIPPED | OUTPATIENT
Start: 2023-05-02 | End: 2023-05-08

## 2023-05-02 NOTE — TELEPHONE ENCOUNTER
----- Message from Tonny Nagel MA sent at 5/2/2023  3:38 PM CDT -----  Contact: Patient, [phone]    ----- Message -----  From: Aracelis Mcpherson  Sent: 5/1/2023   9:22 AM CDT  To: Yvan PEREZ Staff    Calling to speak with the nurse regarding she is very depressed today, she lost her sister yesterday.  Wants to know if she can up her medication. Please call her. Thanks.

## 2023-05-03 ENCOUNTER — TELEPHONE (OUTPATIENT)
Dept: FAMILY MEDICINE | Facility: CLINIC | Age: 38
End: 2023-05-03
Payer: COMMERCIAL

## 2023-05-05 ENCOUNTER — TELEPHONE (OUTPATIENT)
Dept: PRIMARY CARE CLINIC | Facility: CLINIC | Age: 38
End: 2023-05-05

## 2023-05-05 ENCOUNTER — OFFICE VISIT (OUTPATIENT)
Dept: PRIMARY CARE CLINIC | Facility: CLINIC | Age: 38
End: 2023-05-05
Payer: COMMERCIAL

## 2023-05-05 DIAGNOSIS — E66.01 SEVERE OBESITY (BMI 35.0-39.9) WITH COMORBIDITY: ICD-10-CM

## 2023-05-05 DIAGNOSIS — I10 UNCONTROLLED HYPERTENSION: Primary | ICD-10-CM

## 2023-05-05 DIAGNOSIS — D57.3 SICKLE-CELL TRAIT: ICD-10-CM

## 2023-05-05 PROCEDURE — 99214 PR OFFICE/OUTPT VISIT, EST, LEVL IV, 30-39 MIN: ICD-10-PCS | Mod: 95,,, | Performed by: FAMILY MEDICINE

## 2023-05-05 PROCEDURE — 1159F PR MEDICATION LIST DOCUMENTED IN MEDICAL RECORD: ICD-10-PCS | Mod: CPTII,95,, | Performed by: FAMILY MEDICINE

## 2023-05-05 PROCEDURE — 99214 OFFICE O/P EST MOD 30 MIN: CPT | Mod: 95,,, | Performed by: FAMILY MEDICINE

## 2023-05-05 PROCEDURE — 1160F PR REVIEW ALL MEDS BY PRESCRIBER/CLIN PHARMACIST DOCUMENTED: ICD-10-PCS | Mod: CPTII,95,, | Performed by: FAMILY MEDICINE

## 2023-05-05 PROCEDURE — 1159F MED LIST DOCD IN RCRD: CPT | Mod: CPTII,95,, | Performed by: FAMILY MEDICINE

## 2023-05-05 PROCEDURE — 1160F RVW MEDS BY RX/DR IN RCRD: CPT | Mod: CPTII,95,, | Performed by: FAMILY MEDICINE

## 2023-05-05 RX ORDER — CLONIDINE HYDROCHLORIDE 0.1 MG/1
0.1 TABLET ORAL 2 TIMES DAILY
Qty: 60 TABLET | Refills: 5 | Status: SHIPPED | OUTPATIENT
Start: 2023-05-05 | End: 2023-05-26

## 2023-05-05 RX ORDER — AMLODIPINE BESYLATE 5 MG/1
5 TABLET ORAL DAILY
Qty: 30 TABLET | Refills: 5 | Status: SHIPPED | OUTPATIENT
Start: 2023-05-05 | End: 2023-05-26

## 2023-05-05 NOTE — TELEPHONE ENCOUNTER
----- Message from Christa Santoro MD sent at 5/3/2023 10:08 AM CDT -----  Contact: Patient, [phone]  Please schedule a virtual visit on Friday for blood pressure follow up  ----- Message -----  From: Tonny Nagel MA  Sent: 5/2/2023   4:27 PM CDT  To: Christa Santoro MD    Pt stated  she needed to know what to do about her BP being high she stated she went to the ER for Hypertension crisis . Pt stated do you want her to double up on BP meds . Pt stated you can cancel the Rx you sent to the pharmacy because she has an appointment with physic tomorrow .   ----- Message -----  From: Christa Santoro MD  Sent: 5/2/2023   4:00 PM CDT  To: Tonny Nagel MA    Rx for Vistaril sent.  ----- Message -----  From: Tonny Nagel MA  Sent: 5/2/2023   3:39 PM CDT  To: Christa Santoro MD      ----- Message -----  From: Aracelis Vida  Sent: 5/1/2023   9:22 AM CDT  To: Yvan PEREZ Staff    Calling to speak with the nurse regarding she is very depressed today, she lost her sister yesterday.  Wants to know if she can up her medication. Please call her. Thanks.

## 2023-05-05 NOTE — PROGRESS NOTES
The patient location is: [place]  The chief complaint leading to consultation is: high blood pressure    Visit type: audiovisual    Face to Face time with patient:   22 minutes of total time spent on the encounter, which includes face to face time and non-face to face time preparing to see the patient (eg, review of tests), Obtaining and/or reviewing separately obtained history, Documenting clinical information in the electronic or other health record, Independently interpreting results (not separately reported) and communicating results to the patient/family/caregiver, or Care coordination (not separately reported).         Each patient to whom he or she provides medical services by telemedicine is:  (1) informed of the relationship between the physician and patient and the respective role of any other health care provider with respect to management of the patient; and (2) notified that he or she may decline to receive medical services by telemedicine and may withdraw from such care at any time.    Notes:    HPI:  Patient is a 37 year old here for follow-up of severely uncontrolled hypertension, anxiety and depression.  Patient reports her sister-in-law passed away 5 days ago.  She continues to have some fairly elevated blood pressures, blood pressure reading 163/111 and 150/103.  She was seen in the emergency room recently and prescribed clonidine which seemed to get her blood pressure down.  Patient reports she is having severe emotional distress as a result of recent deaths of close family members.  She did see Psychiatry and is under treatment.      Physical exam:  General: Alert, obese  HEENT:  NC/AT  Neck:  supple  Resp:  Normal effort, no respiratory distress   Psych:  Patient with emotional distress, severe anxiety and agitation    Diagnoses and all orders for this visit:    Uncontrolled hypertension  Patient is to continue metoprolol 50 mg daily.  Will add amlodipine 5 mg daily.  Advised to take clonidine for  blood pressures greater than 160/100.  Patient is to follow-up in 2 weeks for blood pressure recheck  -     cloNIDine (CATAPRES) 0.1 MG tablet; Take 1 tablet (0.1 mg total) by mouth 2 (two) times daily.  -     amLODIPine (NORVASC) 5 MG tablet; Take 1 tablet (5 mg total) by mouth once daily.    Severe obesity (BMI 35.0-39.9) with comorbidity  Patient with moderate weight gain over the past 6 months.  This may be contributing to elevated blood pressures as well.    Sickle-cell trait  Clinically asymptomatic

## 2023-05-05 NOTE — PROGRESS NOTES
Subjective:       Patient ID: Gina Dickson is a 37 y.o. female.    Chief Complaint: No chief complaint on file.    HPI    Review of patient's allergies indicates:  No Known Allergies      Current Outpatient Medications:     acetaminophen (TYLENOL) 650 MG TbSR, Take 1 tablet (650 mg total) by mouth every 8 (eight) hours., Disp: 90 tablet, Rfl: 0    ALPRAZolam (XANAX) 0.5 MG tablet, Take 0.5-1 tablets (0.25-0.5 mg total) by mouth 2 (two) times daily as needed (SIGNIFICANT ANXIETY)., Disp: 60 tablet, Rfl: 0    buPROPion (WELLBUTRIN XL) 150 MG TB24 tablet, Take 1 tablet (150 mg total) by mouth once daily., Disp: 90 tablet, Rfl: 0    cetirizine (ZYRTEC) 10 MG tablet, Take 1 tablet (10 mg total) by mouth every evening. for 10 days, Disp: 10 tablet, Rfl: 0    HYDROcodone-acetaminophen (NORCO) 7.5-325 mg per tablet, Take 1 tablet by mouth every 6 (six) hours as needed for Pain., Disp: 28 tablet, Rfl: 0    hydrOXYzine pamoate (VISTARIL) 50 MG Cap, Take 1 capsule (50 mg total) by mouth every 8 (eight) hours as needed (anxiety)., Disp: 30 capsule, Rfl: 0    ibuprofen (ADVIL,MOTRIN) 600 MG tablet, Take 1 tablet (600 mg total) by mouth 3 (three) times daily., Disp: 90 tablet, Rfl: 0    metoprolol succinate (TOPROL-XL) 50 MG 24 hr tablet, Take 1 tablet (50 mg total) by mouth once daily., Disp: 90 tablet, Rfl: 0    traMADoL (ULTRAM) 50 mg tablet, Take 1 tablet (50 mg total) by mouth every 6 (six) hours., Disp: 20 tablet, Rfl: 0    Past Medical History:   Diagnosis Date    Fibroids     Hypertension       Past Surgical History:   Procedure Laterality Date    REPAIR OF COLLATERAL LIGAMENT OF THUMB Right 4/28/2023    Procedure: REPAIR, LIGAMENT, COLLATERAL, THUMB - right thumb UCL with internal brace;  Surgeon: Edvin Elias MD;  Location: [facility];  Service: Orthopedics;  Laterality: Right;    WISDOM TOOTH EXTRACTION Bilateral 2011      Social History     Socioeconomic History    Marital status: Single   Tobacco Use     Smoking status: Former    Smokeless tobacco: Never   Substance and Sexual Activity    Alcohol use: No    Drug use: Yes     Types: Marijuana    Sexual activity: Yes     Partners: Male     Birth control/protection: None     Social Determinants of Health     Financial Resource Strain: Low Risk     Difficulty of Paying Living Expenses: Not hard at all   Food Insecurity: No Food Insecurity    Worried About Running Out of Food in the Last Year: Never true    Ran Out of Food in the Last Year: Never true   Transportation Needs: No Transportation Needs    Lack of Transportation (Medical): No    Lack of Transportation (Non-Medical): No   Physical Activity: Sufficiently Active    Days of Exercise per Week: 7 days    Minutes of Exercise per Session: 60 min   Stress: Stress Concern Present    Feeling of Stress : Very much   Social Connections: Moderately Isolated    Frequency of Communication with Friends and Family: More than three times a week    Frequency of Social Gatherings with Friends and Family: More than three times a week    Attends Rastafari Services: 1 to 4 times per year    Active Member of Clubs or Organizations: No    Attends Club or Organization Meetings: Never    Marital Status: Never    Housing Stability: Low Risk     Unable to Pay for Housing in the Last Year: No    Number of Places Lived in the Last Year: 1    Unstable Housing in the Last Year: No      Family History   Problem Relation Age of Onset    Lupus Mother     Hypertension Father     Cancer Maternal Aunt         colon, breast     Review of Systems    Objective:      Physical Exam    Assessment:       No diagnosis found.    Plan:         There are no diagnoses linked to this encounter.      Christa Santoro MD

## 2023-05-08 ENCOUNTER — TELEPHONE (OUTPATIENT)
Dept: ORTHOPEDICS | Facility: CLINIC | Age: 38
End: 2023-05-08
Payer: COMMERCIAL

## 2023-05-08 ENCOUNTER — OFFICE VISIT (OUTPATIENT)
Dept: PSYCHIATRY | Facility: CLINIC | Age: 38
End: 2023-05-08
Payer: COMMERCIAL

## 2023-05-08 ENCOUNTER — OFFICE VISIT (OUTPATIENT)
Dept: ORTHOPEDICS | Facility: CLINIC | Age: 38
End: 2023-05-08
Payer: COMMERCIAL

## 2023-05-08 ENCOUNTER — PATIENT OUTREACH (OUTPATIENT)
Dept: ADMINISTRATIVE | Facility: OTHER | Age: 38
End: 2023-05-08
Payer: COMMERCIAL

## 2023-05-08 DIAGNOSIS — F33.1 DEPRESSION, MAJOR, RECURRENT, MODERATE: Primary | ICD-10-CM

## 2023-05-08 DIAGNOSIS — F41.9 ANXIETY DISORDER, UNSPECIFIED TYPE: ICD-10-CM

## 2023-05-08 DIAGNOSIS — Z98.890 POSTOPERATIVE STATE: ICD-10-CM

## 2023-05-08 DIAGNOSIS — S63.641D RUPTURE OF ULNAR COLLATERAL LIGAMENT OF RIGHT THUMB, SUBSEQUENT ENCOUNTER: Primary | ICD-10-CM

## 2023-05-08 DIAGNOSIS — F43.21 GRIEF: ICD-10-CM

## 2023-05-08 PROCEDURE — 1159F PR MEDICATION LIST DOCUMENTED IN MEDICAL RECORD: ICD-10-PCS | Mod: CPTII,S$GLB,, | Performed by: PHYSICIAN ASSISTANT

## 2023-05-08 PROCEDURE — 99999 PR PBB SHADOW E&M-EST. PATIENT-LVL III: CPT | Mod: PBBFAC,,, | Performed by: PHYSICIAN ASSISTANT

## 2023-05-08 PROCEDURE — 99999 PR PBB SHADOW E&M-EST. PATIENT-LVL I: ICD-10-PCS | Mod: PBBFAC,,, | Performed by: PSYCHIATRY & NEUROLOGY

## 2023-05-08 PROCEDURE — 99024 POSTOP FOLLOW-UP VISIT: CPT | Mod: S$GLB,,, | Performed by: PHYSICIAN ASSISTANT

## 2023-05-08 PROCEDURE — 99024 PR POST-OP FOLLOW-UP VISIT: ICD-10-PCS | Mod: S$GLB,,, | Performed by: PHYSICIAN ASSISTANT

## 2023-05-08 PROCEDURE — 99215 PR OFFICE/OUTPT VISIT, EST, LEVL V, 40-54 MIN: ICD-10-PCS | Mod: S$GLB,,, | Performed by: PSYCHIATRY & NEUROLOGY

## 2023-05-08 PROCEDURE — 1159F MED LIST DOCD IN RCRD: CPT | Mod: CPTII,S$GLB,, | Performed by: PHYSICIAN ASSISTANT

## 2023-05-08 PROCEDURE — 99999 PR PBB SHADOW E&M-EST. PATIENT-LVL III: ICD-10-PCS | Mod: PBBFAC,,, | Performed by: PHYSICIAN ASSISTANT

## 2023-05-08 PROCEDURE — 99999 PR PBB SHADOW E&M-EST. PATIENT-LVL I: CPT | Mod: PBBFAC,,, | Performed by: PSYCHIATRY & NEUROLOGY

## 2023-05-08 PROCEDURE — 90833 PSYTX W PT W E/M 30 MIN: CPT | Mod: S$GLB,,, | Performed by: PSYCHIATRY & NEUROLOGY

## 2023-05-08 PROCEDURE — 99215 OFFICE O/P EST HI 40 MIN: CPT | Mod: S$GLB,,, | Performed by: PSYCHIATRY & NEUROLOGY

## 2023-05-08 PROCEDURE — 90833 PR PSYCHOTHERAPY W/PATIENT W/E&M, 30 MIN (ADD ON): ICD-10-PCS | Mod: S$GLB,,, | Performed by: PSYCHIATRY & NEUROLOGY

## 2023-05-08 RX ORDER — ALPRAZOLAM 0.5 MG/1
.25-.5 TABLET ORAL 2 TIMES DAILY PRN
Qty: 60 TABLET | Refills: 0 | Status: SHIPPED | OUTPATIENT
Start: 2023-05-08 | End: 2023-06-07 | Stop reason: SDUPTHER

## 2023-05-08 RX ORDER — BUPROPION HYDROCHLORIDE 300 MG/1
300 TABLET ORAL DAILY
Qty: 90 TABLET | Refills: 0 | Status: SHIPPED | OUTPATIENT
Start: 2023-05-08 | End: 2023-06-07 | Stop reason: SDUPTHER

## 2023-05-08 NOTE — PATIENT INSTRUCTIONS
PLAN:     FOLLOW UP  June 7 2023 @ 10 am In Clinic         1) Meds: Advance wellbutrin XL to  300 mg mg once in morning / renew  xanax 0.5 mg f twice a day and reminded of as needed signif anxiety not per se / rolling daily use    2) She was again offered Intensive Outpatient Program (IOP) ; says now weill take info focused  mngr Ms Smith  and told pt to call  and coord time to see meet with Ms smith / I will also send ms smith to reach out to pt    3)   Reminded her to call [phone] and follow up to ariana scheduled  [facility] for  Intake    At length corky discussed therpay principles grief coping skills and gave info www.griefshare.org     References:     Relaxation stress reduction workbook: ANA Mandujano PhD ( used: $7-10)    Feeling Good Website: Francisco Javier Juarez MD / [email or web address] website (free) / jerry. PODCASTS    Anxiety &  phobia workbook by ALEXEI Seth PhD  (web retailers: used: $ 7-10)    [place]: Path to Better Sleep : https://www.veterantraining.[place].gov/insomnia/ (free)       Pt expressed appreciation for the visit today and did not have further question at this time though pt  was still informed to:     Call  if problems.    Call / Report Side Effects to Psyc MD     Encouraged to follow up with primary care / Gen Med MD for continued monitoring of general health and wellness.  Y

## 2023-05-08 NOTE — PROGRESS NOTES
CHW - Outreach Attempt    Community Health Worker left a voicemail message for 1st attempt to contact patient regarding: SDOH  Community Health Worker to attempt to contact patient on: 05/08/2023

## 2023-05-08 NOTE — PROGRESS NOTES
Gina Dickson   1985       Disclaimer: Evaluation and treatment is based on information presented to date. Any new information may affect assessment and findings.     Arrived late tho still s[ent 44 min as per  clinical need // note has not gotten tinto Social work intake .. as well reminded IOP     S: Patient's Own Perception of Condition (& Side Effects) : no side effects /   O:      CURRENT PRESENTATION: enters crying explaining now mother of ( brother's child) turns ujp dead  / and  Friday / assures no SI  no HI / says has supports tho stressful / not working / plan retujrn l;ate         Informs mother of her brother who just  /  herself / possible suicide / still unclear     Additional grief    Does say that has numerous family friend boyfriend who helpgi her cope  tho reminded her of IOP /     As well     1) Meds: Advance wellbutrin XL to  300 mg mg once in morning / renew  xanax 0.5 mg f twice a day and reminded of as needed signif anxiety not per se / rolling daily use    2) She was again offered Intensive Outpatient Program (IOP) ; says now weill take info focused  mngr Ms Peterson  and told pt to call  and coord time to see meet with Ms peterson / I will also send ms peterson to reach out to pt    3)   Reminded her to call [phone] and follow up to [facility] scheduled  [facility] for  Intake    At length corky discussed therpay principles grief coping skills and gave info [email or web address]     Psychotherapy:  Target symptoms: depression, anxiety grief   Why chosen therapy is appropriate versus another modality: relevant to diagnosi  Outcome monitoring methods: self-report and self ratings  Therapeutic intervention type: supportive psychotherapy  Topics discussed/themes: depression grief mant books web social suppors  The patient's response to the intervention is accepting.  Time 16 min    Ex:  some elements in session grief work      In regards to developing coping skills / as relates to dealing with the  loss (ie, death) of a friend or loved one:    Clinician discussed and explained:    1) journaling      2) importance of keeping a healthy balance between reflecting on a friend's death and their own self care needs. Recommended method to encourage patient to compartmentalize their grief to only during certan time(s) of day so that the individual can take care of their own self-care needs.  [Comment also made that while  person would want them to take care of themselves as well (versus neglecting needs of self.] [Note: Mar tmay utilize electronic watch with chime or alarm to think or refect on the hour OR at certain tike of day by setting alarm, and then giving self 1-10 min or sop to reflect THO to then focus on their own daily agenda / needs     3) importance of attempting to identifying others who were mutual friend of the   and periodically getting together to build new experiences while balancing with some healthy reminiscing    4) that the process of grieving is natural tho also important in the therapeutic process to also  remind and encouraging the patient to tend to their own self care needs.  Comment made that the eventual goal is spacing out the periods of grieving such that  the grief itself does not become or remain as all consuming.  Rather goal noted of aiming to  reminisce about the  person's memory annually (or  perhaps bi annually)  such as on a birth date or the date of meeting someone     >>    Constitutional Health Concerns: had surgery recent  for Rupture of ulnar collateral ligament of right thumb       Wt Readings from Last 3 Encounters:   23 91.2 kg (201 lb 1 oz)   23 92.5 kg (204 lb)   23 92.5 kg (204 lb)   ]     Laboratory Data  Admission on 2023, Discharged on 2023   Component Date Value Ref Range Status    WBC 2023 8.60  3.90 - 12.70 K/uL Final    RBC  04/30/2023 5.10  4.00 - 5.40 M/uL Final    Hemoglobin 04/30/2023 14.3  12.0 - 16.0 g/dL Final    Hematocrit 04/30/2023 40.3  37.0 - 48.5 % Final    MCV 04/30/2023 79 (L)  82 - 98 fL Final    MCH 04/30/2023 28.0  27.0 - 31.0 pg Final    MCHC 04/30/2023 35.5  32.0 - 36.0 g/dL Final    RDW 04/30/2023 14.2  11.5 - 14.5 % Final    Platelets 04/30/2023 211  150 - 450 K/uL Final    MPV 04/30/2023 9.5  9.2 - 12.9 fL Final    Immature Granulocytes 04/30/2023 0.3  0.0 - 0.5 % Final    Gran # (ANC) 04/30/2023 4.9  1.8 - 7.7 K/uL Final    Immature Grans (Abs) 04/30/2023 0.03  0.00 - 0.04 K/uL Final    Lymph # 04/30/2023 3.2  1.0 - 4.8 K/uL Final    Mono # 04/30/2023 0.4  0.3 - 1.0 K/uL Final    Eos # 04/30/2023 0.1  0.0 - 0.5 K/uL Final    Baso # 04/30/2023 0.05  0.00 - 0.20 K/uL Final    nRBC 04/30/2023 0  0 /100 WBC Final    Gran % 04/30/2023 56.7  38.0 - 73.0 % Final    Lymph % 04/30/2023 37.0  18.0 - 48.0 % Final    Mono % 04/30/2023 4.7  4.0 - 15.0 % Final    Eosinophil % 04/30/2023 0.7  0.0 - 8.0 % Final    Basophil % 04/30/2023 0.6  0.0 - 1.9 % Final    Differential Method 04/30/2023 Automated   Final    Sodium 04/30/2023 136  136 - 145 mmol/L Final    Potassium 04/30/2023 4.0  3.5 - 5.1 mmol/L Final    Chloride 04/30/2023 104  95 - 110 mmol/L Final    CO2 04/30/2023 22 (L)  23 - 29 mmol/L Final    Glucose 04/30/2023 89  70 - 110 mg/dL Final    BUN 04/30/2023 13  6 - 20 mg/dL Final    Creatinine 04/30/2023 0.9  0.5 - 1.4 mg/dL Final    Calcium 04/30/2023 9.4  8.7 - 10.5 mg/dL Final    Total Protein 04/30/2023 7.6  6.0 - 8.4 g/dL Final    Albumin 04/30/2023 4.2  3.5 - 5.2 g/dL Final    Total Bilirubin 04/30/2023 0.6  0.1 - 1.0 mg/dL Final    Alkaline Phosphatase 04/30/2023 83  55 - 135 U/L Final    AST 04/30/2023 15  10 - 40 U/L Final    ALT 04/30/2023 12  10 - 44 U/L Final    Anion Gap 04/30/2023 10  8 - 16 mmol/L Final    eGFR 04/30/2023 >60.0  >60 mL/min/1.73 m^2 Final    Troponin I 04/30/2023 <0.006  0.000 - 0.026  ng/mL Final    BNP 04/30/2023 28  0 - 99 pg/mL Final   Admission on 04/28/2023, Discharged on 04/28/2023   Component Date Value Ref Range Status    POC Preg Test, Ur 04/28/2023 Negative  Negative Final     Acceptable 04/28/2023 Yes   Final        Mental Status Exam:      Appearance: casual   Oriented: x 3   Attitude: cooperative   Eye Contact: good  Behavior: calm tho tearful at times    Mood: depressed grief  Cognition: alert  Concentration: grossly intact   Affect: appropriate range      Anxiety: signif      Thought Process: goal directed     Speech:       Volume : WNL       Quantity WNL       Quality: appears to openly answer questions      Threats: no SI / no HI     Psychosis: denies all      Estimate of Intellectual Function: average   Impulse Control: no thoughts of harm to self/ others      Musculoskeletal: Rupture of ulnar collateral ligament of right thumb    Social: has supports     Patient Active Problem List   Diagnosis    Uncontrolled hypertension    Depression with anxiety    Pain of right thumb    Decreased range of motion of right thumb    Severe obesity (BMI 35.0-39.9) with comorbidity    Sickle-cell trait    Anxiety disorder    Depression, major, recurrent, moderateto severe    Grief          Current Outpatient Medications:     acetaminophen (TYLENOL) 650 MG TbSR, Take 1 tablet (650 mg total) by mouth every 8 (eight) hours., Disp: 90 tablet, Rfl: 0    ALPRAZolam (XANAX) 0.5 MG tablet, Take 0.5-1 tablets (0.25-0.5 mg total) by mouth 2 (two) times daily as needed (SIGNIFICANT ANXIETY)., Disp: 60 tablet, Rfl: 0    amLODIPine (NORVASC) 5 MG tablet, Take 1 tablet (5 mg total) by mouth once daily., Disp: 30 tablet, Rfl: 5    buPROPion (WELLBUTRIN XL) 300 MG 24 hr tablet, Take 1 tablet (300 mg total) by mouth once daily., Disp: 90 tablet, Rfl: 0    cetirizine (ZYRTEC) 10 MG tablet, Take 1 tablet (10 mg total) by mouth every evening. for 10 days, Disp: 10 tablet, Rfl: 0    cloNIDine  (CATAPRES) 0.1 MG tablet, Take 1 tablet (0.1 mg total) by mouth 2 (two) times daily., Disp: 60 tablet, Rfl: 5    HYDROcodone-acetaminophen (NORCO) 7.5-325 mg per tablet, Take 1 tablet by mouth every 6 (six) hours as needed for Pain., Disp: 28 tablet, Rfl: 0    ibuprofen (ADVIL,MOTRIN) 600 MG tablet, Take 1 tablet (600 mg total) by mouth 3 (three) times daily., Disp: 90 tablet, Rfl: 0    metoprolol succinate (TOPROL-XL) 50 MG 24 hr tablet, Take 1 tablet (50 mg total) by mouth once daily., Disp: 90 tablet, Rfl: 0     Social History     Tobacco Use   Smoking Status Former   Smokeless Tobacco Never        Review of patient's allergies indicates:  No Known Allergies     ASSESSMENT:   Encounter Diagnoses   Name Primary?    Anxiety disorder, unspecified type     Depression, major, recurrent, moderateto severe Yes    Grief      Patient Instructions       PLAN:     FOLLOW UP  June 7 2023 @ 10 am In Clinic         1) Meds: Advance wellbutrin XL to  300 mg mg once in morning / renew  xanax 0.5 mg f twice a day and reminded of as needed signif anxiety not per se / rolling daily use    2) She was again offered Intensive Outpatient Program (IOP) ; says now weill take info focused  mngr Ms Smith  and told pt to call  and coord time to see meet with Ms smith / I will also send ms smith to reach out to pt    3)   Reminded her to call [phone] and follow up to [facility] scheduled  [facility] for  Intake    At length corky discussed therpay principles grief coping skills and gave info www.[email or web address]     References:     Relaxation stress reduction workbook: ANA Mandujano PhD ( used: $7-10)    Feeling Good Website: Francisco Javier Juarez MD / www.feelingTiansheng.com website (free) / jerry. PODCASTS    Anxiety &  phobia workbook by ALEXEI Seth PhD  (web retailers: used: $ 7-10)    [place]: Path to Better Sleep : https://www.veterantraining.va.gov/insomnia/ (free)       Pt expressed appreciation for the visit today and did not  have further question at this time though pt  was still informed to:     Call  if problems.    Call / Report Side Effects to Psyc MD     Encouraged to follow up with primary care / Gen Med MD for continued monitoring of general health and wellness.  Y

## 2023-05-10 ENCOUNTER — OFFICE VISIT (OUTPATIENT)
Dept: PRIMARY CARE CLINIC | Facility: CLINIC | Age: 38
End: 2023-05-10
Payer: COMMERCIAL

## 2023-05-10 VITALS
WEIGHT: 192.44 LBS | SYSTOLIC BLOOD PRESSURE: 159 MMHG | BODY MASS INDEX: 35.41 KG/M2 | HEART RATE: 92 BPM | HEIGHT: 62 IN | OXYGEN SATURATION: 98 % | DIASTOLIC BLOOD PRESSURE: 103 MMHG

## 2023-05-10 DIAGNOSIS — I10 UNCONTROLLED HYPERTENSION: Primary | ICD-10-CM

## 2023-05-10 DIAGNOSIS — F41.8 DEPRESSION WITH ANXIETY: ICD-10-CM

## 2023-05-10 PROCEDURE — 1159F PR MEDICATION LIST DOCUMENTED IN MEDICAL RECORD: ICD-10-PCS | Mod: CPTII,S$GLB,, | Performed by: FAMILY MEDICINE

## 2023-05-10 PROCEDURE — 3080F DIAST BP >= 90 MM HG: CPT | Mod: CPTII,S$GLB,, | Performed by: FAMILY MEDICINE

## 2023-05-10 PROCEDURE — 99999 PR PBB SHADOW E&M-EST. PATIENT-LVL III: CPT | Mod: PBBFAC,,, | Performed by: FAMILY MEDICINE

## 2023-05-10 PROCEDURE — 1160F RVW MEDS BY RX/DR IN RCRD: CPT | Mod: CPTII,S$GLB,, | Performed by: FAMILY MEDICINE

## 2023-05-10 PROCEDURE — 99213 PR OFFICE/OUTPT VISIT, EST, LEVL III, 20-29 MIN: ICD-10-PCS | Mod: S$GLB,,, | Performed by: FAMILY MEDICINE

## 2023-05-10 PROCEDURE — 3077F PR MOST RECENT SYSTOLIC BLOOD PRESSURE >= 140 MM HG: ICD-10-PCS | Mod: CPTII,S$GLB,, | Performed by: FAMILY MEDICINE

## 2023-05-10 PROCEDURE — 3077F SYST BP >= 140 MM HG: CPT | Mod: CPTII,S$GLB,, | Performed by: FAMILY MEDICINE

## 2023-05-10 PROCEDURE — 99213 OFFICE O/P EST LOW 20 MIN: CPT | Mod: S$GLB,,, | Performed by: FAMILY MEDICINE

## 2023-05-10 PROCEDURE — 3080F PR MOST RECENT DIASTOLIC BLOOD PRESSURE >= 90 MM HG: ICD-10-PCS | Mod: CPTII,S$GLB,, | Performed by: FAMILY MEDICINE

## 2023-05-10 PROCEDURE — 3008F PR BODY MASS INDEX (BMI) DOCUMENTED: ICD-10-PCS | Mod: CPTII,S$GLB,, | Performed by: FAMILY MEDICINE

## 2023-05-10 PROCEDURE — 1159F MED LIST DOCD IN RCRD: CPT | Mod: CPTII,S$GLB,, | Performed by: FAMILY MEDICINE

## 2023-05-10 PROCEDURE — 1160F PR REVIEW ALL MEDS BY PRESCRIBER/CLIN PHARMACIST DOCUMENTED: ICD-10-PCS | Mod: CPTII,S$GLB,, | Performed by: FAMILY MEDICINE

## 2023-05-10 PROCEDURE — 99999 PR PBB SHADOW E&M-EST. PATIENT-LVL III: ICD-10-PCS | Mod: PBBFAC,,, | Performed by: FAMILY MEDICINE

## 2023-05-10 PROCEDURE — 3008F BODY MASS INDEX DOCD: CPT | Mod: CPTII,S$GLB,, | Performed by: FAMILY MEDICINE

## 2023-05-12 NOTE — PROGRESS NOTES
Subjective     Patient ID: Gina Dickson is a 37 y.o. female.    Chief Complaint: No chief complaint on file.    HPI:  Patient is a 37 year old female here for follow up uncontrolled HTN and anxiety.  Patient having severe anxiety attack this am.  She is currently being managed by psychiatry.    Review of Systems   Psychiatric/Behavioral:  Positive for dysphoric mood. The patient is nervous/anxious.         Objective     Physical Exam  Constitutional:       Appearance: She is obese.   Neurological:      Mental Status: She is alert.   Psychiatric:         Mood and Affect: Affect is tearful.          Assessment and Plan     Problem List Items Addressed This Visit    None    Diagnoses and all orders for this visit:    Uncontrolled hypertension  BP remains elevated.  Patient did not take BP medication today.  Recommend increasing amlodipine to 10 mg daily.  May need to consider clonidine twice daily regularly.    Depression with anxiety  Patient with severe anxiety and depression related to grief.  Followed by psychiatry.

## 2023-05-16 ENCOUNTER — CLINICAL SUPPORT (OUTPATIENT)
Dept: REHABILITATION | Facility: HOSPITAL | Age: 38
End: 2023-05-16
Attending: ORTHOPAEDIC SURGERY
Payer: COMMERCIAL

## 2023-05-16 DIAGNOSIS — M25.60 RANGE OF MOTION DEFICIT: ICD-10-CM

## 2023-05-16 DIAGNOSIS — M79.89 SWELLING OF RIGHT THUMB: ICD-10-CM

## 2023-05-16 DIAGNOSIS — M79.644 THUMB PAIN, RIGHT: ICD-10-CM

## 2023-05-16 DIAGNOSIS — S63.641A RUPTURE OF ULNAR COLLATERAL LIGAMENT OF RIGHT THUMB, INITIAL ENCOUNTER: ICD-10-CM

## 2023-05-16 PROCEDURE — 97166 OT EVAL MOD COMPLEX 45 MIN: CPT

## 2023-05-16 PROCEDURE — 97110 THERAPEUTIC EXERCISES: CPT

## 2023-05-16 NOTE — PLAN OF CARE
[facility]  Occupational Therapy Initial Evaluation    Date: 5/16/2023  Name: Gina Dickson  Clinic Number: [id number]    Therapy Diagnosis:   Encounter Diagnoses   Name Primary?    Rupture of ulnar collateral ligament of right thumb, initial encounter     Thumb pain, right     Range of motion deficit     Swelling of right thumb      Physician: Edvin Elias MD    Physician Orders: eval and treat  Medical Diagnosis: Rupture of ulnar collateral ligament of right thumb, initial encounter [S63.641A]  Surgical Procedure and Date: 4/28/23, Right thumb ulnar collateral ligament repair with internal brace    Date of Injury/Onset: 3/4/23  Evaluation Date: 5/16/23  Insurance Authorization Period Expiration: 4/30/23  Plan of Care Expiration: 7/28/23  Date of Return to MD: 6/5/23  Visit # / Visits authorized: 1 / 1  FOTO: 5/16/23  69% limitation    Precautions:  Standard and Weightbearing, thumb UCL repair precautions, no lateral stress to thumb.    Time In: 11:05  Time Out: 11:55  Total Appointment Time (timed & untimed codes): 50 minutes      SUBJECTIVE         History of Current Condition/Mechanism of Injury: Brent reports: she does not feel she needs or wants the custom orthosis today. She has a thumb spica brace on today that she reports is comfortable. Discussed bringing in prior custom orthosis to check fit. She reports she has been using her hand lightly since surgery, but not doing pinching. She also reports that soon a couple days after surgery she punched something with bother of her hands, but does not feel she hit her thumb.     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     Current Functional Status   Home/Living environment:     lives with their family, but her significant other is about to go off shore again soon. (Has a 15 y/o and a 7 y/o)   Limitation of Functional Status as follows:   ADLs/IADLs:     - Feeding: I    - Bathing: mod I    - Dressing/Grooming: mod I    - Driving: I      -has been cooking     Leisure: did not report    Pain:  Functional Pain Scale Rating 0-10: Current 4/10, worst did not rate, best 4/10   Location: right thumb UCL  Description: sore, sensitive  Aggravating Factors: movement or if hits it  Easing Factors: rest, wearing brace     Patient's Goals for Therapy: to be able to use it, for usage to go back to normal    Medical History:   Past Medical History:   Diagnosis Date    Fibroids     Hypertension        Surgical History:    has a past surgical history that includes Marlin tooth extraction (Bilateral, 2011) and Repair of collateral ligament of thumb (Right, 4/28/2023).    Medications:   has a current medication list which includes the following prescription(s): acetaminophen, alprazolam, amlodipine, bupropion, cetirizine, clonidine, hydrocodone-acetaminophen, ibuprofen, and metoprolol succinate.    Allergies:   Review of patient's allergies indicates:  No Known Allergies       OBJECTIVE     Observation/Appearance: 4 cm incision at thumb UCL. Healing incision, clean. Moderate swelling noted in her R thumb.       Edema. Measured in centimeters.   5/17/2023 5/17/2023    Right Left   Thumb:     MP 8.5 7.5   Prox. Phalanx 6.9 6.4   IP 7.6 6.5   Distal Phalanx 6.1 5.8     Elbow and Wrist ROM. Measured in degrees.   5/17/2023 5/17/2023    Left Right   Elbow Ext/Flex     Supination/Pronation     Wrist Ext/Flex WFL WFL   Wrist RD/UD       Hand ROM. Measured in degrees.  Digits 2-5 WNL   5/17/2023 5/17/2023     Right Left             Thumb: MP 12/30 55                IP 17 52       Rad ADD/ABD 50 58       Pal ADD/ABD 45 50          Opposition To tip of SF WNL      Strength (Dynamometer) and Pinch Strength (Pinch Gauge)  Measured in pounds.   5/17/2023 5/17/2023    Left Right   Rung II deferred deferred   Key Pinch     3pt Pinch     2pt Pinch       Sensation: reports her lateral thumb is numb. Reports sensitivity to dorsal thumb near incision          Treatment   Total Treatment time (time-based codes) separate from Evaluation: 16 minutes    Brent received the treatments listed below:     Supervised modalities after being cleared for contradictions: Hot Pack -   Patient received MH x 8 min to R hand to increase blood flow, circulation and tissue elasticity prior to therex    Therapeutic exercises to develop ROM and flexibility for 8 minutes, including:    AROM Wrist  Ext/flx  RD/UD   X 10 reps each    AROM thumb:  Palm abd/add  Rad abd/add  IP blocks  Opposition  Composite flx     X 10 reps each (mid range, not forced)     **moderate cuing required for redirection to task    Patient Education and Home Exercises      Education provided:   - educated on orthosis wear, precautions, activity modifications, and HEP as above    Written Home Exercises Provided: yes.  Exercises were reviewed and Brent was able to demonstrate them prior to the end of the session.  Brent demonstrated good  understanding of the education provided. See EMR under Patient Instructions for exercises provided during therapy sessions.     Pt was advised to perform these exercises free of pain, and to stop performing them if pain occurs.    Patient/Family Education: role of OT, goals for OT, scheduling/cancellations - pt verbalized understanding. Discussed insurance limitations with patient.    ASSESSMENT     Gina Dickson is a 37 y.o. female referred to outpatient occupational therapy and presents with a medical diagnosis of s/p right thumb UCL  repair.  Patient presents with the following therapy deficits: Decreased ROM, Decreased  strength, Decreased pinch strength, Decreased muscle strength, Decreased functional hand use, Increased pain, Edema, Joint Stiffness, Scar Adhesions, and Diminished/Impaired Sensation and demonstrates limitations as described in the chart below. Following medical record review it is determined that pt will benefit from occupational therapy services in order to maximize pain free and/or functional use of right hand. The following goals were discussed with the patient and patient is in agreement with them as to be addressed in the treatment plan. The patient's rehab potential is Good.     Anticipated barriers to occupational therapy: depression/grief, difficulty attending to task  Pt has no cultural, educational or language barriers to learning provided.    Profile and History Assessment of Occupational Performance Level of Clinical Decision Making Complexity Score   Occupational Profile:   Gina Dickson is a 37 y.o. female who lives with their family and is currently employed Gina Dickson has difficulty with  ADLs and IADLs as listed previously, which  Affecting herdaily functional abilities.      Comorbidities:    has a past medical history of Fibroids and Hypertension.    Medical and Therapy History Review:   Expanded               Performance Deficits    Physical:  Joint Mobility  Joint Stability  Muscle Power/Strength  Muscle Endurance  Skin Integrity/Scar Formation  Edema   Strength  Pinch Strength  Fine Motor Coordination  Pain    Cognitive:  Attention    Psychosocial:    Social Interaction  Habits  Routines     Clinical Decision Making:  moderate    Assessment Process:  Detailed Assessments    Modification/Need for Assistance:  Minimal-Moderate Modifications/Assistance    Intervention Selection:  Multiple Treatment Options       moderate  Based on PMHX, co morbidities , data from assessments and  functional level of assistance required with task and clinical presentation directly impacting function.         Goals:   The following goals were discussed with the patient and patient is in agreement with them as to be addressed in the treatment plan.   Long Term Goals (LTGs); to be met by discharge.  1) Pt will report pain no higher than 1/10 with ADLs, typing, and house hold tasks  2) Pt will have an improved FOTO score by at least 20 points  3) Pt will report return to prior level of function.   4) Pt will demonstrate improved R thumb AROM WFL for grasping and holding things  5)  and pinch strength to be assessed when appropriate, set goals    Short Term Goals (STGs); to be met within 4 weeks (6/16/23).  1) Pt will report pain no higher than 4/10 with ADLs and  duties  2) Pt will report or demonstrate independence with HEP, orthosis wear, and precautions  3) Pt will demonstrate improved right thumb AROM by at least 15 degrees for improved function  4) Pt will demonstrate improved right thumb edema by at least 0.3 cm for increased motion and function    PLAN   Plan of Care Certification: 5/16/2023 to 7/28/23.     Outpatient Occupational Therapy 2-3 times weekly for 10 weeks to include the following interventions: Paraffin, Fluidotherapy, Manual therapy/joint mobilizations, Modalities for pain management, US 3 mhz, Therapeutic exercises/activities., Strengthening, Orthotic Fabrication/Fit/Training, Edema Control, Scar Management, and Wound Care. Pt reports that she is unable to do 3x/week and will try 2x/week at this time. Discussed scheduling out, however pt only scheduled appointments for next week at this time.       RIYA Rockwell, T      I CERTIFY THE NEED FOR THESE SERVICES FURNISHED UNDER THIS PLAN OF TREATMENT AND WHILE UNDER MY CARE  Physician's comments:      Physician's Signature: ___________________________________________________

## 2023-05-16 NOTE — PATIENT INSTRUCTIONS
"      [facility], OCCUPATIONAL THERAPY  HOME EXERCISE PROGRAM               Complete massage 2-3 minutes 2-3 times a day:      Gently massage scar        Complete 10 repetitions of each exercise 4-6 times a day:                AROM: Thumb Composite Flexion   Bend both joints of thumb mid way towards palm, as able.                                                     AROM: Opposition   Touch tip of thumb to nail tip of each finger in turn, making an "O" shape.                 _    "

## 2023-05-17 PROBLEM — M79.644 PAIN OF RIGHT THUMB: Status: RESOLVED | Noted: 2023-03-16 | Resolved: 2023-05-17

## 2023-05-17 PROBLEM — M79.644 THUMB PAIN, RIGHT: Status: ACTIVE | Noted: 2023-05-17

## 2023-05-17 PROBLEM — M79.89 SWELLING OF RIGHT THUMB: Status: ACTIVE | Noted: 2023-05-17

## 2023-05-17 PROBLEM — M25.60 RANGE OF MOTION DEFICIT: Status: ACTIVE | Noted: 2023-05-17

## 2023-05-17 PROBLEM — M25.641 DECREASED RANGE OF MOTION OF RIGHT THUMB: Status: RESOLVED | Noted: 2023-03-16 | Resolved: 2023-05-17

## 2023-05-22 ENCOUNTER — PATIENT OUTREACH (OUTPATIENT)
Dept: ADMINISTRATIVE | Facility: OTHER | Age: 38
End: 2023-05-22
Payer: COMMERCIAL

## 2023-05-22 ENCOUNTER — PATIENT MESSAGE (OUTPATIENT)
Dept: PRIMARY CARE CLINIC | Facility: CLINIC | Age: 38
End: 2023-05-22
Payer: COMMERCIAL

## 2023-05-22 NOTE — PROGRESS NOTES
CHW - Follow Up    This Community Health Worker completed a follow up visit with patient via telephone today.  Pt/Caregiver reported: patient stated that she did receive a call yet form anyone of the referrals the chw put in   Community Health Worker provided: chw will follow up with the referral and see why no one reach out to patient yet  Follow up required:   Follow-up Outreach - Due: 6/15/2023

## 2023-05-24 ENCOUNTER — CLINICAL SUPPORT (OUTPATIENT)
Dept: REHABILITATION | Facility: HOSPITAL | Age: 38
End: 2023-05-24
Payer: COMMERCIAL

## 2023-05-24 DIAGNOSIS — M79.89 SWELLING OF RIGHT THUMB: ICD-10-CM

## 2023-05-24 DIAGNOSIS — M25.60 RANGE OF MOTION DEFICIT: ICD-10-CM

## 2023-05-24 DIAGNOSIS — M79.644 THUMB PAIN, RIGHT: Primary | ICD-10-CM

## 2023-05-24 PROCEDURE — 97022 WHIRLPOOL THERAPY: CPT | Mod: CO

## 2023-05-24 PROCEDURE — 97110 THERAPEUTIC EXERCISES: CPT | Mod: CO

## 2023-05-24 PROCEDURE — 97140 MANUAL THERAPY 1/> REGIONS: CPT | Mod: CO

## 2023-05-24 NOTE — PROGRESS NOTES
"  [facility]  Occupational Therapy Treatment Note    Date: 5/24/2023  Name: Gina Dickson  Clinic Number: [id number]    Therapy Diagnosis: No diagnosis found.  Physician: Russo-Digeorge, Jamie L*    Physician Orders: eval and treat  Medical Diagnosis: Rupture of ulnar collateral ligament of right thumb, initial encounter [S63.641A]  Surgical Procedure and Date: 4/28/23, Right thumb ulnar collateral ligament repair with internal brace    Date of Injury/Onset: 3/4/23  Evaluation Date: 5/16/23  Insurance Authorization Period Expiration: 4/30/23  Plan of Care Expiration: 7/28/23  Date of Return to MD: 6/5/23  Visit # / Visits authorized: 3 / 10  FOTO: 5/16/23  69% limitation     Precautions:  Standard and Weightbearing, thumb UCL repair precautions, no lateral stress to thumb.     Time In: 1:28 pm  Time Out: 2:21 pm  Total Appointment Time (timed & untimed codes): 53 minutes         SUBJECTIVE     Pt reports: "I think there's something wrong with the top of my thumb."  She was compliant with home exercise program given last session.   Response to previous treatment:first tx   Functional change: wearing soft brace 2* discomfort of custom orthosis    Pain: 5/10  Location: right thumb    OBJECTIVE   Objective Measures updated at progress report unless specified.    Observation/Appearance: 4 cm incision at thumb UCL. Healing incision, clean. Moderate swelling noted in her R thumb.         Edema. Measured in centimeters.    5/17/2023 5/17/2023     Right Left   Thumb:       MP 8.5 7.5   Prox. Phalanx 6.9 6.4   IP 7.6 6.5   Distal Phalanx 6.1 5.8      Elbow and Wrist ROM. Measured in degrees.    5/17/2023 5/17/2023     Left Right   Elbow Ext/Flex       Supination/Pronation       Wrist Ext/Flex WFL WFL   Wrist RD/UD          Hand ROM. Measured in degrees.  Digits 2-5 WNL    5/17/2023 5/17/2023     Right Left                   Thumb: MP 12/30 55                IP 17 52       Rad ADD/ABD 50 58      "  Pal ADD/ABD 45 50          Opposition To tip of SF WNL       Strength (Dynamometer) and Pinch Strength (Pinch Gauge)  Measured in pounds.    5/17/2023 5/17/2023     Left Right   Rung II deferred deferred   Key Pinch       3pt Pinch       2pt Pinch          Sensation: reports her lateral thumb is numb. Reports sensitivity to dorsal thumb near incision         Treatment     Brent received the treatments listed below:     Supervised modalities after being cleared for contradictions: Fluidotherapy - Fluidotherapy: To R hand for 10 min, continuous air, 110 deg, air speed 100 to decrease pain, edema & scar tissue and increased tissue extensibility.    Manual therapy techniques: Manual Lymphatic Drainage, Soft tissue Mobilization, and PROM were applied to the: R thumb for 20 minutes, including:  -Performed Retrograde massage to decrease edema, improve joint mobility, decrease pain and improve lymphatic drainage to increase hand function.   -Performed scar massage to sx site to decrease adhesions and improve tensile glide.   - very gentle PROM to thumb IP  - vibratory massage to distal thumb  - trimmed emerging residual stitch     Therapeutic exercises to develop strength, endurance, and ROM for 18 minutes, including:     AROM Wrist  Ext/flx  RD/UD    X 10 reps each    AROM thumb:  Palm abd/add  Rad abd/add  IP blocks  Opposition  Composite flx       X 10 reps each (mid range, not forced)   Thumb spool on IF  X10 ea way      Adjustments to orthosis for comfort          Patient Education and Home Exercises      Education provided:   - HEP  - f/u with MD 2* IP pain  - Progress towards goals     Written Home Exercises Provided: Patient instructed to cont prior HEP.  Exercises were reviewed and Brent was able to demonstrate them prior to the end of the session.  Brent demonstrated good  understanding of the HEP provided. See EMR under Patient Instructions for exercises provided during therapy sessions.      ASSESSMENT       Pt verbalized that she has tenderness near her incision, numbness distal to the incision and pain with ROM of her IP. She participated well however tolerance to tx was limited and breaks were taken for pain. Pt will continue therapy at a location closer to her home. Ed on contacting MD 2* incr pain in IP joint.      Brent is progressing well towards her goals and there are no updates to goals at this time. Pt prognosis is Good.     Pt will continue to benefit from skilled outpatient occupational therapy to address the deficits listed in the problem list on initial evaluation, provide pt/family education and to maximize pt's level of independence in the home and community environment.     Pt's spiritual, cultural and educational needs considered and pt agreeable to plan of care and goals.    Anticipated barriers to occupational therapy: depression/grief, difficulty attending to task     Goals:  Long Term Goals (LTGs); to be met by discharge.  1) Pt will report pain no higher than 1/10 with ADLs, typing, and house hold tasks  2) Pt will have an improved FOTO score by at least 20 points  3) Pt will report return to prior level of function.   4) Pt will demonstrate improved R thumb AROM WFL for grasping and holding things  5)  and pinch strength to be assessed when appropriate, set goals     Short Term Goals (STGs); to be met within 4 weeks (6/16/23).  1) Pt will report pain no higher than 4/10 with ADLs and  duties  2) Pt will report or demonstrate independence with HEP, orthosis wear, and precautions  3) Pt will demonstrate improved right thumb AROM by at least 15 degrees for improved function  4) Pt will demonstrate improved right thumb edema by at least 0.3 cm for increased motion and function    PLAN     Continue skilled occupational therapy with individualized plan of care focusing on increasing ROM and strength to return to PLOF    Updates/Grading for next session: continue with  protocol    AURA Robertson/ALCIDES

## 2023-05-30 ENCOUNTER — PATIENT MESSAGE (OUTPATIENT)
Dept: ORTHOPEDICS | Facility: CLINIC | Age: 38
End: 2023-05-30
Payer: COMMERCIAL

## 2023-05-30 ENCOUNTER — PATIENT MESSAGE (OUTPATIENT)
Dept: REHABILITATION | Facility: HOSPITAL | Age: 38
End: 2023-05-30
Payer: COMMERCIAL

## 2023-06-02 ENCOUNTER — PATIENT MESSAGE (OUTPATIENT)
Dept: ORTHOPEDICS | Facility: CLINIC | Age: 38
End: 2023-06-02
Payer: COMMERCIAL

## 2023-06-02 DIAGNOSIS — M79.641 RIGHT HAND PAIN: Primary | ICD-10-CM

## 2023-06-05 ENCOUNTER — HOSPITAL ENCOUNTER (OUTPATIENT)
Dept: RADIOLOGY | Facility: OTHER | Age: 38
Discharge: HOME | End: 2023-06-05
Attending: ORTHOPAEDIC SURGERY
Payer: COMMERCIAL

## 2023-06-05 ENCOUNTER — OFFICE VISIT (OUTPATIENT)
Dept: ORTHOPEDICS | Facility: CLINIC | Age: 38
End: 2023-06-05
Payer: COMMERCIAL

## 2023-06-05 VITALS — BODY MASS INDEX: 35.33 KG/M2 | WEIGHT: 192 LBS | HEIGHT: 62 IN

## 2023-06-05 DIAGNOSIS — M79.641 RIGHT HAND PAIN: ICD-10-CM

## 2023-06-05 DIAGNOSIS — S63.641A RUPTURE OF ULNAR COLLATERAL LIGAMENT OF RIGHT THUMB, INITIAL ENCOUNTER: Primary | ICD-10-CM

## 2023-06-05 PROCEDURE — 99024 PR POST-OP FOLLOW-UP VISIT: ICD-10-PCS | Mod: S$GLB,,, | Performed by: ORTHOPAEDIC SURGERY

## 2023-06-05 PROCEDURE — 73130 X-RAY EXAM OF HAND: CPT | Mod: 26,RT,, | Performed by: RADIOLOGY

## 2023-06-05 PROCEDURE — 3008F BODY MASS INDEX DOCD: CPT | Mod: CPTII,S$GLB,, | Performed by: ORTHOPAEDIC SURGERY

## 2023-06-05 PROCEDURE — 1159F MED LIST DOCD IN RCRD: CPT | Mod: CPTII,S$GLB,, | Performed by: ORTHOPAEDIC SURGERY

## 2023-06-05 PROCEDURE — 73130 X-RAY EXAM OF HAND: CPT | Mod: TC,FY,RT

## 2023-06-05 PROCEDURE — 3008F PR BODY MASS INDEX (BMI) DOCUMENTED: ICD-10-PCS | Mod: CPTII,S$GLB,, | Performed by: ORTHOPAEDIC SURGERY

## 2023-06-05 PROCEDURE — 99024 POSTOP FOLLOW-UP VISIT: CPT | Mod: S$GLB,,, | Performed by: ORTHOPAEDIC SURGERY

## 2023-06-05 PROCEDURE — 99999 PR PBB SHADOW E&M-EST. PATIENT-LVL III: ICD-10-PCS | Mod: PBBFAC,,, | Performed by: ORTHOPAEDIC SURGERY

## 2023-06-05 PROCEDURE — 1159F PR MEDICATION LIST DOCUMENTED IN MEDICAL RECORD: ICD-10-PCS | Mod: CPTII,S$GLB,, | Performed by: ORTHOPAEDIC SURGERY

## 2023-06-05 PROCEDURE — 73130 XR HAND COMPLETE 3 VIEW RIGHT: ICD-10-PCS | Mod: 26,RT,, | Performed by: RADIOLOGY

## 2023-06-05 PROCEDURE — 99999 PR PBB SHADOW E&M-EST. PATIENT-LVL III: CPT | Mod: PBBFAC,,, | Performed by: ORTHOPAEDIC SURGERY

## 2023-06-05 NOTE — PROGRESS NOTES
Gina Dickson presents for post-operative evaluation.  The patient is now 6 weeks s/p Right thumb UCL repair with internal brace with Dr. Elias on 4/28/23.  She is doing really well from her surgery.  She notes that she was having some stiffness in the thumb secondary to a trigger thumb preoperatively which has continued to be somewhat of a problem.  As far as the surgery goes she is recovered and doing very well from that with no complaints.  She does still note pain at the right thumb A1 pulley and otherwise is without complaint.    PE:    AA&O x 4.  NAD  HEENT:  NCAT, sclera nonicteric  Lungs:  Respirations are equal and unlabored.  CV:  2+ bilateral upper and lower extremity pulses.  MSK: The wound is healing well with no signs of erythema or warmth.  There is no drainage.  No clinical signs or symptoms of infection are present.  Patient with nearly full painless range of motion other than her trigger thumb limitations.  Tenderness to palpation at the right thumb A1 pulley.  Good stability.  Right upper extremity neurovascular intact.        A/P: Status post above, doing well  1) at this point in time the patient may wean from her splint as tolerated.  No restrictions.  Activities as tolerated.  She requests to transition her therapy to [facility] as I would be more convenient for her.  Given how well she is doing I am comfortable that with that even if that means she needs to do physical therapy as opposed occupational therapy.  Follow-up with me in 6 weeks for re-evaluation.  We discussed that we can always address her trigger thumb with injections or surgery but for now we will continue to follow focus on her surgical recovery.

## 2023-06-06 ENCOUNTER — PATIENT MESSAGE (OUTPATIENT)
Dept: PRIMARY CARE CLINIC | Facility: CLINIC | Age: 38
End: 2023-06-06
Payer: COMMERCIAL

## 2023-06-07 ENCOUNTER — OFFICE VISIT (OUTPATIENT)
Dept: PSYCHIATRY | Facility: CLINIC | Age: 38
End: 2023-06-07
Payer: COMMERCIAL

## 2023-06-07 VITALS
WEIGHT: 202.63 LBS | BODY MASS INDEX: 37.06 KG/M2 | DIASTOLIC BLOOD PRESSURE: 96 MMHG | HEART RATE: 73 BPM | SYSTOLIC BLOOD PRESSURE: 155 MMHG

## 2023-06-07 DIAGNOSIS — F41.9 ANXIETY DISORDER, UNSPECIFIED TYPE: ICD-10-CM

## 2023-06-07 DIAGNOSIS — F33.1 DEPRESSION, MAJOR, RECURRENT, MODERATE: Primary | ICD-10-CM

## 2023-06-07 DIAGNOSIS — F41.0 PANIC ATTACKS: ICD-10-CM

## 2023-06-07 DIAGNOSIS — F43.21 GRIEF: ICD-10-CM

## 2023-06-07 PROCEDURE — 3080F PR MOST RECENT DIASTOLIC BLOOD PRESSURE >= 90 MM HG: ICD-10-PCS | Mod: CPTII,S$GLB,, | Performed by: PSYCHIATRY & NEUROLOGY

## 2023-06-07 PROCEDURE — 3008F BODY MASS INDEX DOCD: CPT | Mod: CPTII,S$GLB,, | Performed by: PSYCHIATRY & NEUROLOGY

## 2023-06-07 PROCEDURE — 3008F PR BODY MASS INDEX (BMI) DOCUMENTED: ICD-10-PCS | Mod: CPTII,S$GLB,, | Performed by: PSYCHIATRY & NEUROLOGY

## 2023-06-07 PROCEDURE — 3077F SYST BP >= 140 MM HG: CPT | Mod: CPTII,S$GLB,, | Performed by: PSYCHIATRY & NEUROLOGY

## 2023-06-07 PROCEDURE — 99999 PR PBB SHADOW E&M-EST. PATIENT-LVL II: ICD-10-PCS | Mod: PBBFAC,,, | Performed by: PSYCHIATRY & NEUROLOGY

## 2023-06-07 PROCEDURE — 3077F PR MOST RECENT SYSTOLIC BLOOD PRESSURE >= 140 MM HG: ICD-10-PCS | Mod: CPTII,S$GLB,, | Performed by: PSYCHIATRY & NEUROLOGY

## 2023-06-07 PROCEDURE — 99214 PR OFFICE/OUTPT VISIT, EST, LEVL IV, 30-39 MIN: ICD-10-PCS | Mod: S$GLB,,, | Performed by: PSYCHIATRY & NEUROLOGY

## 2023-06-07 PROCEDURE — 99214 OFFICE O/P EST MOD 30 MIN: CPT | Mod: S$GLB,,, | Performed by: PSYCHIATRY & NEUROLOGY

## 2023-06-07 PROCEDURE — 99999 PR PBB SHADOW E&M-EST. PATIENT-LVL II: CPT | Mod: PBBFAC,,, | Performed by: PSYCHIATRY & NEUROLOGY

## 2023-06-07 PROCEDURE — 3080F DIAST BP >= 90 MM HG: CPT | Mod: CPTII,S$GLB,, | Performed by: PSYCHIATRY & NEUROLOGY

## 2023-06-07 RX ORDER — ALPRAZOLAM 0.5 MG/1
.25-.5 TABLET ORAL 2 TIMES DAILY PRN
Qty: 60 TABLET | Refills: 0 | Status: SHIPPED | OUTPATIENT
Start: 2023-06-07 | End: 2023-08-15 | Stop reason: SDUPTHER

## 2023-06-07 RX ORDER — BUPROPION HYDROCHLORIDE 300 MG/1
300 TABLET ORAL DAILY
Qty: 90 TABLET | Refills: 0 | Status: SHIPPED | OUTPATIENT
Start: 2023-06-07 | End: 2023-08-15 | Stop reason: SDUPTHER

## 2023-06-07 NOTE — PROGRESS NOTES
"Gina Dickson   1985       Disclaimer: Evaluation and treatment is based on information presented to date. Any new information may affect assessment and findings.     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 / fluffy poodles or some similar anme    Likes advance of Wellbutrin XL from 150 mg to 300 mg  / also takes some intermittent Xanax 0.5 mg BID prn signif anxiety ; has some intermittent panic attack    On 23 was  crying explaining now mother of ( brother's child) turns up dead  / possible suicide     Has not yet established with  counselor / says friend of hers is a lead mngr at [facility] who said she would also help her to get in to counselor / tho also was told to [facility] [phone]    Does say that has numerous family friend boyfriend who helping her cope      Did (again) reminded her of IOP  Intensive Outpatient Program (IOP) / says ont interested / says doing better    Says thinking of going back to work     At length corky discussed therpay principles grief coping skills ; have  gave info www.griefshare.org     Last session gave info on journalling.    Constitutional Health Concerns: had surgery recent  for Rupture of ulnar collateral ligament of right thumb    Was rx pain med for such / tho taking  only rarely       Wt Readings from Last 3 Encounters:   23 91.9 kg (202 lb 9.6 oz)   23 87.1 kg (192 lb)   05/10/23 87.3 kg (192 lb 7.4 oz)   ]     Laboratory Data  No visits with results within 1 Month(s) from this visit.   Latest known visit with results is:   Admission on 2023, Discharged on 2023   Component Date Value " Ref Range Status    WBC 04/30/2023 8.60  3.90 - 12.70 K/uL Final    RBC 04/30/2023 5.10  4.00 - 5.40 M/uL Final    Hemoglobin 04/30/2023 14.3  12.0 - 16.0 g/dL Final    Hematocrit 04/30/2023 40.3  37.0 - 48.5 % Final    MCV 04/30/2023 79 (L)  82 - 98 fL Final    MCH 04/30/2023 28.0  27.0 - 31.0 pg Final    MCHC 04/30/2023 35.5  32.0 - 36.0 g/dL Final    RDW 04/30/2023 14.2  11.5 - 14.5 % Final    Platelets 04/30/2023 211  150 - 450 K/uL Final    MPV 04/30/2023 9.5  9.2 - 12.9 fL Final    Immature Granulocytes 04/30/2023 0.3  0.0 - 0.5 % Final    Gran # (ANC) 04/30/2023 4.9  1.8 - 7.7 K/uL Final    Immature Grans (Abs) 04/30/2023 0.03  0.00 - 0.04 K/uL Final    Lymph # 04/30/2023 3.2  1.0 - 4.8 K/uL Final    Mono # 04/30/2023 0.4  0.3 - 1.0 K/uL Final    Eos # 04/30/2023 0.1  0.0 - 0.5 K/uL Final    Baso # 04/30/2023 0.05  0.00 - 0.20 K/uL Final    nRBC 04/30/2023 0  0 /100 WBC Final    Gran % 04/30/2023 56.7  38.0 - 73.0 % Final    Lymph % 04/30/2023 37.0  18.0 - 48.0 % Final    Mono % 04/30/2023 4.7  4.0 - 15.0 % Final    Eosinophil % 04/30/2023 0.7  0.0 - 8.0 % Final    Basophil % 04/30/2023 0.6  0.0 - 1.9 % Final    Differential Method 04/30/2023 Automated   Final    Sodium 04/30/2023 136  136 - 145 mmol/L Final    Potassium 04/30/2023 4.0  3.5 - 5.1 mmol/L Final    Chloride 04/30/2023 104  95 - 110 mmol/L Final    CO2 04/30/2023 22 (L)  23 - 29 mmol/L Final    Glucose 04/30/2023 89  70 - 110 mg/dL Final    BUN 04/30/2023 13  6 - 20 mg/dL Final    Creatinine 04/30/2023 0.9  0.5 - 1.4 mg/dL Final    Calcium 04/30/2023 9.4  8.7 - 10.5 mg/dL Final    Total Protein 04/30/2023 7.6  6.0 - 8.4 g/dL Final    Albumin 04/30/2023 4.2  3.5 - 5.2 g/dL Final    Total Bilirubin 04/30/2023 0.6  0.1 - 1.0 mg/dL Final    Alkaline Phosphatase 04/30/2023 83  55 - 135 U/L Final    AST 04/30/2023 15  10 - 40 U/L Final    ALT 04/30/2023 12  10 - 44 U/L Final    Anion Gap 04/30/2023 10  8 - 16 mmol/L Final    eGFR 04/30/2023 >60.0  >60  mL/min/1.73 m^2 Final    Troponin I 04/30/2023 <0.006  0.000 - 0.026 ng/mL Final    BNP 04/30/2023 28  0 - 99 pg/mL Final        Mental Status Exam:      Appearance: casual   Oriented: x 3   Attitude: cooperative   Eye Contact: good  Behavior: calm smiling at times     Mood: ok /, better  Cognition: alert  Concentration: grossly intact   Affect: appropriate range      Anxiety: mild / mod      Thought Process: goal directed     Speech:       Volume : WNL       Quantity WNL       Quality: appears to openly answer questions      Threats: no SI / no HI     Psychosis: denies all      Estimate of Intellectual Function: average   Impulse Control: no thoughts of harm to self/ others      Musculoskeletal: Rupture of ulnar collateral ligament of right thumb    Social: has many supports / family friends     Patient Active Problem List   Diagnosis    Uncontrolled hypertension    Depression with anxiety    Severe obesity (BMI 35.0-39.9) with comorbidity    Sickle-cell trait    Anxiety disorder    Depression, major, recurrent, moderateto severe    Grief    Thumb pain, right    Range of motion deficit    Swelling of right thumb          Current Outpatient Medications:     acetaminophen (TYLENOL) 650 MG TbSR, Take 1 tablet (650 mg total) by mouth every 8 (eight) hours., Disp: 90 tablet, Rfl: 0    ALPRAZolam (XANAX) 0.5 MG tablet, Take 0.5-1 tablets (0.25-0.5 mg total) by mouth 2 (two) times daily as needed (SIGNIFICANT ANXIETY)., Disp: 60 tablet, Rfl: 0    amLODIPine (NORVASC) 10 MG tablet, Take 1 tablet (10 mg total) by mouth once daily., Disp: 90 tablet, Rfl: 1    buPROPion (WELLBUTRIN XL) 300 MG 24 hr tablet, Take 1 tablet (300 mg total) by mouth once daily., Disp: 90 tablet, Rfl: 0    cetirizine (ZYRTEC) 10 MG tablet, Take 1 tablet (10 mg total) by mouth every evening. for 10 days, Disp: 10 tablet, Rfl: 0    cloNIDine (CATAPRES) 0.1 MG tablet, Take 1 tablet (0.1 mg total) by mouth once daily., Disp: 60 tablet, Rfl: 1     "HYDROcodone-acetaminophen (NORCO) 7.5-325 mg per tablet, Take 1 tablet by mouth every 6 (six) hours as needed for Pain., Disp: 28 tablet, Rfl: 0    ibuprofen (ADVIL,MOTRIN) 600 MG tablet, Take 1 tablet (600 mg total) by mouth 3 (three) times daily., Disp: 90 tablet, Rfl: 0    metoprolol succinate (TOPROL-XL) 50 MG 24 hr tablet, Take 1 tablet (50 mg total) by mouth once daily., Disp: 90 tablet, Rfl: 0     Social History     Tobacco Use   Smoking Status Former   Smokeless Tobacco Never        Review of patient's allergies indicates:  No Known Allergies     ASSESSMENT:   Encounter Diagnoses   Name Primary?    Depression, major, recurrent, moderateto severe Yes    Grief     Anxiety disorder, unspecified type     Panic attacks        Patient Instructions       PLAN:     FOLLOW UP Aug 20 2023 @ 10:30 am In Clinic         1) Meds renew as prior orning / renew  xanax 0.5 mg f twice a day and reminded of as needed signif anxiety not per se / rolling daily use    2) She was again offered Intensive Outpatient Program (IOP)  politely declines    3)  Reminded her to call [phone] and follow up to [facility] scheduled  [facility] for  Intake. IF challenges to get in timely ; as alternate: google "[facility]  and Behavioral Health  to explore options [email or web address]/    At length reviewed discussed therpay principles grief coping skills and gave info www.griefshare.org     References:     Relaxation stress reduction workbook: ANA Mandujano PhD ( used: $7-10)    Feeling Good Website: Francisco Javier Juarez MD / www.feelingBluegape Lifestyle.com website (free) / jerry. PODCASTS    Anxiety &  phobia workbook by ALEXEI Seth PhD  (web retailers: used: $ 7-10)    [place]: Path to Better Sleep : https://www.veterantraining.[place].gov/insomnia/ (free)       Pt expressed appreciation for the visit today and did not have further question at this time though pt  was still informed to:     Call  if problems.    Call / Report Side " Effects to Psyc MD     Encouraged to follow up with primary care / Gen Med MD for continued monitoring of general health and wellness.  Y aug 30  2023

## 2023-06-07 NOTE — PATIENT INSTRUCTIONS
"    PLAN:     FOLLOW UP Aug 20 2023 @ 10:30 am In Clinic         1) Meds renew as prior orning / renew  xanax 0.5 mg f twice a day and reminded of as needed signif anxiety not per se / rolling daily use    2) She was again offered Intensive Outpatient Program (IOP)  politely declines    3)  Reminded her to call [phone] and follow up to [facility] scheduled  [facility] for  Intake. IF challenges to get in timely ; as alternate: google "[facility]  and Behavioral Health  to explore options [email or web address]    At length reviewed discussed therpay principles grief coping skills and gave info www.griefshare.org     References:     Relaxation stress reduction workbook: ANA Mandujano PhD ( used: $7-10)    Feeling Good Website: Francisco Javier Juarez MD / www.[employer] website (free) / jerry. PODCASTS    Anxiety &  phobia workbook by ALEXEI Seth PhD  (web retailers: used: $ 7-10)    [place]: Path to Better Sleep : https://www.veterantraining.[place].gov/insomnia/ (free)       Pt expressed appreciation for the visit today and did not have further question at this time though pt  was still informed to:     Call  if problems.    Call / Report Side Effects to Psmalik ROSSI     Encouraged to follow up with primary care / Gen Med MD for continued monitoring of general health and wellness.  Y aug 30  2023   "

## 2023-06-09 ENCOUNTER — PATIENT MESSAGE (OUTPATIENT)
Dept: PSYCHIATRY | Facility: CLINIC | Age: 38
End: 2023-06-09
Payer: COMMERCIAL

## 2023-06-12 ENCOUNTER — PATIENT MESSAGE (OUTPATIENT)
Dept: PSYCHIATRY | Facility: CLINIC | Age: 38
End: 2023-06-12
Payer: COMMERCIAL

## 2023-06-13 ENCOUNTER — PATIENT MESSAGE (OUTPATIENT)
Dept: PSYCHIATRY | Facility: CLINIC | Age: 38
End: 2023-06-13
Payer: COMMERCIAL

## 2023-06-15 ENCOUNTER — PATIENT OUTREACH (OUTPATIENT)
Dept: ADMINISTRATIVE | Facility: OTHER | Age: 38
End: 2023-06-15
Payer: COMMERCIAL

## 2023-06-15 NOTE — PROGRESS NOTES
CHW - Follow Up    This Community Health Worker completed a follow up visit with patient via telephone today.  Pt/Caregiver reported: patient stated that she still haven't received a call from any behavioral health places  Community Health Worker provided: chw will try and get a sw involved since this out my line of scope   Follow up required:   Follow-up Outreach - Due: 7/19/2023

## 2023-06-30 ENCOUNTER — TELEPHONE (OUTPATIENT)
Dept: PSYCHIATRY | Facility: HOSPITAL | Age: 38
End: 2023-06-30
Payer: COMMERCIAL

## 2023-06-30 NOTE — TELEPHONE ENCOUNTER
Called   Spoke to Ms Dickson     Told her forms / [facility] [employer] and [employer] F[employer]A done and to be faxed in   She informs was approved til Sept 23 tho still going to fax forms in that support such    Encouraged her to do counseling   Says she has set up with [facility]    Has appt 8-30-23 and told her will plan for session re reassessment mid sept / prob Sept 13 1 pm telehealth    Thanked me for calling    D Clara ROSSI

## 2023-07-03 ENCOUNTER — PATIENT MESSAGE (OUTPATIENT)
Dept: PRIMARY CARE CLINIC | Facility: CLINIC | Age: 38
End: 2023-07-03
Payer: COMMERCIAL

## 2023-07-03 ENCOUNTER — PATIENT MESSAGE (OUTPATIENT)
Dept: ADMINISTRATIVE | Facility: OTHER | Age: 38
End: 2023-07-03
Payer: COMMERCIAL

## 2023-07-03 DIAGNOSIS — Z20.818 EXPOSURE TO STREP THROAT: Primary | ICD-10-CM

## 2023-07-03 RX ORDER — AMOXICILLIN 875 MG/1
875 TABLET, FILM COATED ORAL 2 TIMES DAILY
Qty: 20 TABLET | Refills: 0 | Status: SHIPPED | OUTPATIENT
Start: 2023-07-03 | End: 2023-07-13

## 2023-07-04 ENCOUNTER — DOCUMENTATION ONLY (OUTPATIENT)
Dept: PSYCHIATRY | Facility: HOSPITAL | Age: 38
End: 2023-07-04
Payer: COMMERCIAL

## 2023-07-04 NOTE — PROGRESS NOTES
Forms completed / given to DINA Bansal who I asked to Fax and scan. I also informed / called pt and informed.    D Post MD

## 2023-07-11 ENCOUNTER — PATIENT MESSAGE (OUTPATIENT)
Dept: PSYCHIATRY | Facility: CLINIC | Age: 38
End: 2023-07-11
Payer: COMMERCIAL

## 2023-07-12 ENCOUNTER — DOCUMENTATION ONLY (OUTPATIENT)
Dept: PSYCHIATRY | Facility: HOSPITAL | Age: 38
End: 2023-07-12
Payer: COMMERCIAL

## 2023-07-12 ENCOUNTER — PATIENT MESSAGE (OUTPATIENT)
Dept: PSYCHIATRY | Facility: CLINIC | Age: 38
End: 2023-07-12
Payer: COMMERCIAL

## 2023-07-12 NOTE — PROGRESS NOTES
Hand delivered forms todat 7-1-23  to be completed and faxed to [facility] re claim [id number] group plan [id number]      Such done and will bring to nursing to fax and will tell time sensitive and mssg pt of same.    Will also ask nursing to scan copy yo EPIC EHR    D Post MD

## 2023-07-17 ENCOUNTER — OFFICE VISIT (OUTPATIENT)
Dept: ORTHOPEDICS | Facility: CLINIC | Age: 38
End: 2023-07-17
Payer: COMMERCIAL

## 2023-07-17 VITALS — HEIGHT: 62 IN | WEIGHT: 202 LBS | BODY MASS INDEX: 37.17 KG/M2

## 2023-07-17 DIAGNOSIS — S63.641D RUPTURE OF ULNAR COLLATERAL LIGAMENT OF RIGHT THUMB, SUBSEQUENT ENCOUNTER: Primary | ICD-10-CM

## 2023-07-17 PROCEDURE — 99999 PR PBB SHADOW E&M-EST. PATIENT-LVL III: CPT | Mod: PBBFAC,,, | Performed by: ORTHOPAEDIC SURGERY

## 2023-07-17 PROCEDURE — 3008F BODY MASS INDEX DOCD: CPT | Mod: CPTII,S$GLB,, | Performed by: ORTHOPAEDIC SURGERY

## 2023-07-17 PROCEDURE — 99999 PR PBB SHADOW E&M-EST. PATIENT-LVL III: ICD-10-PCS | Mod: PBBFAC,,, | Performed by: ORTHOPAEDIC SURGERY

## 2023-07-17 PROCEDURE — 99213 OFFICE O/P EST LOW 20 MIN: CPT | Mod: S$GLB,,, | Performed by: ORTHOPAEDIC SURGERY

## 2023-07-17 PROCEDURE — 3008F PR BODY MASS INDEX (BMI) DOCUMENTED: ICD-10-PCS | Mod: CPTII,S$GLB,, | Performed by: ORTHOPAEDIC SURGERY

## 2023-07-17 PROCEDURE — 1159F PR MEDICATION LIST DOCUMENTED IN MEDICAL RECORD: ICD-10-PCS | Mod: CPTII,S$GLB,, | Performed by: ORTHOPAEDIC SURGERY

## 2023-07-17 PROCEDURE — 99213 PR OFFICE/OUTPT VISIT, EST, LEVL III, 20-29 MIN: ICD-10-PCS | Mod: S$GLB,,, | Performed by: ORTHOPAEDIC SURGERY

## 2023-07-17 PROCEDURE — 1159F MED LIST DOCD IN RCRD: CPT | Mod: CPTII,S$GLB,, | Performed by: ORTHOPAEDIC SURGERY

## 2023-07-17 NOTE — PROGRESS NOTES
Gina Dickson presents for post-operative evaluation.  The patient is now 12 weeks s/p Right thumb UCL repair with internal brace with Dr. Elias on 4/28/23.  She is doing great from that.  Her thumb has fully recovered from this procedure.  She is been having some triggering sensations to the right thumb with IP flexion and was found to have some stenosing tenosynovitis/trigger thumb of the right thumb previously.  She notes that the mechanical clicking has stopped but she still has some pain at the A1 pulley.  No other complaints she is happy with her result of the UCL reconstruction.        PE:    AA&O x 4.  NAD  HEENT:  NCAT, sclera nonicteric  Lungs:  Respirations are equal and unlabored.  CV:  2+ bilateral upper and lower extremity pulses.  MSK: The wound is healing well with no signs of erythema or warmth.  There is no drainage.  No clinical signs or symptoms of infection are present.  Patient with full painless range of motion throughout the right hand and thumb.  Excellent stability to stress.  Tenderness to palpation at the right thumb A1 pulley.  Right upper extremity neurovascular intact.        A/P: Status post above, doing well  1) at this time Josue has fully recovered from her surgery.  She is doing great.  She does have a trigger thumb which she would like to observe.  Should she would like to further consider injections or surgery for that she may follow up any time.  Otherwise activities as tolerated without restrictions.

## 2023-07-26 ENCOUNTER — PATIENT OUTREACH (OUTPATIENT)
Dept: ADMINISTRATIVE | Facility: OTHER | Age: 38
End: 2023-07-26
Payer: COMMERCIAL

## 2023-07-26 NOTE — PROGRESS NOTES
CHW - Follow Up    This Community Health Worker completed a follow up visit with patient via telephone today.  Pt/Caregiver reported: patient stated that she is getting the assistance she needs   Community Health Worker provided: chw will follow up in a few weeks   Follow up required:   Follow-up Outreach - Due: 11/15/2023

## 2023-08-03 ENCOUNTER — PATIENT MESSAGE (OUTPATIENT)
Dept: PRIMARY CARE CLINIC | Facility: CLINIC | Age: 38
End: 2023-08-03
Payer: COMMERCIAL

## 2023-08-07 ENCOUNTER — OFFICE VISIT (OUTPATIENT)
Dept: SPORTS MEDICINE | Facility: CLINIC | Age: 38
End: 2023-08-07
Payer: COMMERCIAL

## 2023-08-07 ENCOUNTER — HOSPITAL ENCOUNTER (OUTPATIENT)
Dept: RADIOLOGY | Facility: HOSPITAL | Age: 38
Discharge: HOME | End: 2023-08-07
Attending: ORTHOPAEDIC SURGERY
Payer: COMMERCIAL

## 2023-08-07 VITALS
HEART RATE: 60 BPM | BODY MASS INDEX: 36.19 KG/M2 | SYSTOLIC BLOOD PRESSURE: 154 MMHG | DIASTOLIC BLOOD PRESSURE: 100 MMHG | WEIGHT: 197.88 LBS

## 2023-08-07 DIAGNOSIS — M25.562 LEFT KNEE PAIN, UNSPECIFIED CHRONICITY: ICD-10-CM

## 2023-08-07 DIAGNOSIS — M54.16 LUMBAR RADICULOPATHY: Primary | ICD-10-CM

## 2023-08-07 PROCEDURE — 73564 X-RAY EXAM KNEE 4 OR MORE: CPT | Mod: TC,LT

## 2023-08-07 PROCEDURE — 1159F PR MEDICATION LIST DOCUMENTED IN MEDICAL RECORD: ICD-10-PCS | Mod: CPTII,S$GLB,, | Performed by: ORTHOPAEDIC SURGERY

## 2023-08-07 PROCEDURE — 99999 PR PBB SHADOW E&M-EST. PATIENT-LVL III: CPT | Mod: PBBFAC,,, | Performed by: ORTHOPAEDIC SURGERY

## 2023-08-07 PROCEDURE — 3008F PR BODY MASS INDEX (BMI) DOCUMENTED: ICD-10-PCS | Mod: CPTII,S$GLB,, | Performed by: ORTHOPAEDIC SURGERY

## 2023-08-07 PROCEDURE — 3077F PR MOST RECENT SYSTOLIC BLOOD PRESSURE >= 140 MM HG: ICD-10-PCS | Mod: CPTII,S$GLB,, | Performed by: ORTHOPAEDIC SURGERY

## 2023-08-07 PROCEDURE — 1159F MED LIST DOCD IN RCRD: CPT | Mod: CPTII,S$GLB,, | Performed by: ORTHOPAEDIC SURGERY

## 2023-08-07 PROCEDURE — 73562 X-RAY EXAM OF KNEE 3: CPT | Mod: 26,RT,, | Performed by: RADIOLOGY

## 2023-08-07 PROCEDURE — 3080F PR MOST RECENT DIASTOLIC BLOOD PRESSURE >= 90 MM HG: ICD-10-PCS | Mod: CPTII,S$GLB,, | Performed by: ORTHOPAEDIC SURGERY

## 2023-08-07 PROCEDURE — 99999 PR PBB SHADOW E&M-EST. PATIENT-LVL III: ICD-10-PCS | Mod: PBBFAC,,, | Performed by: ORTHOPAEDIC SURGERY

## 2023-08-07 PROCEDURE — 99203 PR OFFICE/OUTPT VISIT, NEW, LEVL III, 30-44 MIN: ICD-10-PCS | Mod: S$GLB,,, | Performed by: ORTHOPAEDIC SURGERY

## 2023-08-07 PROCEDURE — 73564 XR KNEE ORTHO LEFT WITH FLEXION: ICD-10-PCS | Mod: 26,LT,, | Performed by: RADIOLOGY

## 2023-08-07 PROCEDURE — 3077F SYST BP >= 140 MM HG: CPT | Mod: CPTII,S$GLB,, | Performed by: ORTHOPAEDIC SURGERY

## 2023-08-07 PROCEDURE — 3080F DIAST BP >= 90 MM HG: CPT | Mod: CPTII,S$GLB,, | Performed by: ORTHOPAEDIC SURGERY

## 2023-08-07 PROCEDURE — 73562 XR KNEE ORTHO LEFT WITH FLEXION: ICD-10-PCS | Mod: 26,RT,, | Performed by: RADIOLOGY

## 2023-08-07 PROCEDURE — 73564 X-RAY EXAM KNEE 4 OR MORE: CPT | Mod: 26,LT,, | Performed by: RADIOLOGY

## 2023-08-07 PROCEDURE — 3008F BODY MASS INDEX DOCD: CPT | Mod: CPTII,S$GLB,, | Performed by: ORTHOPAEDIC SURGERY

## 2023-08-07 PROCEDURE — 99203 OFFICE O/P NEW LOW 30 MIN: CPT | Mod: S$GLB,,, | Performed by: ORTHOPAEDIC SURGERY

## 2023-08-07 RX ORDER — METHYLPREDNISOLONE 4 MG/1
TABLET ORAL
Qty: 21 EACH | Refills: 0 | Status: SHIPPED | OUTPATIENT
Start: 2023-08-07 | End: 2023-08-28

## 2023-08-07 NOTE — PROGRESS NOTES
NEW PATIENT    HISTORY OF PRESENT ILLNESS   37 y.o. 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 She does not recall a specific LALO in regards to her knee. She has most pain in the anterior knee. She has a radiating pain from the medial side of her knee to her foot and from the lateral side up to the back. She has pain while sitting and will lean more to her right side.        - mechanical symptoms, - instability    Is affecting ADLs.  Pain is 7/10 at it's worst.        PAST MEDICAL HISTORY    Past Medical History:   Diagnosis Date    Fibroids     Hypertension        PAST SURGICAL HISTORY     Past Surgical History:   Procedure Laterality Date    REPAIR OF COLLATERAL LIGAMENT OF THUMB Right 4/28/2023    Procedure: REPAIR, LIGAMENT, COLLATERAL, THUMB - right thumb UCL with internal brace;  Surgeon: Edvin Elias MD;  Location: [facility];  Service: Orthopedics;  Laterality: Right;    WISDOM TOOTH EXTRACTION Bilateral 2011       FAMILY HISTORY    Family History   Problem Relation Age of Onset    Lupus Mother     Hypertension Father     Cancer Maternal Aunt         colon, breast       SOCIAL HISTORY    Social History     Socioeconomic History    Marital status: Single   Tobacco Use    Smoking status: Former     Current packs/day: 0.00    Smokeless tobacco: Never   Substance and Sexual Activity    Alcohol use: No    Drug use: Yes     Types: Marijuana    Sexual activity: Yes     Partners: Male     Birth control/protection: None     Social Determinants of Health     Financial Resource Strain: Low Risk  (7/26/2023)    Overall Financial Resource Strain (CARDIA)     Difficulty of Paying Living Expenses: Not hard at all   Food Insecurity: No Food Insecurity (7/26/2023)    Hunger Vital Sign     Worried About Running Out  of Food in the Last Year: Never true     Ran Out of Food in the Last Year: Never true   Transportation Needs: No Transportation Needs (7/26/2023)    PRAPARE - Transportation     Lack of Transportation (Medical): No     Lack of Transportation (Non-Medical): No   Physical Activity: Sufficiently Active (7/26/2023)    Exercise Vital Sign     Days of Exercise per Week: 7 days     Minutes of Exercise per Session: 60 min   Stress: Stress Concern Present (7/26/2023)    [place] [employer] - Occupational Stress Questionnaire     Feeling of Stress : Very much   Social Connections: Moderately Isolated (7/26/2023)    Social Connection and Isolation Panel [NHANES]     Frequency of Communication with Friends and Family: More than three times a week     Frequency of Social Gatherings with Friends and Family: More than three times a week     Attends Buddhism Services: 1 to 4 times per year     Active Member of Clubs or Organizations: No     Attends Club or Organization Meetings: Never     Marital Status: Never    Housing Stability: Low Risk  (7/26/2023)    Housing Stability Vital Sign     Unable to Pay for Housing in the Last Year: No     Number of Places Lived in the Last Year: 1     Unstable Housing in the Last Year: No       MEDICATIONS      Current Outpatient Medications:     acetaminophen (TYLENOL) 650 MG TbSR, Take 1 tablet (650 mg total) by mouth every 8 (eight) hours., Disp: 90 tablet, Rfl: 0    ALPRAZolam (XANAX) 0.5 MG tablet, Take 0.5-1 tablets (0.25-0.5 mg total) by mouth 2 (two) times daily as needed (SIGNIFICANT ANXIETY)., Disp: 60 tablet, Rfl: 0    amLODIPine (NORVASC) 10 MG tablet, Take 1 tablet (10 mg total) by mouth once daily., Disp: 90 tablet, Rfl: 1    buPROPion (WELLBUTRIN XL) 300 MG 24 hr tablet, Take 1 tablet (300 mg total) by mouth once daily., Disp: 90 tablet, Rfl: 0    butalbital-acetaminophen-caffeine -40 mg (FIORICET, ESGIC) -40 mg per tablet, Take 1 tablet by  mouth every 6 (six) hours as needed. Label with sedative precautions, Disp: 20 tablet, Rfl: 0    cetirizine (ZYRTEC) 10 MG tablet, Take 1 tablet (10 mg total) by mouth every evening. for 10 days, Disp: 10 tablet, Rfl: 0    cloNIDine (CATAPRES) 0.1 MG tablet, Take 1 tablet (0.1 mg total) by mouth once daily., Disp: 60 tablet, Rfl: 1    ibuprofen (ADVIL,MOTRIN) 600 MG tablet, Take 1 tablet (600 mg total) by mouth 3 (three) times daily., Disp: 90 tablet, Rfl: 0    metoprolol succinate (TOPROL-XL) 50 MG 24 hr tablet, Take 1 tablet (50 mg total) by mouth once daily., Disp: 90 tablet, Rfl: 0    ALLERGIES     Review of patient's allergies indicates:  No Known Allergies      REVIEW OF SYSTEMS   Constitution: Negative. Negative for chills, fever and night sweats.   HENT: Negative for congestion and headaches.    Eyes: Negative for blurred vision, left vision loss and right vision loss.   Cardiovascular: Negative for chest pain and syncope.   Respiratory: Negative for cough and shortness of breath.    Endocrine: Negative for polydipsia, polyphagia and polyuria.   Hematologic/Lymphatic: Negative for bleeding problem. Does not bruise/bleed easily.   Skin: Negative for dry skin, itching and rash.   Musculoskeletal: Negative for falls. Positive for left knee pain  Gastrointestinal: Negative for abdominal pain and bowel incontinence.   Genitourinary: Negative for bladder incontinence and nocturia.   Neurological: Negative for disturbances in coordination, loss of balance and seizures.   Psychiatric/Behavioral: Negative for depression. The patient does not have insomnia.    Allergic/Immunologic: Negative for hives and persistent infections.     PHYSICAL EXAMINATION    Vitals: BP (!) 154/100   Pulse 60   Wt 89.8 kg (197 lb 13.8 oz)   LMP 07/31/2023 (Exact Date)   BMI 36.19 kg/m²     General: The patient appears active and healthy with no apparent physical problems.  No disturbance of mood or affect is demonstrated. Alert and  Oriented.    HEENT: Eyes normal, pupils equally round, nose normal.    Resp: Equal and symmetrical chest rises. No wheezing    CV: Regular rate    Neck: Supple; nonpainful range of motion.    Extremities: no cyanosis, clubbing, edema, or diffuse swelling.  Palpable pulses, good capillary refill of the digits.  No coolness, discoloration, edema or obvious varicosities.    Skin: no lesions noted.    Lymphatic: no detected adenopathy in the upper or lower extremities.    Neurologic: normal mental status, normal reflexes, normal gait and balance.  Patient is alert and oriented to person, place and time.  No flaccidity or spasticity is noted.  No motor or sensory deficits are noted.  Light touch is intact    Orthopaedic:     KNEE EXAM - LEFT    Inspection:   Normal skin color and appearance with no scars, no ecchymosis and no effusion.      ROM:   0° - 145°.      Palpation:   There is no tenderness along medial joint line, medial plica, medial collateral ligament, pes bursa, lateral joint line, iliotibial band, lateral collateral ligament, popliteal fossa, patellar tendon, or quadriceps tendon.    Stability: - anterior drawer, - Lachman, - pivot shift and - posterior drawer.      No instability with varus or valgus stress at 0° or 30°. Negative dial  test at 30° and 90°.    Tests:   - Edenilsons test.  - patellar compression - grind test, - crepitus.  There is no patellar apprehension.     - J Sign. - Debbie's. - patellar tilt. - Ally. Lateral patella translation  2 quadrants. Medial patella translation 2 quadrants    Motor:   Quadriceps strength is 5/5 and hamstrings strength is 5/5. Hamstrings show no tightness.      Neuro:   Distal neurovascular status is normal    Vascular: Negative Homans and no palpable popliteal cords. 2+ pedal pulse with brisk cap refill    Gait Normal    THORACIC/LUMBAR SPINE     Inspection   Normal skin color and appearance, no ecchymosis, no swelling, and no scars.  No increased lumbar  lordosis. Pelvis is level without tilt.  No scoliosis.      ROM   Full forward flexion, extension, side bending and rotation.  There is no increased pain with ROM testing.      Palpation     There is no tenderness of the spinous processes, iliac crests, posterior superior iliac spines, sacroiliac joints, sacrum, coccyx, and greater trochanters.      Neuro   Straight leg raise is positive on the left     L4 neurologic testing reveals 4/5 strength in TA , hyperreflexive knee reflex, and normal sensation in L4 dermatome.      L5 neurologic testing reveals 5/5 strength in EHL and normal sensation in L5 dermatome.      S1 neurologic testing reveals 5/5 strength in peroneals, 2/2 achilles tendon reflex, and normal sensation in S1 dermatome.    IMAGING    X-ray Knee Ortho Left with Flexion  Narrative: EXAMINATION:  XR KNEE ORTHO LEFT WITH FLEXION    CLINICAL HISTORY:  . Pain in left knee    TECHNIQUE:  AP standing view of both knees, PA flexion standing views of both knees, and Merchant views of both knees were performed. A lateral view of the left knee was also performed.    COMPARISON:  None    FINDINGS:  Minor tricompartment DJD changes particularly intercondylar spinous processes, medial compartments and left inferior patellofemoral joint.  No fracture dislocation or joint effusion.  Impression: See results above.    Electronically signed by: Madi Ortega MD  Date:    08/07/2023  Time:    11:52        IMPRESSION       ICD-10-CM ICD-9-CM   1. Lumbar radiculopathy  M54.16 724.4   2. Left knee pain, unspecified chronicity  M25.562 719.46       MEDICATIONS PRESCRIBED      Medrol dose pack    RECOMMENDATIONS     Referral to physical therapy  RTC in 6 weeks with Sivakumar Lanier PA-C  If her symptoms persist we will order x-ray imaging of her lumbar spine in addition to an MRI of her lumbar spine      All questions were answered, pt will contact us for questions or concerns in the interim.

## 2023-08-09 ENCOUNTER — PATIENT MESSAGE (OUTPATIENT)
Dept: ORTHOPEDICS | Facility: CLINIC | Age: 38
End: 2023-08-09
Payer: COMMERCIAL

## 2023-08-09 ENCOUNTER — OFFICE VISIT (OUTPATIENT)
Dept: PRIMARY CARE CLINIC | Facility: CLINIC | Age: 38
End: 2023-08-09
Payer: COMMERCIAL

## 2023-08-09 DIAGNOSIS — I10 UNCONTROLLED HYPERTENSION: Primary | ICD-10-CM

## 2023-08-09 DIAGNOSIS — F41.8 DEPRESSION WITH ANXIETY: ICD-10-CM

## 2023-08-09 PROCEDURE — 1160F PR REVIEW ALL MEDS BY PRESCRIBER/CLIN PHARMACIST DOCUMENTED: ICD-10-PCS | Mod: CPTII,95,, | Performed by: FAMILY MEDICINE

## 2023-08-09 PROCEDURE — 1159F PR MEDICATION LIST DOCUMENTED IN MEDICAL RECORD: ICD-10-PCS | Mod: CPTII,95,, | Performed by: FAMILY MEDICINE

## 2023-08-09 PROCEDURE — 99214 OFFICE O/P EST MOD 30 MIN: CPT | Mod: 95,,, | Performed by: FAMILY MEDICINE

## 2023-08-09 PROCEDURE — 1159F MED LIST DOCD IN RCRD: CPT | Mod: CPTII,95,, | Performed by: FAMILY MEDICINE

## 2023-08-09 PROCEDURE — 99214 PR OFFICE/OUTPT VISIT, EST, LEVL IV, 30-39 MIN: ICD-10-PCS | Mod: 95,,, | Performed by: FAMILY MEDICINE

## 2023-08-09 PROCEDURE — 1160F RVW MEDS BY RX/DR IN RCRD: CPT | Mod: CPTII,95,, | Performed by: FAMILY MEDICINE

## 2023-08-09 RX ORDER — CHLORTHALIDONE 25 MG/1
25 TABLET ORAL DAILY
Qty: 30 TABLET | Refills: 2 | Status: SHIPPED | OUTPATIENT
Start: 2023-08-09 | End: 2024-08-08

## 2023-08-09 NOTE — PROGRESS NOTES
The patient location is: [place]  The chief complaint leading to consultation is: headache, elevated BP    Visit type: audiovisual    Face to Face time with patient:   25 minutes of total time spent on the encounter, which includes face to face time and non-face to face time preparing to see the patient (eg, review of tests), Obtaining and/or reviewing separately obtained history, Documenting clinical information in the electronic or other health record, Independently interpreting results (not separately reported) and communicating results to the patient/family/caregiver, or Care coordination (not separately reported).         Each patient to whom he or she provides medical services by telemedicine is:  (1) informed of the relationship between the physician and patient and the respective role of any other health care provider with respect to management of the patient; and (2) notified that he or she may decline to receive medical services by telemedicine and may withdraw from such care at any time.    Notes:    HPI:  Patient is a 37-year-old female with a history of hypertension, depression and anxiety here for follow-up of severe headaches last week and persistently elevated blood pressure.  Patient reports she was under a lot of financial stress and started experiencing headaches 10 days ago.  She was seen in the emergency room 1 week and received a Haldol injection which eventually relieved her headaches.  Patient admits to experiencing panic attack recently.  She did reach out for counselor and will be seen 1 within the next week.  She states her blood pressure remains uncontrolled with blood pressure readings of 139/103 and 140/100.  She was advised to increase her amlodipine to 10 mg daily.  She has been taking clonidine 0.1 mg once daily.  Patient states she has been exercising and trying to eat healthy for the past 6 weeks.  She has been walking 1-1/2 miles per day.      Physical exam:  General: Alert, no acute  distress, obese  HEENT:  NC/AT, no facial swelling of deformity  Neck:  supple  Resp:  Normal effort, no respiratory distress   Psych:  Anxious    Diagnoses and all orders for this visit:    Uncontrolled hypertension  Patient with persistent elevated blood pressure readings which be be associated with stress.  Will add chlorthalidone 25 mg 1 tablet daily to her current regimen.  Patient is to continue amlodipine 10 mg daily and metoprolol succinate 50 mg daily.  Advised patient to increase clonidine to 0.1 mg twice daily.  Advised patient to follow-up in 1 month for uncontrolled hypertension.  -     chlorthalidone (HYGROTEN) 25 MG Tab; Take 1 tablet (25 mg total) by mouth once daily.    Depression with anxiety  Patient continued to have severe anxiety with recent panic attacks due to multiple factors.  Patient has some financial stress and is still coping with the loss of her loved ones.  The patient will begin counseling in 1 week.  She is also under the care of Psychiatry as well.

## 2023-08-15 ENCOUNTER — OFFICE VISIT (OUTPATIENT)
Dept: PSYCHIATRY | Facility: CLINIC | Age: 38
End: 2023-08-15
Payer: COMMERCIAL

## 2023-08-15 ENCOUNTER — PATIENT MESSAGE (OUTPATIENT)
Dept: PSYCHIATRY | Facility: CLINIC | Age: 38
End: 2023-08-15

## 2023-08-15 DIAGNOSIS — F33.1 DEPRESSION, MAJOR, RECURRENT, MODERATE: Primary | ICD-10-CM

## 2023-08-15 DIAGNOSIS — F43.21 GRIEF: ICD-10-CM

## 2023-08-15 DIAGNOSIS — F41.9 ANXIETY DISORDER, UNSPECIFIED TYPE: ICD-10-CM

## 2023-08-15 DIAGNOSIS — F41.0 PANIC ATTACKS: ICD-10-CM

## 2023-08-15 PROCEDURE — 99214 OFFICE O/P EST MOD 30 MIN: CPT | Mod: 95,,, | Performed by: PSYCHIATRY & NEUROLOGY

## 2023-08-15 PROCEDURE — 99214 PR OFFICE/OUTPT VISIT, EST, LEVL IV, 30-39 MIN: ICD-10-PCS | Mod: 95,,, | Performed by: PSYCHIATRY & NEUROLOGY

## 2023-08-15 RX ORDER — ALPRAZOLAM 0.5 MG/1
.25-.5 TABLET ORAL 2 TIMES DAILY PRN
Qty: 60 TABLET | Refills: 2 | Status: SHIPPED | OUTPATIENT
Start: 2023-08-15 | End: 2023-09-18 | Stop reason: SDUPTHER

## 2023-08-15 RX ORDER — BUPROPION HYDROCHLORIDE 300 MG/1
300 TABLET ORAL DAILY
Qty: 90 TABLET | Refills: 0 | Status: SHIPPED | OUTPATIENT
Start: 2023-08-15 | End: 2023-09-18 | Stop reason: SDUPTHER

## 2023-08-15 NOTE — PROGRESS NOTES
"Gina Dickson   1985     Disclaimer: Evaluation and treatment is based on information presented to date. Any new information may affect assessment and findings.     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 / fluffy poodles or some similar name    Does say that has numerous family friend boyfriend who helping her cope      Have reminded her of IOP  Intensive Outpatient Program (IOP) / says ont interested / says doing better    Says thinking of going back to work     have  gave info [email or web address]     Psyc Meds: Likes advance of Wellbutrin XL from 150 mg to 300 mg  / also takes some intermittent Xanax 0.5 mg BID prn signif anxiety ; has some intermittent panic attack    Constitutional Health Concerns: no acute concerns     Was rx pain med for such / tho taking  only rarely       Wt Readings from Last 3 Encounters:   23 89.8 kg (197 lb 13.8 oz)   23 90.7 kg (200 lb)   23 91.6 kg (202 lb)      Laboratory Data  No visits with results within 1 Month(s) from this visit.   Latest known visit with results is:   Admission on 2023, Discharged on 2023   Component Date Value Ref Range Status    WBC 2023 8.60  3.90 - 12.70 K/uL Final    RBC 2023 5.10  4.00 - 5.40 M/uL Final    Hemoglobin 2023 14.3  12.0 - 16.0 g/dL Final    Hematocrit 2023 40.3  37.0 - 48.5 % Final    MCV 2023 79 (L)  82 - 98 fL Final    MCH 2023 28.0  27.0 - 31.0 pg " Final    MCHC 04/30/2023 35.5  32.0 - 36.0 g/dL Final    RDW 04/30/2023 14.2  11.5 - 14.5 % Final    Platelets 04/30/2023 211  150 - 450 K/uL Final    MPV 04/30/2023 9.5  9.2 - 12.9 fL Final    Immature Granulocytes 04/30/2023 0.3  0.0 - 0.5 % Final    Gran # (ANC) 04/30/2023 4.9  1.8 - 7.7 K/uL Final    Immature Grans (Abs) 04/30/2023 0.03  0.00 - 0.04 K/uL Final    Lymph # 04/30/2023 3.2  1.0 - 4.8 K/uL Final    Mono # 04/30/2023 0.4  0.3 - 1.0 K/uL Final    Eos # 04/30/2023 0.1  0.0 - 0.5 K/uL Final    Baso # 04/30/2023 0.05  0.00 - 0.20 K/uL Final    nRBC 04/30/2023 0  0 /100 WBC Final    Gran % 04/30/2023 56.7  38.0 - 73.0 % Final    Lymph % 04/30/2023 37.0  18.0 - 48.0 % Final    Mono % 04/30/2023 4.7  4.0 - 15.0 % Final    Eosinophil % 04/30/2023 0.7  0.0 - 8.0 % Final    Basophil % 04/30/2023 0.6  0.0 - 1.9 % Final    Differential Method 04/30/2023 Automated   Final    Sodium 04/30/2023 136  136 - 145 mmol/L Final    Potassium 04/30/2023 4.0  3.5 - 5.1 mmol/L Final    Chloride 04/30/2023 104  95 - 110 mmol/L Final    CO2 04/30/2023 22 (L)  23 - 29 mmol/L Final    Glucose 04/30/2023 89  70 - 110 mg/dL Final    BUN 04/30/2023 13  6 - 20 mg/dL Final    Creatinine 04/30/2023 0.9  0.5 - 1.4 mg/dL Final    Calcium 04/30/2023 9.4  8.7 - 10.5 mg/dL Final    Total Protein 04/30/2023 7.6  6.0 - 8.4 g/dL Final    Albumin 04/30/2023 4.2  3.5 - 5.2 g/dL Final    Total Bilirubin 04/30/2023 0.6  0.1 - 1.0 mg/dL Final    Alkaline Phosphatase 04/30/2023 83  55 - 135 U/L Final    AST 04/30/2023 15  10 - 40 U/L Final    ALT 04/30/2023 12  10 - 44 U/L Final    Anion Gap 04/30/2023 10  8 - 16 mmol/L Final    eGFR 04/30/2023 >60.0  >60 mL/min/1.73 m^2 Final    Troponin I 04/30/2023 <0.006  0.000 - 0.026 ng/mL Final    BNP 04/30/2023 28  0 - 99 pg/mL Final        Mental Status Exam:      Appearance: casual   Oriented: x 3   Attitude: cooperative   Eye Contact: good  Behavior: calm smiling at times     Mood: ok / better  Cognition:  alert  Concentration: grossly intact   Affect: appropriate range      Anxiety: mild / mod      Thought Process: goal directed     Speech:       Volume : WNL       Quantity WNL       Quality: appears to openly answer questions      Threats: no SI / no HI     Psychosis: denies all      Estimate of Intellectual Function: average   Impulse Control: no thoughts of harm to self/ others      Musculoskeletal: no tremor     Social: has many supports / family friends     Patient Active Problem List   Diagnosis    Uncontrolled hypertension    Depression with anxiety    Severe obesity (BMI 35.0-39.9) with comorbidity    Sickle-cell trait    Anxiety disorder    Depression, major, recurrent, moderateto severe    Grief    Thumb pain, right    Range of motion deficit    Swelling of right thumb          Current Outpatient Medications:     acetaminophen (TYLENOL) 650 MG TbSR, Take 1 tablet (650 mg total) by mouth every 8 (eight) hours., Disp: 90 tablet, Rfl: 0    ALPRAZolam (XANAX) 0.5 MG tablet, Take 0.5-1 tablets (0.25-0.5 mg total) by mouth 2 (two) times daily as needed (SIGNIFICANT ANXIETY)., Disp: 60 tablet, Rfl: 2    amLODIPine (NORVASC) 10 MG tablet, Take 1 tablet (10 mg total) by mouth once daily., Disp: 90 tablet, Rfl: 1    buPROPion (WELLBUTRIN XL) 300 MG 24 hr tablet, Take 1 tablet (300 mg total) by mouth once daily., Disp: 90 tablet, Rfl: 0    butalbital-acetaminophen-caffeine -40 mg (FIORICET, ESGIC) -40 mg per tablet, Take 1 tablet by mouth every 6 (six) hours as needed. Label with sedative precautions, Disp: 20 tablet, Rfl: 0    cetirizine (ZYRTEC) 10 MG tablet, Take 1 tablet (10 mg total) by mouth every evening. for 10 days, Disp: 10 tablet, Rfl: 0    chlorthalidone (HYGROTEN) 25 MG Tab, Take 1 tablet (25 mg total) by mouth once daily., Disp: 30 tablet, Rfl: 2    cloNIDine (CATAPRES) 0.1 MG tablet, Take 1 tablet (0.1 mg total) by mouth once daily., Disp: 60 tablet, Rfl: 1    ibuprofen (ADVIL,MOTRIN) 600 MG  tablet, Take 1 tablet (600 mg total) by mouth 3 (three) times daily., Disp: 90 tablet, Rfl: 0    methylPREDNISolone (MEDROL DOSEPACK) 4 mg tablet, use as directed, Disp: 21 each, Rfl: 0    metoprolol succinate (TOPROL-XL) 50 MG 24 hr tablet, Take 1 tablet (50 mg total) by mouth once daily., Disp: 90 tablet, Rfl: 0     Social History     Tobacco Use   Smoking Status Former    Current packs/day: 0.00   Smokeless Tobacco Never        Review of patient's allergies indicates:  No Known Allergies     ASSESSMENT:   Encounter Diagnoses   Name Primary?    Depression, major, recurrent, moderateto severe Yes    Grief     Anxiety disorder, unspecified type     Panic attacks        Patient Instructions       PLAN:   Follow up     Informs that has 1st appt on 8-15-23 with counselor Rl Walker [facility]  counselor [facility]      9/13/2023  1:00 PM ESTABLISHED PATIENT - VIRTUAL-TELEHEALTH [facility] [address] [facility], Francisco Javier MANLEY MD      10/25/2023  3:00 PM ESTABLISHED PATIENT EXTENDED IN CLINIC  [facility] [address] [facility], Francisco Javier MANLEY MD     Meds renew as prior     Have reviewed  discussed therpay principles grief coping skills and gave info www.[email or web address]     References:     Relaxation stress reduction workbook: ANA Mandujano PhD ( used: $7-10)    Feeling Good Website: Francisco Javier Juarez MD / [email or web address] website (free) / jerry. PODCASTS    Anxiety &  phobia workbook by ALEXEI Seth PhD  (web retailers: used: $ 7-10)    [place]: Path to Better Sleep : https://www.veterantraining.[place].gov/insomnia/ (free)       Pt expressed appreciation for the visit today and did not have further question at this time though pt  was still informed to:     Call  if problems.    Call / Report Side Effects to Psyc MD     Encouraged to follow up with primary care / Gen Med MD for continued monitoring of general health and wellness.  Y aug 30  2023

## 2023-08-15 NOTE — PATIENT INSTRUCTIONS
PLAN:   Follow up     Informs that has 1st [facility] 8[facility]      9/13/2023  1:00 PM ESTABLISHED PATIENT - VIRTUAL-TELEHEALTH Ivan [facility] [address] [facility], Francisco Javier MANLEY MD      10/25/2023  3:00 PM ESTABLISHED PATIENT EXTENDED IN CLINIC  I[facility] Sp[facility] [address], Francisco Javier MANLEY MD     Meds renew as prior     Have reviewed  discussed therpay principles grief coping skills and gave info www.[email or web address]     References:     Relaxation stress reduction workbook: ANA Mandujano PhD ( used: $7-10)    Feeling Good Website: Francisco Javier Juarez MD / [email or web address] website (free) / jerry. PODCASTS    Anxiety &  phobia workbook by ALEXEI Seth PhD  (web retailers: used: $ 7-10)    [place]: Path to Better Sleep : https://www.veterantraining.[place]/insomnia/ (free)       Pt expressed appreciation for the visit today and did not have further question at this time though pt  was still informed to:     Call  if problems.    Call / Report Side Effects to Psyc MD     Encouraged to follow up with primary care / Gen Med MD for continued monitoring of general health and wellness.  Y aug 30  2023

## 2023-08-17 ENCOUNTER — OFFICE VISIT (OUTPATIENT)
Dept: OBSTETRICS AND GYNECOLOGY | Facility: CLINIC | Age: 38
End: 2023-08-17
Payer: COMMERCIAL

## 2023-08-17 ENCOUNTER — APPOINTMENT (OUTPATIENT)
Dept: RADIOLOGY | Facility: OTHER | Age: 38
End: 2023-08-17
Payer: COMMERCIAL

## 2023-08-17 VITALS
HEIGHT: 62 IN | BODY MASS INDEX: 35.19 KG/M2 | DIASTOLIC BLOOD PRESSURE: 72 MMHG | WEIGHT: 191.25 LBS | SYSTOLIC BLOOD PRESSURE: 130 MMHG

## 2023-08-17 DIAGNOSIS — Z12.4 ENCOUNTER FOR PAPANICOLAOU SMEAR FOR CERVICAL CANCER SCREENING: ICD-10-CM

## 2023-08-17 DIAGNOSIS — Z01.419 ENCOUNTER FOR WELL WOMAN EXAM WITH ROUTINE GYNECOLOGICAL EXAM: Primary | ICD-10-CM

## 2023-08-17 DIAGNOSIS — Z11.51 SCREENING FOR HUMAN PAPILLOMAVIRUS (HPV): ICD-10-CM

## 2023-08-17 DIAGNOSIS — Z30.49 SURVEILLANCE FOR CONTRACEPTION BARRIER OR SPERMICIDE: ICD-10-CM

## 2023-08-17 DIAGNOSIS — Z12.31 SCREENING MAMMOGRAM FOR BREAST CANCER: ICD-10-CM

## 2023-08-17 PROCEDURE — 77063 BREAST TOMOSYNTHESIS BI: CPT | Mod: 26,,, | Performed by: RADIOLOGY

## 2023-08-17 PROCEDURE — 77067 SCR MAMMO BI INCL CAD: CPT | Mod: TC,PN

## 2023-08-17 PROCEDURE — 3075F PR MOST RECENT SYSTOLIC BLOOD PRESS GE 130-139MM HG: ICD-10-PCS | Mod: CPTII,S$GLB,,

## 2023-08-17 PROCEDURE — 77063 MAMMO DIGITAL SCREENING BILAT WITH TOMO: ICD-10-PCS | Mod: 26,,, | Performed by: RADIOLOGY

## 2023-08-17 PROCEDURE — 3008F PR BODY MASS INDEX (BMI) DOCUMENTED: ICD-10-PCS | Mod: CPTII,S$GLB,,

## 2023-08-17 PROCEDURE — 3008F BODY MASS INDEX DOCD: CPT | Mod: CPTII,S$GLB,,

## 2023-08-17 PROCEDURE — 3078F PR MOST RECENT DIASTOLIC BLOOD PRESSURE < 80 MM HG: ICD-10-PCS | Mod: CPTII,S$GLB,,

## 2023-08-17 PROCEDURE — 99999 PR PBB SHADOW E&M-EST. PATIENT-LVL IV: ICD-10-PCS | Mod: PBBFAC,,,

## 2023-08-17 PROCEDURE — 3078F DIAST BP <80 MM HG: CPT | Mod: CPTII,S$GLB,,

## 2023-08-17 PROCEDURE — 99999 PR PBB SHADOW E&M-EST. PATIENT-LVL IV: CPT | Mod: PBBFAC,,,

## 2023-08-17 PROCEDURE — 1159F PR MEDICATION LIST DOCUMENTED IN MEDICAL RECORD: ICD-10-PCS | Mod: CPTII,S$GLB,,

## 2023-08-17 PROCEDURE — 87624 HPV HI-RISK TYP POOLED RSLT: CPT

## 2023-08-17 PROCEDURE — 77067 MAMMO DIGITAL SCREENING BILAT WITH TOMO: ICD-10-PCS | Mod: 26,,, | Performed by: RADIOLOGY

## 2023-08-17 PROCEDURE — 88175 CYTOPATH C/V AUTO FLUID REDO: CPT

## 2023-08-17 PROCEDURE — 99395 PR PREVENTIVE VISIT,EST,18-39: ICD-10-PCS | Mod: S$GLB,,,

## 2023-08-17 PROCEDURE — 1159F MED LIST DOCD IN RCRD: CPT | Mod: CPTII,S$GLB,,

## 2023-08-17 PROCEDURE — 77067 SCR MAMMO BI INCL CAD: CPT | Mod: 26,,, | Performed by: RADIOLOGY

## 2023-08-17 PROCEDURE — 99395 PREV VISIT EST AGE 18-39: CPT | Mod: S$GLB,,,

## 2023-08-17 PROCEDURE — 3075F SYST BP GE 130 - 139MM HG: CPT | Mod: CPTII,S$GLB,,

## 2023-08-17 RX ORDER — LACTIC ACID, L-, CITRIC ACID MONOHYDRATE, AND POTASSIUM BITARTRATE 90; 50; 20 MG/5G; MG/5G; MG/5G
1 GEL VAGINAL ONCE AS NEEDED
Qty: 60 G | Refills: 3 | Status: SHIPPED | OUTPATIENT
Start: 2023-08-17 | End: 2023-09-01 | Stop reason: SDUPTHER

## 2023-08-17 NOTE — PROGRESS NOTES
"Chief Complaint: Well Woman Exam     HPI:      Gina Dickson is a 37 y.o.  who presents today as a new patient for a well woman exam.  LMP: Patient's last menstrual period was 2023 (exact date).  Periods are normal and regular.  Reports recurrent BV infections after unprotected sexual intercourse.  Was most recently treated about 3 weeks ago with Flagyl from urgent care. Uses boric acid suppositories PRN. No other issues, problems, or complaints. Specifically, patient denies abnormal vaginal bleeding, discharge, pelvic pain, urinary problems, or changes in appetite. Ms. Dickson is currently sexually active with a single male partner. She is currently using no method for contraception. She declines STD screening today.    Previous Pap:  uknown    Social: Wears seatbelts. Exercises - walking, lost 15 lbs. Feels safe at home.   Smoking status: Former    FH:  Breast cancer: maternal aunt  Colon cancer: maternal aunt  Ovarian cancer: none  Endometrial cancer: none    Ms. Dickson confirms that she wears her seatbelt when riding in the car and does not text while driving.     OB History          3    Para   3    Term   2       1    AB   0    Living   2         SAB   0    IAB   0    Ectopic   0    Multiple   0    Live Births   2                 ROS:     GENERAL: Denies unintentional weight gain or weight loss. Feeling well overall.   SKIN: Denies rash or lesions.   HEENT: Denies headaches, or vision changes.   CARDIOVASCULAR: Denies palpitations or chest pain.   RESPIRATORY: Denies shortness of breath or dyspnea on exertion.  BREASTS: Denies lumps or nipple discharge.   ABDOMEN: Denies constipation, diarrhea, nausea, vomiting, change in appetite.  URINARY: Denies frequency, dysuria.  NEUROLOGIC: Denies syncope or weakness.   PSYCHIATRIC: Denies uncontrolled depression or anxiety.    Physical Exam:      PHYSICAL EXAM:  /72   Ht 5' 2" (1.575 m)   Wt 86.7 kg (191 lb 4 oz)   LMP " 07/31/2023 (Exact Date)   BMI 34.98 kg/m²   Body mass index is 34.98 kg/m².     APPEARANCE: Well nourished, well developed, in no acute distress.  PSYCH: Appropriate mood and affect.  SKIN: No acne or hirsutism  NECK: Neck symmetric without masses  NODES: No inguinal, axillary, or supraclavicular lymph node enlargement  ABDOMEN: Soft.  No tenderness or masses.    CARDIOVASCULAR: No edema of peripheral extremities  BREASTS: Symmetrical, no visible skin lesions. No palpable masses. No nipple discharge bilaterally.  PELVIC: Normal external genitalia without lesions.  Normal hair distribution.  Adequate perineal body, normal urethral meatus.  Vagina moist and well rugated. Without lesions. Vagina without discharge.  Cervix pink, without lesions, discharge or tenderness.  No significant cystocele or rectocele.  Bimanual exam shows uterus to be normal size, regular, mobile and nontender.  Adnexa without masses or tenderness.      Assessment/Plan:     Encounter for well woman exam with routine gynecological exam  -     HPV High Risk Genotypes, PCR  -     Counseled patient regarding healthy diet and regular exercise, daily multivitamin, daily seat belt use.         -     BP normotensive        -     She denies abuse and feels safe at home.        -     Pap smear:  performed   -     Contraception:  discussed, pt not interested in hormone containing birth control, see below        -     STD screening:  declined         -     MMG:  due for baseline, orders placed    Screening for human papillomavirus (HPV)  -     HPV High Risk Genotypes, PCR    Encounter for Papanicolaou smear for cervical cancer screening  -     Liquid-Based Pap Smear, Screening    Screening mammogram for breast cancer  -     Mammo Digital Screening Bilat w/ Kaveh; Future; Expected date: 08/17/2023    Surveillance for contraception barrier or spermicide  -     Discussed options for birth control.  Pt declines hormones containing BC at this time.  -      Counseled pt on phexxi spermicide for contraception use and for prevention of BV.  Pt interested in trailing.  Rx sent to pharmacy.  -     lactic acid-citric-potassium (PHEXXI) 1.8-1-0.4 % Gel; Place 1 applicator vaginally 1 (one) time if needed (prior to sexual intercourse).  Dispense: 60 g; Refill: 3    Follow up in about 1 year for annual exam or sooner PRN.    Counseling:     Patient was counseled today on current ASCCP pap guidelines, the recommendation for yearly physical exams, healthy diet and exercise routines, safe driving habits, and breast self awareness. She is to see her PCP for other health maintenance.     Use of the [employer] Patient Portal discussed and encouraged during today's visit.   Counseling time: 15 minutes    Sanjuana Le (Maggie), KELSEY  Obstetrics and Gynecology  [facility]

## 2023-08-18 DIAGNOSIS — I10 UNCONTROLLED HYPERTENSION: ICD-10-CM

## 2023-08-18 NOTE — TELEPHONE ENCOUNTER
No care due was identified.  [facility] Embedded Care Due Messages. Reference number: [id number].   8/18/2023 10:43:21 AM CDT

## 2023-08-19 RX ORDER — METOPROLOL SUCCINATE 50 MG/1
50 TABLET, EXTENDED RELEASE ORAL
Qty: 90 TABLET | Refills: 3 | Status: SHIPPED | OUTPATIENT
Start: 2023-08-19 | End: 2023-08-23 | Stop reason: SDUPTHER

## 2023-08-19 NOTE — TELEPHONE ENCOUNTER
Refill Decision Note   Milosaira Dickson  is requesting a refill authorization.  Brief Assessment and Rationale for Refill:  Approve     Medication Therapy Plan:         Comments:     Note composed:5:13 PM 08/19/2023

## 2023-08-21 ENCOUNTER — PATIENT MESSAGE (OUTPATIENT)
Dept: PSYCHIATRY | Facility: CLINIC | Age: 38
End: 2023-08-21
Payer: COMMERCIAL

## 2023-08-22 ENCOUNTER — OFFICE VISIT (OUTPATIENT)
Dept: ORTHOPEDICS | Facility: CLINIC | Age: 38
End: 2023-08-22
Payer: COMMERCIAL

## 2023-08-22 VITALS — HEIGHT: 62 IN | WEIGHT: 191 LBS | BODY MASS INDEX: 35.15 KG/M2

## 2023-08-22 DIAGNOSIS — M65.311 TRIGGER THUMB, RIGHT THUMB: Primary | ICD-10-CM

## 2023-08-22 PROCEDURE — 99999 PR PBB SHADOW E&M-EST. PATIENT-LVL IV: ICD-10-PCS | Mod: PBBFAC,,, | Performed by: ORTHOPAEDIC SURGERY

## 2023-08-22 PROCEDURE — 99214 PR OFFICE/OUTPT VISIT, EST, LEVL IV, 30-39 MIN: ICD-10-PCS | Mod: S$GLB,,, | Performed by: ORTHOPAEDIC SURGERY

## 2023-08-22 PROCEDURE — 3008F PR BODY MASS INDEX (BMI) DOCUMENTED: ICD-10-PCS | Mod: CPTII,S$GLB,, | Performed by: ORTHOPAEDIC SURGERY

## 2023-08-22 PROCEDURE — 99999 PR PBB SHADOW E&M-EST. PATIENT-LVL IV: CPT | Mod: PBBFAC,,, | Performed by: ORTHOPAEDIC SURGERY

## 2023-08-22 PROCEDURE — 99214 OFFICE O/P EST MOD 30 MIN: CPT | Mod: S$GLB,,, | Performed by: ORTHOPAEDIC SURGERY

## 2023-08-22 PROCEDURE — 1159F MED LIST DOCD IN RCRD: CPT | Mod: CPTII,S$GLB,, | Performed by: ORTHOPAEDIC SURGERY

## 2023-08-22 PROCEDURE — 1159F PR MEDICATION LIST DOCUMENTED IN MEDICAL RECORD: ICD-10-PCS | Mod: CPTII,S$GLB,, | Performed by: ORTHOPAEDIC SURGERY

## 2023-08-22 PROCEDURE — 3008F BODY MASS INDEX DOCD: CPT | Mod: CPTII,S$GLB,, | Performed by: ORTHOPAEDIC SURGERY

## 2023-08-22 RX ORDER — MUPIROCIN 20 MG/G
OINTMENT TOPICAL
Status: CANCELLED | OUTPATIENT
Start: 2023-08-22

## 2023-08-22 RX ORDER — CEFAZOLIN SODIUM 2 G/50ML
2 SOLUTION INTRAVENOUS
Status: CANCELLED | OUTPATIENT
Start: 2023-08-22

## 2023-08-22 NOTE — PROGRESS NOTES
[facility]  History & Physical  Orthopedics     SUBJECTIVE:       Chief Complaint: Right thumb     Referring Provider: No ref. provider found      Dr. Elias is the supervising physician for this encounter/patient     History of Present Illness:  Patient is a 37 y.o. right hand dominant female who is status post a right thumb MCP UCL repair with internal brace.  This has done great.  She has been having some clicking and triggering sensations to the right thumb with pain at the A1 pulley.  She would like to discuss options for this today and has no other complaints.     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      Symptoms are aggravated by activity and movement.     Symptoms are alleviated by rest.     Symptoms consist of pain, triggering     The patient rates their pain as severe     Attempted treatment(s) and/or interventions include activity modifications, rest, anti-inflammatory medications     The patient denies any fevers, chills, N/V, D/C and presents for evaluation.             Past Medical History:   Diagnosis Date    Fibroids      Hypertension        No past surgical history on file.  Review of patient's allergies indicates:  No Known Allergies  Social History          Social History Narrative    Not on file            Family History   Problem Relation Age of Onset    Lupus Mother      Hypertension Father      Cancer Maternal Aunt           colon, breast            Current Outpatient Medications:     acetaminophen (TYLENOL) 500 MG tablet, Take 2 tablets (1,000 mg total) by mouth every 8 (eight) hours as needed for Pain., Disp: 60 tablet, Rfl: 0    ALPRAZolam (XANAX) 0.5 MG tablet, Take 0.5-1 tablets (0.25-0.5 mg total) by mouth 2 (two) times daily as needed (SIGNIFICANT ANXIETY)., Disp: 60 tablet, Rfl: 0    buPROPion (WELLBUTRIN XL) 150 MG TB24 tablet, Take 1 tablet (150 mg total) by mouth once daily., Disp: 90 tablet, Rfl: 0    cetirizine (ZYRTEC) 10 MG tablet, Take 1 tablet (10 mg  "total) by mouth every evening. for 10 days, Disp: 10 tablet, Rfl: 0    HYDROcodone-acetaminophen (NORCO) 5-325 mg per tablet, Take 1 tablet by mouth every 12 (twelve) hours as needed for Pain., Disp: 10 tablet, Rfl: 0    meloxicam (MOBIC) 15 MG tablet, Take 1 tablet (15 mg total) by mouth once daily. (Patient not taking: Reported on 3/31/2023), Disp: 30 tablet, Rfl: 0    meloxicam (MOBIC) 15 MG tablet, Take 1 tablet (15 mg total) by mouth once daily., Disp: 30 tablet, Rfl: 0    metoprolol succinate (TOPROL-XL) 50 MG 24 hr tablet, Take 1 tablet (50 mg total) by mouth once daily., Disp: 90 tablet, Rfl: 0    traMADoL (ULTRAM) 50 mg tablet, Take 1 tablet (50 mg total) by mouth every 12 (twelve) hours as needed for Pain., Disp: 20 tablet, Rfl: 0        Review of Systems:  Constitutional: no fever or chills  Eyes: no visual changes  ENT: no nasal congestion or sore throat  Respiratory: no cough or shortness of breath  Cardiovascular: no chest pain  Gastrointestinal: no nausea or vomiting, tolerating diet  Musculoskeletal: pain, soreness, numbness/tingling, and decreased ROM     OBJECTIVE:       Vital Signs (Most Recent):  Vitals       Vitals:     04/25/23 1150   Weight: 92.5 kg (204 lb)   Height: 5' 2" (1.575 m)         Body mass index is 37.31 kg/m².        Physical Exam:  Constitutional: The patient appears well-developed and well-nourished. No distress.   Skin: No lesions appreciated  Head: Normocephalic and atraumatic.   Nose: Nose normal.   Ears: No deformities seen  Eyes: Conjunctivae and EOM are normal.   Neck: No tracheal deviation present.   Cardiovascular: Normal rate and intact distal pulses.    Pulmonary/Chest: Effort normal. No respiratory distress.   Abdominal: There is no guarding.   Neurological: The patient is alert.   Psychiatric: The patient has a normal mood and affect.      Right Hand/Wrist Examination:     Observation/Inspection:  Swelling                       none                      Deformity     "                 none  Discoloration               none                  Scars                           right thumb UCL, well-healed  Atrophy                        none  Right thumb A1 pulley severely tender to palpation with some clicking felt with range of motion     HAND/WRIST EXAMINATION:  Finkelstein's Test                                Neg  WHAT Test                                         Neg  Snuff box tenderness                          Neg  Man's Test                                     Neg  Hook of Hamate Tenderness              Neg  CMC grind                                           Neg  Circumduction test                              Neg    Examination of the right thumb demonstrates very stable ulnar collateral ligament with firm endpoint       Neurovascular Exam:  Digits WWP, brisk CR < 3s throughout  NVI motor/LTS to M/R/U nerves, radial pulse 2+  Tinel's Test - Carpal Tunnel                Neg  Tinel's Test - Cubital Tunnel               Neg  Phalen's Test                                      Neg  Median Nerve Compression Test       Neg     ROM hand:  Full and painless     ROM wrist full, painless    ROM elbow full, painless     Abdomen not guarded  Respirations nonlabored  Perfusion intact     Diagnostic Results:     Imaging - I independently viewed the patient's imaging as well as the radiology report.       FINDINGS:  No acute fractures.  Preserved bone density.  Preserved joint spaces.  No opaque soft tissue foreign bodies.  Soft tissue swelling dorsally at the level of the metacarpals and MCP articulations.      Impression:     As above     EMG - none     ASSESSMENT/PLAN:       37 y.o. yo female with Right trigger thumb     Plan: The patient and I had a thorough discussion today.  We discussed the working diagnosis as well as several other potential alternative diagnoses.  Treatment options were discussed, both conservative and surgical.  Conservative treatment options would include  things such as activity modifications, workplace modifications, a period of rest, oral vs topical OTC and prescription anti-inflammatory medications, occupational therapy, splinting/bracing, immobilization, corticosteroid injections, and others.  Surgical options were discussed as well.      At this time, the patient like to proceed with right thumb A1 pulley release on September 6th, 2023 which I feel is reasonable.  We will get this scheduled.      The patient has not responded to adequate non operative treatment at this time and/or non operative treatment is not indicated. Thus, the risks, benefits and alternatives to surgery were discussed with the patient in detail.  Specific risks include but are not limited to bleeding, infection, vessel and/or nerve damage, pain, numbness, tingling, compartment syndrome, need for additional surgery, failure to return to pre-injury and/or preoperative functional status, inability to return to work, scar sensitivity, delayed healing, complex regional pain syndrome, weakness, pulley injury, tendon injury, bowstringing, partial and/or incomplete relief of symptoms, persistence of and/or worsening of symptoms, hardware and/or surgical failure, prominent and/or symptomatic hardware possibly necessitating future removal, osteomyelitis, amputation, loss of function, stiffness, rotational malalignment, functional debility, dysfunction, decreased  strength, need for prolonged postoperative rehabilitation, malunion, nonunion, deep venous thrombosis, pulmonary embolism, arthritis and death.  The patient states an understanding and wishes to proceed with surgery.   All questions were answered.  No guarantees were implied or stated.  Written informed consent was obtained.

## 2023-08-22 NOTE — H&P
[facility]  History & Physical  Orthopedics     SUBJECTIVE:       Chief Complaint: Right thumb     Referring Provider: No ref. provider found      Dr. Elias is the supervising physician for this encounter/patient     History of Present Illness:  Patient is a 37 y.o. right hand dominant female who is status post a right thumb MCP UCL repair with internal brace.  This has done great.  She has been having some clicking and triggering sensations to the right thumb with pain at the A1 pulley.  She would like to discuss options for this today and has no other complaints.     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      Symptoms are aggravated by activity and movement.     Symptoms are alleviated by rest.     Symptoms consist of pain, triggering     The patient rates their pain as severe     Attempted treatment(s) and/or interventions include activity modifications, rest, anti-inflammatory medications     The patient denies any fevers, chills, N/V, D/C and presents for evaluation.                Past Medical History:   Diagnosis Date    Fibroids      Hypertension        No past surgical history on file.  Review of patient's allergies indicates:  No Known Allergies  Social History            Social History Narrative    Not on file                Family History   Problem Relation Age of Onset    Lupus Mother      Hypertension Father      Cancer Maternal Aunt           colon, breast            Current Outpatient Medications:     acetaminophen (TYLENOL) 500 MG tablet, Take 2 tablets (1,000 mg total) by mouth every 8 (eight) hours as needed for Pain., Disp: 60 tablet, Rfl: 0    ALPRAZolam (XANAX) 0.5 MG tablet, Take 0.5-1 tablets (0.25-0.5 mg total) by mouth 2 (two) times daily as needed (SIGNIFICANT ANXIETY)., Disp: 60 tablet, Rfl: 0    buPROPion (WELLBUTRIN XL) 150 MG TB24 tablet, Take 1 tablet (150 mg total) by mouth once daily., Disp: 90 tablet, Rfl: 0    cetirizine (ZYRTEC) 10 MG tablet, Take 1 tablet  "(10 mg total) by mouth every evening. for 10 days, Disp: 10 tablet, Rfl: 0    HYDROcodone-acetaminophen (NORCO) 5-325 mg per tablet, Take 1 tablet by mouth every 12 (twelve) hours as needed for Pain., Disp: 10 tablet, Rfl: 0    meloxicam (MOBIC) 15 MG tablet, Take 1 tablet (15 mg total) by mouth once daily. (Patient not taking: Reported on 3/31/2023), Disp: 30 tablet, Rfl: 0    meloxicam (MOBIC) 15 MG tablet, Take 1 tablet (15 mg total) by mouth once daily., Disp: 30 tablet, Rfl: 0    metoprolol succinate (TOPROL-XL) 50 MG 24 hr tablet, Take 1 tablet (50 mg total) by mouth once daily., Disp: 90 tablet, Rfl: 0    traMADoL (ULTRAM) 50 mg tablet, Take 1 tablet (50 mg total) by mouth every 12 (twelve) hours as needed for Pain., Disp: 20 tablet, Rfl: 0        Review of Systems:  Constitutional: no fever or chills  Eyes: no visual changes  ENT: no nasal congestion or sore throat  Respiratory: no cough or shortness of breath  Cardiovascular: no chest pain  Gastrointestinal: no nausea or vomiting, tolerating diet  Musculoskeletal: pain, soreness, numbness/tingling, and decreased ROM     OBJECTIVE:       Vital Signs (Most Recent):  Vitals         Vitals:     04/25/23 1150   Weight: 92.5 kg (204 lb)   Height: 5' 2" (1.575 m)         Body mass index is 37.31 kg/m².        Physical Exam:  Constitutional: The patient appears well-developed and well-nourished. No distress.   Skin: No lesions appreciated  Head: Normocephalic and atraumatic.   Nose: Nose normal.   Ears: No deformities seen  Eyes: Conjunctivae and EOM are normal.   Neck: No tracheal deviation present.   Cardiovascular: Normal rate and intact distal pulses.    Pulmonary/Chest: Effort normal. No respiratory distress.   Abdominal: There is no guarding.   Neurological: The patient is alert.   Psychiatric: The patient has a normal mood and affect.      Right Hand/Wrist Examination:     Observation/Inspection:  Swelling                       none                    "   Deformity                     none  Discoloration               none                  Scars                           right thumb UCL, well-healed  Atrophy                        none  Right thumb A1 pulley severely tender to palpation with some clicking felt with range of motion      HAND/WRIST EXAMINATION:  Finkelstein's Test                                Neg  WHAT Test                                         Neg  Snuff box tenderness                          Neg  Man's Test                                     Neg  Hook of Hamate Tenderness              Neg  CMC grind                                           Neg  Circumduction test                              Neg     Examination of the right thumb demonstrates very stable ulnar collateral ligament with firm endpoint        Neurovascular Exam:  Digits WWP, brisk CR < 3s throughout  NVI motor/LTS to M/R/U nerves, radial pulse 2+  Tinel's Test - Carpal Tunnel                Neg  Tinel's Test - Cubital Tunnel               Neg  Phalen's Test                                      Neg  Median Nerve Compression Test       Neg     ROM hand:  Full and painless     ROM wrist full, painless    ROM elbow full, painless     Abdomen not guarded  Respirations nonlabored  Perfusion intact     Diagnostic Results:     Imaging - I independently viewed the patient's imaging as well as the radiology report.       FINDINGS:  No acute fractures.  Preserved bone density.  Preserved joint spaces.  No opaque soft tissue foreign bodies.  Soft tissue swelling dorsally at the level of the metacarpals and MCP articulations.      Impression:     As above     EMG - none     ASSESSMENT/PLAN:       37 y.o. yo female with Right trigger thumb     Plan: The patient and I had a thorough discussion today.  We discussed the working diagnosis as well as several other potential alternative diagnoses.  Treatment options were discussed, both conservative and surgical.  Conservative treatment options  would include things such as activity modifications, workplace modifications, a period of rest, oral vs topical OTC and prescription anti-inflammatory medications, occupational therapy, splinting/bracing, immobilization, corticosteroid injections, and others.  Surgical options were discussed as well.      At this time, the patient like to proceed with right thumb A1 pulley release on September 6th, 2023 which I feel is reasonable.  We will get this scheduled.      The patient has not responded to adequate non operative treatment at this time and/or non operative treatment is not indicated. Thus, the risks, benefits and alternatives to surgery were discussed with the patient in detail.  Specific risks include but are not limited to bleeding, infection, vessel and/or nerve damage, pain, numbness, tingling, compartment syndrome, need for additional surgery, failure to return to pre-injury and/or preoperative functional status, inability to return to work, scar sensitivity, delayed healing, complex regional pain syndrome, weakness, pulley injury, tendon injury, bowstringing, partial and/or incomplete relief of symptoms, persistence of and/or worsening of symptoms, hardware and/or surgical failure, prominent and/or symptomatic hardware possibly necessitating future removal, osteomyelitis, amputation, loss of function, stiffness, rotational malalignment, functional debility, dysfunction, decreased  strength, need for prolonged postoperative rehabilitation, malunion, nonunion, deep venous thrombosis, pulmonary embolism, arthritis and death.  The patient states an understanding and wishes to proceed with surgery.   All questions were answered.  No guarantees were implied or stated.  Written informed consent was obtained.

## 2023-08-23 DIAGNOSIS — I10 UNCONTROLLED HYPERTENSION: ICD-10-CM

## 2023-08-23 LAB
HPV HR 12 DNA SPEC QL NAA+PROBE: NEGATIVE
HPV16 AG SPEC QL: NEGATIVE
HPV18 DNA SPEC QL NAA+PROBE: POSITIVE

## 2023-08-23 RX ORDER — CLONIDINE HYDROCHLORIDE 0.1 MG/1
0.1 TABLET ORAL DAILY
Qty: 60 TABLET | Refills: 0
Start: 2023-08-23

## 2023-08-23 RX ORDER — METOPROLOL SUCCINATE 50 MG/1
50 TABLET, EXTENDED RELEASE ORAL DAILY
Qty: 90 TABLET | Refills: 0 | Status: SHIPPED | OUTPATIENT
Start: 2023-08-23

## 2023-08-23 RX ORDER — AMLODIPINE BESYLATE 10 MG/1
10 TABLET ORAL DAILY
Qty: 90 TABLET | Refills: 0 | Status: SHIPPED | OUTPATIENT
Start: 2023-08-23 | End: 2024-02-19

## 2023-08-24 LAB
FINAL PATHOLOGIC DIAGNOSIS: NORMAL
Lab: NORMAL

## 2023-08-28 ENCOUNTER — TELEPHONE (OUTPATIENT)
Dept: OBSTETRICS AND GYNECOLOGY | Facility: CLINIC | Age: 38
End: 2023-08-28
Payer: COMMERCIAL

## 2023-08-28 ENCOUNTER — ANESTHESIA EVENT (OUTPATIENT)
Dept: SURGERY | Facility: HOSPITAL | Age: 38
End: 2023-08-28
Payer: COMMERCIAL

## 2023-08-28 NOTE — TELEPHONE ENCOUNTER
Contacted patient regarding results of pap smear and positive HPV.  Discussed further screening procedure with colposcopy.  All questions answered regarding procedure.  Reassurance given.  Will have someone from the office reach out to the patient to get her scheduled with one of our providers for the procedure.

## 2023-09-01 DIAGNOSIS — Z30.49 SURVEILLANCE FOR CONTRACEPTION BARRIER OR SPERMICIDE: ICD-10-CM

## 2023-09-01 RX ORDER — LACTIC ACID, L-, CITRIC ACID MONOHYDRATE, AND POTASSIUM BITARTRATE 90; 50; 20 MG/5G; MG/5G; MG/5G
1 GEL VAGINAL ONCE AS NEEDED
Qty: 60 G | Refills: 3 | Status: SHIPPED | OUTPATIENT
Start: 2023-09-01

## 2023-09-03 ENCOUNTER — PATIENT MESSAGE (OUTPATIENT)
Dept: OBSTETRICS AND GYNECOLOGY | Facility: CLINIC | Age: 38
End: 2023-09-03
Payer: COMMERCIAL

## 2023-09-05 ENCOUNTER — PATIENT MESSAGE (OUTPATIENT)
Dept: ORTHOPEDICS | Facility: CLINIC | Age: 38
End: 2023-09-05
Payer: COMMERCIAL

## 2023-09-06 ENCOUNTER — ANESTHESIA (OUTPATIENT)
Dept: SURGERY | Facility: HOSPITAL | Age: 38
End: 2023-09-06
Payer: COMMERCIAL

## 2023-09-06 ENCOUNTER — HOSPITAL ENCOUNTER (OUTPATIENT)
Facility: HOSPITAL | Age: 38
Discharge: HOME | End: 2023-09-06
Attending: ORTHOPAEDIC SURGERY | Admitting: ORTHOPAEDIC SURGERY
Payer: COMMERCIAL

## 2023-09-06 DIAGNOSIS — M65.311 TRIGGER THUMB, RIGHT THUMB: ICD-10-CM

## 2023-09-06 LAB
B-HCG UR QL: NEGATIVE
CTP QC/QA: YES

## 2023-09-06 PROCEDURE — 63600175 PHARM REV CODE 636 W HCPCS: Performed by: ORTHOPAEDIC SURGERY

## 2023-09-06 PROCEDURE — 81025 URINE PREGNANCY TEST: CPT | Performed by: ORTHOPAEDIC SURGERY

## 2023-09-06 PROCEDURE — 71000033 HC RECOVERY, INTIAL HOUR: Performed by: ORTHOPAEDIC SURGERY

## 2023-09-06 PROCEDURE — 37000008 HC ANESTHESIA 1ST 15 MINUTES: Performed by: ORTHOPAEDIC SURGERY

## 2023-09-06 PROCEDURE — 99900035 HC TECH TIME PER 15 MIN (STAT)

## 2023-09-06 PROCEDURE — D9220A PRA ANESTHESIA: Mod: CRNA,,, | Performed by: NURSE ANESTHETIST, CERTIFIED REGISTERED

## 2023-09-06 PROCEDURE — 25000003 PHARM REV CODE 250: Performed by: ORTHOPAEDIC SURGERY

## 2023-09-06 PROCEDURE — 63600175 PHARM REV CODE 636 W HCPCS: Performed by: NURSE ANESTHETIST, CERTIFIED REGISTERED

## 2023-09-06 PROCEDURE — 37000009 HC ANESTHESIA EA ADD 15 MINS: Performed by: ORTHOPAEDIC SURGERY

## 2023-09-06 PROCEDURE — 27201423 OPTIME MED/SURG SUP & DEVICES STERILE SUPPLY: Performed by: ORTHOPAEDIC SURGERY

## 2023-09-06 PROCEDURE — D9220A PRA ANESTHESIA: Mod: ANES,,, | Performed by: ANESTHESIOLOGY

## 2023-09-06 PROCEDURE — 36000706: Performed by: ORTHOPAEDIC SURGERY

## 2023-09-06 PROCEDURE — 94761 N-INVAS EAR/PLS OXIMETRY MLT: CPT

## 2023-09-06 PROCEDURE — 26055 INCISE FINGER TENDON SHEATH: CPT | Mod: F5,,, | Performed by: ORTHOPAEDIC SURGERY

## 2023-09-06 PROCEDURE — 36000707: Performed by: ORTHOPAEDIC SURGERY

## 2023-09-06 PROCEDURE — 25000003 PHARM REV CODE 250: Performed by: PHYSICIAN ASSISTANT

## 2023-09-06 PROCEDURE — 25000003 PHARM REV CODE 250: Performed by: NURSE ANESTHETIST, CERTIFIED REGISTERED

## 2023-09-06 PROCEDURE — D9220A PRA ANESTHESIA: ICD-10-PCS | Mod: ANES,,, | Performed by: ANESTHESIOLOGY

## 2023-09-06 PROCEDURE — D9220A PRA ANESTHESIA: ICD-10-PCS | Mod: CRNA,,, | Performed by: NURSE ANESTHETIST, CERTIFIED REGISTERED

## 2023-09-06 PROCEDURE — 26055 PR INCISE FINGER TENDON SHEATH: ICD-10-PCS | Mod: F5,,, | Performed by: ORTHOPAEDIC SURGERY

## 2023-09-06 PROCEDURE — 71000015 HC POSTOP RECOV 1ST HR: Performed by: ORTHOPAEDIC SURGERY

## 2023-09-06 RX ORDER — LIDOCAINE HYDROCHLORIDE 10 MG/ML
INJECTION INFILTRATION; PERINEURAL
Status: DISCONTINUED | OUTPATIENT
Start: 2023-09-06 | End: 2023-09-06 | Stop reason: HOSPADM

## 2023-09-06 RX ORDER — FENTANYL CITRATE 50 UG/ML
25 INJECTION, SOLUTION INTRAMUSCULAR; INTRAVENOUS EVERY 5 MIN PRN
Status: DISCONTINUED | OUTPATIENT
Start: 2023-09-06 | End: 2023-09-06 | Stop reason: HOSPADM

## 2023-09-06 RX ORDER — PROPOFOL 10 MG/ML
VIAL (ML) INTRAVENOUS
Status: DISCONTINUED | OUTPATIENT
Start: 2023-09-06 | End: 2023-09-06

## 2023-09-06 RX ORDER — DEXMEDETOMIDINE HYDROCHLORIDE 100 UG/ML
INJECTION, SOLUTION INTRAVENOUS
Status: DISCONTINUED | OUTPATIENT
Start: 2023-09-06 | End: 2023-09-06

## 2023-09-06 RX ORDER — ONDANSETRON 2 MG/ML
INJECTION INTRAMUSCULAR; INTRAVENOUS
Status: DISCONTINUED | OUTPATIENT
Start: 2023-09-06 | End: 2023-09-06

## 2023-09-06 RX ORDER — MIDAZOLAM HYDROCHLORIDE 1 MG/ML
INJECTION, SOLUTION INTRAMUSCULAR; INTRAVENOUS
Status: DISCONTINUED | OUTPATIENT
Start: 2023-09-06 | End: 2023-09-06

## 2023-09-06 RX ORDER — METHOCARBAMOL 500 MG/1
1000 TABLET, FILM COATED ORAL ONCE
Status: DISCONTINUED | OUTPATIENT
Start: 2023-09-06 | End: 2023-09-06 | Stop reason: HOSPADM

## 2023-09-06 RX ORDER — ACETAMINOPHEN 500 MG
1000 TABLET ORAL
Status: COMPLETED | OUTPATIENT
Start: 2023-09-06 | End: 2023-09-06

## 2023-09-06 RX ORDER — OXYCODONE HYDROCHLORIDE 5 MG/1
10 TABLET ORAL EVERY 4 HOURS PRN
Status: DISCONTINUED | OUTPATIENT
Start: 2023-09-06 | End: 2023-09-06 | Stop reason: HOSPADM

## 2023-09-06 RX ORDER — ONDANSETRON 4 MG/1
8 TABLET, ORALLY DISINTEGRATING ORAL EVERY 8 HOURS PRN
Status: DISCONTINUED | OUTPATIENT
Start: 2023-09-06 | End: 2023-09-06 | Stop reason: HOSPADM

## 2023-09-06 RX ORDER — MUPIROCIN 20 MG/G
OINTMENT TOPICAL
Status: DISCONTINUED | OUTPATIENT
Start: 2023-09-06 | End: 2023-09-06 | Stop reason: HOSPADM

## 2023-09-06 RX ORDER — CELECOXIB 200 MG/1
400 CAPSULE ORAL
Status: COMPLETED | OUTPATIENT
Start: 2023-09-06 | End: 2023-09-06

## 2023-09-06 RX ORDER — FENTANYL CITRATE 50 UG/ML
INJECTION, SOLUTION INTRAMUSCULAR; INTRAVENOUS
Status: DISCONTINUED | OUTPATIENT
Start: 2023-09-06 | End: 2023-09-06

## 2023-09-06 RX ORDER — DEXAMETHASONE SODIUM PHOSPHATE 4 MG/ML
INJECTION, SOLUTION INTRA-ARTICULAR; INTRALESIONAL; INTRAMUSCULAR; INTRAVENOUS; SOFT TISSUE
Status: DISCONTINUED | OUTPATIENT
Start: 2023-09-06 | End: 2023-09-06

## 2023-09-06 RX ORDER — HYDROMORPHONE HYDROCHLORIDE 1 MG/ML
0.5 INJECTION, SOLUTION INTRAMUSCULAR; INTRAVENOUS; SUBCUTANEOUS
Status: DISCONTINUED | OUTPATIENT
Start: 2023-09-06 | End: 2023-09-06 | Stop reason: HOSPADM

## 2023-09-06 RX ORDER — OXYCODONE AND ACETAMINOPHEN 5; 325 MG/1; MG/1
1 TABLET ORAL EVERY 4 HOURS PRN
Qty: 12 TABLET | Refills: 0 | Status: SHIPPED | OUTPATIENT
Start: 2023-09-06

## 2023-09-06 RX ORDER — PROPOFOL 10 MG/ML
VIAL (ML) INTRAVENOUS CONTINUOUS PRN
Status: DISCONTINUED | OUTPATIENT
Start: 2023-09-06 | End: 2023-09-06

## 2023-09-06 RX ORDER — SODIUM CHLORIDE 0.9 % (FLUSH) 0.9 %
10 SYRINGE (ML) INJECTION
Status: DISCONTINUED | OUTPATIENT
Start: 2023-09-06 | End: 2023-09-06 | Stop reason: HOSPADM

## 2023-09-06 RX ORDER — LIDOCAINE HYDROCHLORIDE 20 MG/ML
INJECTION INTRAVENOUS
Status: DISCONTINUED | OUTPATIENT
Start: 2023-09-06 | End: 2023-09-06

## 2023-09-06 RX ORDER — HALOPERIDOL 5 MG/ML
0.5 INJECTION INTRAMUSCULAR EVERY 10 MIN PRN
Status: DISCONTINUED | OUTPATIENT
Start: 2023-09-06 | End: 2023-09-06 | Stop reason: HOSPADM

## 2023-09-06 RX ADMIN — FENTANYL CITRATE 100 MCG: 50 INJECTION, SOLUTION INTRAMUSCULAR; INTRAVENOUS at 07:09

## 2023-09-06 RX ADMIN — SODIUM CHLORIDE: 9 INJECTION, SOLUTION INTRAVENOUS at 06:09

## 2023-09-06 RX ADMIN — DEXAMETHASONE SODIUM PHOSPHATE 4 MG: 4 INJECTION, SOLUTION INTRAMUSCULAR; INTRAVENOUS at 07:09

## 2023-09-06 RX ADMIN — PROPOFOL 50 MG: 10 INJECTION, EMULSION INTRAVENOUS at 07:09

## 2023-09-06 RX ADMIN — LIDOCAINE HYDROCHLORIDE 75 MG: 20 INJECTION INTRAVENOUS at 07:09

## 2023-09-06 RX ADMIN — ACETAMINOPHEN 1000 MG: 500 TABLET ORAL at 06:09

## 2023-09-06 RX ADMIN — OXYCODONE HYDROCHLORIDE 10 MG: 5 TABLET ORAL at 08:09

## 2023-09-06 RX ADMIN — MIDAZOLAM HYDROCHLORIDE 2 MG: 1 INJECTION, SOLUTION INTRAMUSCULAR; INTRAVENOUS at 06:09

## 2023-09-06 RX ADMIN — MUPIROCIN: 20 OINTMENT TOPICAL at 06:09

## 2023-09-06 RX ADMIN — CEFAZOLIN 2 G: 2 INJECTION, POWDER, FOR SOLUTION INTRAMUSCULAR; INTRAVENOUS at 07:09

## 2023-09-06 RX ADMIN — PROPOFOL 30 MG: 10 INJECTION, EMULSION INTRAVENOUS at 07:09

## 2023-09-06 RX ADMIN — CELECOXIB 400 MG: 200 CAPSULE ORAL at 06:09

## 2023-09-06 RX ADMIN — PROPOFOL 125 MCG/KG/MIN: 10 INJECTION, EMULSION INTRAVENOUS at 07:09

## 2023-09-06 RX ADMIN — DEXMEDETOMIDINE 12 MCG: 100 INJECTION, SOLUTION, CONCENTRATE INTRAVENOUS at 07:09

## 2023-09-06 RX ADMIN — ONDANSETRON 4 MG: 2 INJECTION INTRAMUSCULAR; INTRAVENOUS at 07:09

## 2023-09-06 NOTE — PLAN OF CARE
VSS.  Patient tolerating oral liquids without difficulty.   No apparent s&s of distress noted at this time, no complaints voiced at this time.   Discharge instructions reviewed with patient and significant other with good verbal feedback received.   Patient medicated with pain medication per orders for pain to right hand.  Post op medications delivered to bedside.  Patient ready for discharge.

## 2023-09-06 NOTE — ANESTHESIA PREPROCEDURE EVALUATION
09/06/2023  Gina Dickson is a 37 y.o., female.    Patient Active Problem List   Diagnosis    Uncontrolled hypertension    Depression with anxiety    Severe obesity (BMI 35.0-39.9) with comorbidity    Sickle-cell trait    Anxiety disorder    Depression, major, recurrent, moderateto severe    Grief    Thumb pain, right    Range of motion deficit    Swelling of right thumb     Past Surgical History:   Procedure Laterality Date    REPAIR OF COLLATERAL LIGAMENT OF THUMB Right 4/28/2023    Procedure: REPAIR, LIGAMENT, COLLATERAL, THUMB - right thumb UCL with internal brace;  Surgeon: Edvin Elias MD;  Location: [facility];  Service: Orthopedics;  Laterality: Right;    WISDOM TOOTH EXTRACTION Bilateral 2011       Pre-op Assessment    I have reviewed the Patient Summary Reports.     I have reviewed the Nursing Notes. I have reviewed the NPO Status.      Review of Systems      Physical Exam  General: Well nourished    Airway:  Mallampati: II   Mouth Opening: Normal  TM Distance: Normal  Tongue: Normal  Neck ROM: Normal ROM        Anesthesia Plan  Type of Anesthesia, risks & benefits discussed:    Anesthesia Type: Gen ETT, Gen Natural Airway, Regional  Intra-op Monitoring Plan: Standard ASA Monitors  Post Op Pain Control Plan: multimodal analgesia  Induction:  IV  Airway Plan: Direct  Informed Consent: Informed consent signed with the Patient and all parties understand the risks and agree with anesthesia plan.  All questions answered.   ASA Score: 2  Day of Surgery Review of History & Physical: H&P Update referred to the surgeon/provider.    Ready For Surgery From Anesthesia Perspective.     .

## 2023-09-06 NOTE — OP NOTE
DATE OF PROCEDURE:  09/06/2023     SERVICE:  Orthopedics.     SURGEON:  Edvin Elias M.D.     FIRST ASSISTANT:  SMA Melly     PREOPERATIVE DIAGNOSIS:  right trigger thumb     POSTOPERATIVE DIAGNOSIS:  right trigger thumb     PROCEDURE PERFORMED:  A1 pulley release of right thumb.     ANESTHESIA:  Local MAC.     ESTIMATED BLOOD LOSS:  1 mL.     SPECIMENS:  None.     IMPLANTS:  None.     COMPLICATIONS:  None.     INTRAVENOUS FLUIDS:  Crystalloid.     TOURNIQUET TIME:  7 minutes at 250mmHg.     INDICATIONS FOR PROCEDURE:  The patient is a 37-year-old female who   presented to the Orthopedics Clinic complaining of a significantly symptomatic   right trigger thumb.  The risks, benefits and alternatives to surgery were discussed with the patient in detail.  Specific risks discussed include but are not limited to bleeding, infection, vessel and/or nerve damage, pain, numbness, tingling, complex regional pain syndrome, compartment syndrome, failure to return to pre-injury and/or preoperative functional status, scar sensitivity, delayed healing, inability to return to work, pulley injury, tendon injury, bowstringing, partial and/or incomplete relief of symptoms, weakness, persistence of and/or worsening of symptoms, surgical failure, osteomyelitis, amputation, loss of function, stiffness, functional debility, dysfunction, decreased  strength, need for prolonged postoperative rehabilitation, need for further surgery, deep venous thrombosis, pulmonary embolism, arthritis and death.  The patient states an understanding and wishes to proceed with surgery.   All questions were answered.  No guarantees were implied or stated.  Written informed consent was obtained.       PROCEDURE IN DETAIL:  On the date of the operative intervention, the patient was   evaluated in the preoperative holding area.  With their  participation, the operative thumb was marked as the operative site.  The patient was then wheeled to the    Operating Room with the right  upper extremity placed on a hand table.  A   nonsterile tourniquet was placed on the patient's upper arm.  The extremity was prepped and draped in the usual sterile fashion.  A timeout   was taken to confirm the correct patient, site and procedure.  All were in   agreement, so I proceeded.  I utilized 1% plain lidocaine for local anesthesia   in the area overlying the right thumb A1 pulley.  After adequate   analgesia, an Esmarch was utilized to exsanguinate the extremity and   then tourniquet was insufflated to 250 mmHg where it remained for the duration   of the procedure.  I then marked out an approximately 1 cm incision overlying   the patient's A1 pulley of the operative thumb.  This incision was made   sharply with a scalpel and dissection was carried down through the skin and   subcutaneous tissues.  The neurovascular bundles were retracted both radially   and ulnarly and protected for the duration of the procedure.  Dissection was   carried down more deeply to the level of the A1 pulley.    This was identified and exposed and then a scalpel was utilized to enter the   flexor tendon sheath with a small poke hole in the pulley.  At this time,   scissor dissection was then utilized to complete both proximal and distal   division of the A1 pulley in its entirety.  After direct visual confirmation   that the pulley transection was complete, I then utilized a Ragnell retractor to   retract the flexor pollicis longus out of the wound, which did so without any   resistance and the thumb was taken through range of motion and noted to   glide smoothly without any evidence of further constriction.  At this time, the wound was then irrigated copiously   with sterile saline and closed with 4-0 nylon in horizontal mattress fashion.    Sterile soft dressings were then applied.  The tourniquet was then   deflated and brisk capillary refill ensued throughout the patient's hand,   specifically  to the operative thumb.  The patient was then awakened from anesthesia   and returned to the Postanesthesia Care Unit in stable condition.  There were no   complications.  As the attending surgeon, I was present and performed the   critical portion of the procedure.     POSTOPERATIVE PLAN FOR THE PATIENT:  The patient will be discharged home in   stable condition.  The patient will be seen back in clinic in approximately 2 weeks for   suture removal and reevaluation of the postoperative plan.  Range of motion as tolerated without restrictions.  Should there be any stiffness, occupational therapy will be recommended and ordered at that time.

## 2023-09-06 NOTE — ANESTHESIA POSTPROCEDURE EVALUATION
Anesthesia Post Evaluation    Patient: Gina Dickson    Procedure(s) Performed: Procedure(s) (LRB):  RELEASE, TRIGGER FINGER - right thumb (Right)    Final Anesthesia Type: general      Patient location during evaluation: PACU  Patient participation: Yes- Able to Participate  Level of consciousness: awake and alert and oriented  Pain management: adequate  Airway patency: patent    PONV status at discharge: No PONV  Anesthetic complications: no      Cardiovascular status: blood pressure returned to baseline and hemodynamically stable  Respiratory status: unassisted  Hydration status: euvolemic  Follow-up not needed.          Vitals Value Taken Time   /86 09/06/23 0737   Temp 36.1 °C (97 °F) 09/06/23 0733   Pulse 59 09/06/23 0815   Resp 20 09/06/23 0839   SpO2 89 % 09/06/23 0815   Vitals shown include unvalidated device data.      Event Time   Out of Recovery 08:03:00         Pain/Adam Score: Pain Rating Prior to Med Admin: 7 (9/6/2023  8:39 AM)  Adam Score: 10 (9/6/2023  7:33 AM)

## 2023-09-06 NOTE — TRANSFER OF CARE
"Anesthesia Transfer of Care Note    Patient: Gina Dickson    Procedure(s) Performed: Procedure(s) (LRB):  RELEASE, TRIGGER FINGER - right thumb (Right)    Patient location: PACU    Anesthesia Type: general    Transport from OR: Transported from OR on room air with adequate spontaneous ventilation    Post pain: adequate analgesia    Post assessment: no apparent anesthetic complications and tolerated procedure well    Post vital signs: stable    Level of consciousness: awake    Nausea/Vomiting: no nausea/vomiting    Complications: none    Transfer of care protocol was followed      Last vitals:   Visit Vitals  BP (!) 146/98 (BP Location: Left arm, Patient Position: Lying)   Pulse (!) 57   Temp 36.7 °C (98 °F) (Oral)   Resp 18   Ht 5' 2" (1.575 m)   Wt 86.6 kg (191 lb)   LMP 08/21/2023 (Exact Date)   SpO2 100%   Breastfeeding No   BMI 34.93 kg/m²     "

## 2023-09-06 NOTE — BRIEF OP NOTE
[facility] - [facility] ([facility])  Brief Operative Note    Surgery Date: 9/6/2023     Surgeon(s) and Role:     * Edvin Elias MD - Primary    Assisting Surgeon: None    Pre-op Diagnosis:  Trigger thumb, right thumb [M65.311]    Post-op Diagnosis:  Post-Op Diagnosis Codes:     * Trigger thumb, right thumb [M65.311]    Procedure(s) (LRB):  RELEASE, TRIGGER FINGER - right thumb (Right)    Anesthesia: Local MAC    Operative Findings: see op note    Estimated Blood Loss: * No values recorded between 9/6/2023  7:20 AM and 9/6/2023  7:31 AM *         Specimens:   Specimen (24h ago, onward)      None              Discharge Note    OUTCOME: Patient tolerated treatment/procedure well without complication and is now ready for discharge.    DISPOSITION: Home or Self Care    FINAL DIAGNOSIS:  <principal problem not specified>    FOLLOWUP: In clinic    DISCHARGE INSTRUCTIONS:    Discharge Procedure Orders   Diet general     Leave dressing on - Keep it clean, dry, and intact until clinic visit     Call MD for:  temperature >100.4     Call MD for:  persistent nausea and vomiting     Call MD for:  severe uncontrolled pain     Call MD for:  difficulty breathing, headache or visual disturbances     Call MD for:  redness, tenderness, or signs of infection (pain, swelling, redness, odor or green/yellow discharge around incision site)     Call MD for:  hives     Call MD for:  persistent dizziness or light-headedness     Call MD for:  extreme fatigue

## 2023-09-06 NOTE — PLAN OF CARE
Pre Op checklist complete pt resting in bed with call light in reach. All questions answered. Pt belongings at bedside and plan to place them in a locker. All piercing removed per patient including vaginal, tongue, nose. S/O brought to bedside. Pt still needs site yasir/anes consent/updated.

## 2023-09-07 ENCOUNTER — PATIENT MESSAGE (OUTPATIENT)
Dept: OBSTETRICS AND GYNECOLOGY | Facility: CLINIC | Age: 38
End: 2023-09-07
Payer: COMMERCIAL

## 2023-09-07 VITALS
WEIGHT: 191 LBS | SYSTOLIC BLOOD PRESSURE: 156 MMHG | OXYGEN SATURATION: 100 % | HEART RATE: 58 BPM | DIASTOLIC BLOOD PRESSURE: 86 MMHG | BODY MASS INDEX: 35.15 KG/M2 | HEIGHT: 62 IN | RESPIRATION RATE: 20 BRPM | TEMPERATURE: 97 F

## 2023-09-08 RX ORDER — FLUCONAZOLE 150 MG/1
150 TABLET ORAL ONCE
Qty: 2 TABLET | Refills: 0 | Status: SHIPPED | OUTPATIENT
Start: 2023-09-08 | End: 2023-09-08

## 2023-09-08 NOTE — TELEPHONE ENCOUNTER
Contact made with patient.  She reports she is having itching and irritation with a thick clumpy discharge.  Believes she has a yeast infection.  Diflucan sent to pharmacy.  Pt instructed to follow up if no improvement after the weekend. All questions answered.

## 2023-09-11 PROBLEM — M54.16 LUMBAR RADICULOPATHY: Status: ACTIVE | Noted: 2023-09-11

## 2023-09-13 ENCOUNTER — PATIENT MESSAGE (OUTPATIENT)
Dept: PSYCHIATRY | Facility: CLINIC | Age: 38
End: 2023-09-13

## 2023-09-13 ENCOUNTER — OFFICE VISIT (OUTPATIENT)
Dept: PSYCHIATRY | Facility: CLINIC | Age: 38
End: 2023-09-13
Payer: COMMERCIAL

## 2023-09-13 ENCOUNTER — PATIENT MESSAGE (OUTPATIENT)
Dept: ORTHOPEDICS | Facility: CLINIC | Age: 38
End: 2023-09-13
Payer: COMMERCIAL

## 2023-09-13 DIAGNOSIS — F41.0 PANIC ATTACKS: ICD-10-CM

## 2023-09-13 DIAGNOSIS — F43.21 GRIEF: ICD-10-CM

## 2023-09-13 DIAGNOSIS — F33.1 DEPRESSION, MAJOR, RECURRENT, MODERATE: Primary | ICD-10-CM

## 2023-09-13 DIAGNOSIS — F41.9 ANXIETY DISORDER, UNSPECIFIED TYPE: ICD-10-CM

## 2023-09-13 PROCEDURE — 99499 NO LOS: ICD-10-PCS | Mod: 95,,, | Performed by: PSYCHIATRY & NEUROLOGY

## 2023-09-13 PROCEDURE — 99499 UNLISTED E&M SERVICE: CPT | Mod: 95,,, | Performed by: PSYCHIATRY & NEUROLOGY

## 2023-09-13 NOTE — PATIENT INSTRUCTIONS
Pt did not check in until 1:31p (tho scheduled for 1pm.    No Level of Service as I was involved with next scheduled pt case.    I will ask scheduling to reach out and reschedule and also ask pt to reschedule as well.     D Post MD

## 2023-09-13 NOTE — PROGRESS NOTES
Pt did not check in until 1:31p (tho scheduled for 1pm.    No Level of Service as I was involved with next scheduled pt case.    I will ask scheduling to reach out and reschedule    D Post MD

## 2023-09-14 ENCOUNTER — PATIENT MESSAGE (OUTPATIENT)
Dept: PSYCHIATRY | Facility: CLINIC | Age: 38
End: 2023-09-14

## 2023-09-14 ENCOUNTER — PROCEDURE VISIT (OUTPATIENT)
Dept: OBSTETRICS AND GYNECOLOGY | Facility: CLINIC | Age: 38
End: 2023-09-14
Attending: STUDENT IN AN ORGANIZED HEALTH CARE EDUCATION/TRAINING PROGRAM
Payer: COMMERCIAL

## 2023-09-14 VITALS
BODY MASS INDEX: 34.85 KG/M2 | WEIGHT: 189.38 LBS | HEIGHT: 62 IN | DIASTOLIC BLOOD PRESSURE: 90 MMHG | SYSTOLIC BLOOD PRESSURE: 130 MMHG

## 2023-09-14 DIAGNOSIS — Z00.00 HEALTHCARE MAINTENANCE: ICD-10-CM

## 2023-09-14 DIAGNOSIS — R87.810 CERVICAL HIGH RISK HPV (HUMAN PAPILLOMAVIRUS) TEST POSITIVE: Primary | ICD-10-CM

## 2023-09-14 LAB
B-HCG UR QL: NEGATIVE
CTP QC/QA: YES

## 2023-09-14 PROCEDURE — 88342 CHG IMMUNOCYTOCHEMISTRY: ICD-10-PCS | Mod: 26,,, | Performed by: PATHOLOGY

## 2023-09-14 PROCEDURE — 88341 IMHCHEM/IMCYTCHM EA ADD ANTB: CPT | Performed by: PATHOLOGY

## 2023-09-14 PROCEDURE — 88341 IMHCHEM/IMCYTCHM EA ADD ANTB: CPT | Mod: 26,,, | Performed by: PATHOLOGY

## 2023-09-14 PROCEDURE — 88305 TISSUE EXAM BY PATHOLOGIST: ICD-10-PCS | Mod: 26,,, | Performed by: PATHOLOGY

## 2023-09-14 PROCEDURE — 57454 COLPOSCOPY: ICD-10-PCS | Mod: S$GLB,,, | Performed by: STUDENT IN AN ORGANIZED HEALTH CARE EDUCATION/TRAINING PROGRAM

## 2023-09-14 PROCEDURE — 99499 NO LOS: ICD-10-PCS | Mod: S$GLB,,, | Performed by: STUDENT IN AN ORGANIZED HEALTH CARE EDUCATION/TRAINING PROGRAM

## 2023-09-14 PROCEDURE — 81025 POCT URINE PREGNANCY: ICD-10-PCS | Mod: S$GLB,,, | Performed by: STUDENT IN AN ORGANIZED HEALTH CARE EDUCATION/TRAINING PROGRAM

## 2023-09-14 PROCEDURE — 88341 PR IHC OR ICC EACH ADD'L SINGLE ANTIBODY  STAINPR: ICD-10-PCS | Mod: 26,,, | Performed by: PATHOLOGY

## 2023-09-14 PROCEDURE — 81025 URINE PREGNANCY TEST: CPT | Mod: S$GLB,,, | Performed by: STUDENT IN AN ORGANIZED HEALTH CARE EDUCATION/TRAINING PROGRAM

## 2023-09-14 PROCEDURE — 99499 UNLISTED E&M SERVICE: CPT | Mod: S$GLB,,, | Performed by: STUDENT IN AN ORGANIZED HEALTH CARE EDUCATION/TRAINING PROGRAM

## 2023-09-14 PROCEDURE — 88342 IMHCHEM/IMCYTCHM 1ST ANTB: CPT | Performed by: PATHOLOGY

## 2023-09-14 PROCEDURE — 57454 BX/CURETT OF CERVIX W/SCOPE: CPT | Mod: S$GLB,,, | Performed by: STUDENT IN AN ORGANIZED HEALTH CARE EDUCATION/TRAINING PROGRAM

## 2023-09-14 PROCEDURE — 88342 IMHCHEM/IMCYTCHM 1ST ANTB: CPT | Mod: 26,,, | Performed by: PATHOLOGY

## 2023-09-14 PROCEDURE — 88305 TISSUE EXAM BY PATHOLOGIST: CPT | Mod: 59 | Performed by: PATHOLOGY

## 2023-09-14 PROCEDURE — 88305 TISSUE EXAM BY PATHOLOGIST: CPT | Mod: 26,,, | Performed by: PATHOLOGY

## 2023-09-14 NOTE — PROCEDURES
Colposcopy    Date/Time: 9/14/2023 10:15 AM    Performed by: Anita Quijano MD  Authorized by: Anita Quijano MD    Consent Done?:  Yes (Written)  Timeout:Immediately prior to procedure a time out was called to verify the correct patient, procedure, equipment, support staff and site/side marked as required  Assistants?: No      Colposcopy Site:  Cervix  Position:  Supine  Acrowhite Lesion? Yes    Atypical Vessels: No    Transformation Zone Adequate?: Yes    Biopsy?: Yes         Location:  Cervix ((9 00))  ECC Performed?: Yes    LEEP Performed?: No    Estimated blood loss (cc):  2   Patient tolerated the procedure well with no immediate complications.   Post-operative instructions were provided for the patient.   Patient was discharged and will follow up if any complications occur       Indication: NILM +[id number]    Pelvic: NEFG, vagina pink and rugated, physiologic discharge, no blood in vault, cervix smooth and pink with no gross lesions, biopsy site hemostatic with Monsel's    Instructed to use ibuprofen PRN pain  Follow up pending pathology  Bleeding, pain, fever precautions provided

## 2023-09-18 ENCOUNTER — OFFICE VISIT (OUTPATIENT)
Dept: PSYCHIATRY | Facility: CLINIC | Age: 38
End: 2023-09-18
Payer: COMMERCIAL

## 2023-09-18 VITALS
WEIGHT: 196.31 LBS | SYSTOLIC BLOOD PRESSURE: 144 MMHG | HEART RATE: 64 BPM | DIASTOLIC BLOOD PRESSURE: 84 MMHG | BODY MASS INDEX: 35.91 KG/M2

## 2023-09-18 DIAGNOSIS — F41.9 ANXIETY DISORDER, UNSPECIFIED TYPE: ICD-10-CM

## 2023-09-18 DIAGNOSIS — F43.21 GRIEF: ICD-10-CM

## 2023-09-18 DIAGNOSIS — F41.0 PANIC ATTACKS: ICD-10-CM

## 2023-09-18 DIAGNOSIS — F33.1 DEPRESSION, MAJOR, RECURRENT, MODERATE: Primary | ICD-10-CM

## 2023-09-18 PROCEDURE — 3008F PR BODY MASS INDEX (BMI) DOCUMENTED: ICD-10-PCS | Mod: CPTII,S$GLB,, | Performed by: PSYCHIATRY & NEUROLOGY

## 2023-09-18 PROCEDURE — 99215 OFFICE O/P EST HI 40 MIN: CPT | Mod: S$GLB,,, | Performed by: PSYCHIATRY & NEUROLOGY

## 2023-09-18 PROCEDURE — 3079F DIAST BP 80-89 MM HG: CPT | Mod: CPTII,S$GLB,, | Performed by: PSYCHIATRY & NEUROLOGY

## 2023-09-18 PROCEDURE — 99999 PR PBB SHADOW E&M-EST. PATIENT-LVL II: CPT | Mod: PBBFAC,,, | Performed by: PSYCHIATRY & NEUROLOGY

## 2023-09-18 PROCEDURE — 3079F PR MOST RECENT DIASTOLIC BLOOD PRESSURE 80-89 MM HG: ICD-10-PCS | Mod: CPTII,S$GLB,, | Performed by: PSYCHIATRY & NEUROLOGY

## 2023-09-18 PROCEDURE — 3077F PR MOST RECENT SYSTOLIC BLOOD PRESSURE >= 140 MM HG: ICD-10-PCS | Mod: CPTII,S$GLB,, | Performed by: PSYCHIATRY & NEUROLOGY

## 2023-09-18 PROCEDURE — 99215 PR OFFICE/OUTPT VISIT, EST, LEVL V, 40-54 MIN: ICD-10-PCS | Mod: S$GLB,,, | Performed by: PSYCHIATRY & NEUROLOGY

## 2023-09-18 PROCEDURE — 3077F SYST BP >= 140 MM HG: CPT | Mod: CPTII,S$GLB,, | Performed by: PSYCHIATRY & NEUROLOGY

## 2023-09-18 PROCEDURE — 3008F BODY MASS INDEX DOCD: CPT | Mod: CPTII,S$GLB,, | Performed by: PSYCHIATRY & NEUROLOGY

## 2023-09-18 PROCEDURE — 99999 PR PBB SHADOW E&M-EST. PATIENT-LVL II: ICD-10-PCS | Mod: PBBFAC,,, | Performed by: PSYCHIATRY & NEUROLOGY

## 2023-09-18 RX ORDER — ALPRAZOLAM 0.5 MG/1
.25-.5 TABLET ORAL 2 TIMES DAILY PRN
Qty: 60 TABLET | Refills: 2 | Status: SHIPPED | OUTPATIENT
Start: 2023-09-18 | End: 2023-09-18

## 2023-09-18 RX ORDER — BUPROPION HYDROCHLORIDE 300 MG/1
300 TABLET ORAL DAILY
Qty: 90 TABLET | Refills: 0 | Status: SHIPPED | OUTPATIENT
Start: 2023-09-18 | End: 2023-10-25 | Stop reason: SDUPTHER

## 2023-09-18 RX ORDER — ALPRAZOLAM 0.5 MG/1
.25-.5 TABLET ORAL 2 TIMES DAILY PRN
Qty: 45 TABLET | Refills: 1 | Status: SHIPPED | OUTPATIENT
Start: 2023-09-18 | End: 2023-10-25 | Stop reason: SDUPTHER

## 2023-09-18 NOTE — PATIENT INSTRUCTIONS
PLAN:     Follow Up Oct 19 2023 @ 8:30a    Informs that has 1st appt on 8-15-23 with counselor Rl CERVANTESW  [facility]     Meds renew as prior     Have reviewed  discussed therpay principles grief coping skills and gave info www.griefshare.org     References:     Relaxation stress reduction workbook: ANA Mandujano PhD ( used: $7-10)    Feeling Good Website: Francisco Javier Juarez MD / www.YEVVO website (free) / jerry. PODCASTS    Anxiety &  phobia workbook by ALEXEI Seth PhD  (web retailers: used: $ 7-10)    [place]: Path to Better Sleep : https://www.veterantraining.va.gov/insomnia/ (free)       Pt expressed appreciation for the visit today and did not have further question at this time though pt  was still informed to:     Call  if problems.    Call / Report Side Effects to Psyc MD     Encouraged to follow up with primary care / Gen Med MD for continued monitoring of general health and wellness.  Y aug 30  2023

## 2023-09-18 NOTE — PROGRESS NOTES
Gina Dickson   1985 09/18/2023     Disclaimer: Evaluation and treatment is based on information presented to date. Any new information may affect assessment and findings.     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 female  initially presented to ps April 4 2023  as referred by [employer] PCP Christa Santoro MD     Patient states that she has a long-term relationship she has 2 boys 16 years old 6 years old 2 different    Says her father is a Orthodox  mother has been .    Was started on Xanax 0.5 mg #30 for limited use  by PCP March 2023 / prior to that it had been 2 to 3 yrs since was on Xanax;  gave her reports of stress and the inciting event with psychiatry she did advanced to number 60 when seen in April 2023; though now as September 23 discussion is place to move her down to number 45 plan for even further decrease understanding expressed of plan    She had been offered IOP though declined she did do some work with a counselor that there was some financial challenges there and is as of September 23 planning to move  another counselor    She does have a long-term boyfriend   who works off [employer]     >>    S: Patient's Own Perception of Condition (& Side Effects) : no side effects     O:      CURRENT PRESENTATION:     Doing better / smiling / says has been exercising and eating better ; asks to return to work 9-25-23; letter composed in session; copy given to her and copy handed to Nimisha Duque RN to fax to [employer] employee health    Generally she is up beat neatly dressed telling me she is getting up walking exercising going to the gym and getting her children off to school says she is looking forward to returning to  "work    8-15-23 told me that she had scheduled 1st appt with counselor Rl Walker [facility]  [facility] ; now 2023  tells me that it was too expensive ; says they  adjusted her bill saying they under billed and she owed additional funds; says   has already identified another counselor she is going to soon work with     She also  was reminded about the relaxation and stress reduction workbook and anxiety phobia workbook that I previously  recommended. 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   Understanding Expressed    Psyc Meds: Likes advance of Wellbutrin XL from 150 mg to 300 mg  / also takes some intermittent Xanax 0.5 mg BID prn signif anxiety ; has some intermittent panic attack ; today 23 discussed with her I will renew at #45 vs #60 noting that she had  been restarted by her primary care in 2023 prior to that she would not been on for 2 to 3 years ;  yet she did restart when dealing with the grief spring of 2023    I explained that  optimally would o move back to # 30 and eventually would like to see her off other than rare  intermittent rare use.  Understanding Expressed    Constitutional Health Concerns: no acute concerns     rx pain med for such / tho taking  only rarely     Wt Readings from Last 3 Encounters:   23 89 kg (196 lb 5.1 oz)   23 85.9 kg (189 lb 6 oz)   23 86.6 kg (191 lb)      " Laboratory Data  Procedure visit on 09/14/2023   Component Date Value Ref Range Status    POC Preg Test, Ur 09/14/2023 Negative  Negative Final     Acceptable 09/14/2023 Yes   Final   Admission on 09/06/2023, Discharged on 09/06/2023   Component Date Value Ref Range Status    POC Preg Test, Ur 09/06/2023 Negative  Negative Final     Acceptable 09/06/2023 Yes   Final        Mental Status Exam:      Appearance: casual   Oriented: x 3   Attitude: cooperative   Eye Contact: good  Behavior: calm smiling at times     Mood: ok / better  Cognition: alert  Concentration: grossly intact   Affect: appropriate range      Anxiety: mild / mod      Thought Process: goal directed     Speech:       Volume : WNL       Quantity WNL       Quality: appears to openly answer questions      Threats: no SI / no HI     Psychosis: denies all      Estimate of Intellectual Function: average   Impulse Control: no thoughts of harm to self/ others      Musculoskeletal: no tremor     Social: has many supports / family friends     Patient Active Problem List   Diagnosis    Uncontrolled hypertension    Depression with anxiety    Severe obesity (BMI 35.0-39.9) with comorbidity    Sickle-cell trait    Anxiety disorder    Depression, major, recurrent, moderateto severe    Grief    Thumb pain, right    Range of motion deficit    Swelling of right thumb    Lumbar radiculopathy    Panic attacks          Current Outpatient Medications:     ALPRAZolam (XANAX) 0.5 MG tablet, Take 0.5-1 tablets (0.25-0.5 mg total) by mouth 2 (two) times daily as needed (SIGNIFICANT ANXIETY)., Disp: 45 tablet, Rfl: 1    amLODIPine (NORVASC) 10 MG tablet, Take 1 tablet (10 mg total) by mouth once daily., Disp: 90 tablet, Rfl: 0    buPROPion (WELLBUTRIN XL) 300 MG 24 hr tablet, Take 1 tablet (300 mg total) by mouth once daily., Disp: 90 tablet, Rfl: 0    butalbital-acetaminophen-caffeine -40 mg (FIORICET, ESGIC) -40 mg per tablet, Take 1  tablet by mouth every 6 (six) hours as needed. Label with sedative precautions, Disp: 20 tablet, Rfl: 0    cetirizine (ZYRTEC) 10 MG tablet, Take 1 tablet (10 mg total) by mouth every evening. for 10 days, Disp: 10 tablet, Rfl: 0    chlorthalidone (HYGROTEN) 25 MG Tab, Take 1 tablet (25 mg total) by mouth once daily., Disp: 30 tablet, Rfl: 2    cloNIDine (CATAPRES) 0.1 MG tablet, Take 1 tablet (0.1 mg total) by mouth once daily., Disp: 60 tablet, Rfl: 0    ibuprofen (ADVIL,MOTRIN) 600 MG tablet, Take 1 tablet (600 mg total) by mouth 3 (three) times daily., Disp: 90 tablet, Rfl: 0    lactic acid-citric-potassium (PHEXXI) 1.8-1-0.4 % Gel, Place 1 applicator vaginally 1 (one) time if needed (prior to sexual intercourse)., Disp: 60 g, Rfl: 3    metoprolol succinate (TOPROL-XL) 50 MG 24 hr tablet, Take 1 tablet (50 mg total) by mouth once daily., Disp: 90 tablet, Rfl: 0    oxyCODONE-acetaminophen (PERCOCET) 5-325 mg per tablet, Take 1 tablet by mouth every 4 (four) hours as needed for Pain., Disp: 12 tablet, Rfl: 0     Social History     Tobacco Use   Smoking Status Former    Types: Cigarettes   Smokeless Tobacco Never        Review of patient's allergies indicates:   Allergen Reactions    Norco [hydrocodone-acetaminophen] Itching    Tramadol Itching        ASSESSMENT:   Encounter Diagnoses   Name Primary?    Depression, major, recurrent, sept 2023 improving / in april 2023 had been moderate to severe after brother shot  killed  Yes    Grief: brother: shot killed March 2023      Anxiety disorder, unspecified type     Panic attacks          Patient Instructions       PLAN:     Follow Up Oct 19 2023 @ 8:30a    Informs that has 1st appt on 8-15-23 with counselor Rl Walker [facility]  [facility]     Meds renew as prior     Have reviewed  discussed therpay principles grief coping skills and gave info www.griefshare.org     References:     Relaxation stress reduction workbook: ANA Mandujano PhD ( used:  $7-10)    Feeling Good Website: Francisco Javier Juarez MD / [email or web address].com website (free) / jerry. PODCASTS    Anxiety &  phobia workbook by ALEXEI Seth PhD  (web retailers: used: $ 7-10)    [place]: Path to Better Sleep : https://www.veterantraining.va.gov/insomnia/ (free)       Pt expressed appreciation for the visit today and did not have further question at this time though pt  was still informed to:     Call  if problems.    Call / Report Side Effects to Psmalik ROSSI     Encouraged to follow up with primary care / Gen Med MD for continued monitoring of general health and wellness.  Y aug 30  2023      >>Background  from Initial Psyc MD eval April 4 2023:    depression / anxiety / grief as brother shot killed / mar 20 2023)     Gina Dickson a 37 y.o. 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     Dr. Santoro gave her 10 days' worth Xanax 0.5 mg 3 times a day number 30 / again such was in addition to the Celexa which the patient said she only took for a few days. She did not like present she has not counseling    Brought outpatient programming but she says she does care for group. (We talked about of her  here which a referral was made today).    By my review of [place] prescription monitoring patient has not had any history of benzo within many years.     Excerpt of 3-22-23 note from: [facility] PCP Christa Santoro MD      Encourage patient to comply with Celexa daily for a minimum of 4-6 weeks.  She is to schedule for appointments with behavior health for counseling.  Completed forms for FMLA for 3 months.  Short-term supply of Xanax prescribed, number 30 tablets.  Patient  "aware that this will not be prescribed long-term.     >>   took week not like way feel; celex / nothing over decade     Prior Behavioral health: Patient has not seen mental health many years ago she saw someone but does not call only other contact she is has been the events primary care here at [facility] the patient; no history suicide      Patient initially presents as smiling happy affect seen in my office asked about circumstances clinic gets tearful talks about her 40-year-old brother Josue Garcia  who she states was shot kill some type dry by she does note that the brother had just gotten state Corrections after 30 years or so.     Says that the brother resumed to be going through  school     Patient states had other losses within the last couple years. She states 20 20 dad's youngest sister passed in  2 cousins  was an overdose on meditation 1 was said to be a murder in 2021  Self Rating Scales: (Such submitted for scanning) :      Quick Inventory of Depression (QID-Short Form): 16  generalized anxiety disorder scale: 17   The [employer] Framework: a "bio-psycho-social" screening form for use as clinical raw data. / (submitted for scanning)         Physical Abuse: N     Sexual abuse N     Other Trauma?:     Psychosis: N     Substance Use:     Alcohol: occasionally 2 glasses wine     Cannabis n  Tobacco:former 19 to 27y / not daily / 2-3 days / few cigaretes     Cocaine:n  Benzo:by Rx  Opioid: n  Ecstasy:n  Other:n     PAST PSYCHIATRIC HISTORY:      Inpatient: n  Outpatient:  Dr ana de jesus  "

## 2023-09-19 ENCOUNTER — OFFICE VISIT (OUTPATIENT)
Dept: ORTHOPEDICS | Facility: CLINIC | Age: 38
End: 2023-09-19
Payer: COMMERCIAL

## 2023-09-19 DIAGNOSIS — Z98.890 POSTOPERATIVE STATE: ICD-10-CM

## 2023-09-19 DIAGNOSIS — M65.311 TRIGGER THUMB, RIGHT THUMB: Primary | ICD-10-CM

## 2023-09-19 PROCEDURE — 1159F PR MEDICATION LIST DOCUMENTED IN MEDICAL RECORD: ICD-10-PCS | Mod: CPTII,S$GLB,, | Performed by: PHYSICIAN ASSISTANT

## 2023-09-19 PROCEDURE — 99999 PR PBB SHADOW E&M-EST. PATIENT-LVL III: ICD-10-PCS | Mod: PBBFAC,,, | Performed by: PHYSICIAN ASSISTANT

## 2023-09-19 PROCEDURE — 99024 POSTOP FOLLOW-UP VISIT: CPT | Mod: S$GLB,,, | Performed by: PHYSICIAN ASSISTANT

## 2023-09-19 PROCEDURE — 1159F MED LIST DOCD IN RCRD: CPT | Mod: CPTII,S$GLB,, | Performed by: PHYSICIAN ASSISTANT

## 2023-09-19 PROCEDURE — 99999 PR PBB SHADOW E&M-EST. PATIENT-LVL III: CPT | Mod: PBBFAC,,, | Performed by: PHYSICIAN ASSISTANT

## 2023-09-19 PROCEDURE — 99024 PR POST-OP FOLLOW-UP VISIT: ICD-10-PCS | Mod: S$GLB,,, | Performed by: PHYSICIAN ASSISTANT

## 2023-09-19 NOTE — PROGRESS NOTES
Dr. Elias is the supervising physician for this encounter/patient    Gina Dickson presents for post-operative evaluation.  The patient is now 2 weeks s/p Right trigger thumb release with Dr. Elias on 9/6/23.  Overall the patient reports doing well.  She reports 7/10 pain off/on, denies any f/c/s, no numbness.    PE:    AA&O x 4.  NAD  HEENT:  NCAT, sclera nonicteric  Lungs:  Respirations are equal and unlabored.  CV:  2+ bilateral upper and lower extremity pulses.  MSK: The wound is healing well with no signs of erythema or warmth.  There is no drainage.  No clinical signs or symptoms of infection are present.  IP active motion intact, however not full. SILT. DNVI.    A/P: Status post above, doing well  1) Continue with activity as tolerated. She will message me when she feels like she can do OT if needed.  2) All sutures removed today. Wound care and signs of infection discussed. Scar massage discussed.  3) F/U as needed.  4) Call with any questions/concerns in the interim      Jamie Russo-DiGeorge PA-C

## 2023-09-20 ENCOUNTER — OFFICE VISIT (OUTPATIENT)
Dept: SPORTS MEDICINE | Facility: CLINIC | Age: 38
End: 2023-09-20
Payer: COMMERCIAL

## 2023-09-20 VITALS
HEIGHT: 62 IN | WEIGHT: 189.63 LBS | BODY MASS INDEX: 34.89 KG/M2 | DIASTOLIC BLOOD PRESSURE: 96 MMHG | SYSTOLIC BLOOD PRESSURE: 149 MMHG | HEART RATE: 68 BPM

## 2023-09-20 DIAGNOSIS — M54.16 LUMBAR RADICULOPATHY: Primary | ICD-10-CM

## 2023-09-20 DIAGNOSIS — M25.562 LEFT KNEE PAIN, UNSPECIFIED CHRONICITY: ICD-10-CM

## 2023-09-20 DIAGNOSIS — M72.2 PLANTAR FASCIITIS: ICD-10-CM

## 2023-09-20 LAB
COMMENT: NORMAL
FINAL PATHOLOGIC DIAGNOSIS: NORMAL
GROSS: NORMAL
Lab: NORMAL

## 2023-09-20 PROCEDURE — 1159F MED LIST DOCD IN RCRD: CPT | Mod: CPTII,S$GLB,, | Performed by: ORTHOPAEDIC SURGERY

## 2023-09-20 PROCEDURE — 3008F BODY MASS INDEX DOCD: CPT | Mod: CPTII,S$GLB,, | Performed by: ORTHOPAEDIC SURGERY

## 2023-09-20 PROCEDURE — 99999 PR PBB SHADOW E&M-EST. PATIENT-LVL III: ICD-10-PCS | Mod: PBBFAC,,, | Performed by: ORTHOPAEDIC SURGERY

## 2023-09-20 PROCEDURE — 3077F PR MOST RECENT SYSTOLIC BLOOD PRESSURE >= 140 MM HG: ICD-10-PCS | Mod: CPTII,S$GLB,, | Performed by: ORTHOPAEDIC SURGERY

## 2023-09-20 PROCEDURE — 99214 OFFICE O/P EST MOD 30 MIN: CPT | Mod: S$GLB,,, | Performed by: ORTHOPAEDIC SURGERY

## 2023-09-20 PROCEDURE — 3077F SYST BP >= 140 MM HG: CPT | Mod: CPTII,S$GLB,, | Performed by: ORTHOPAEDIC SURGERY

## 2023-09-20 PROCEDURE — 99999 PR PBB SHADOW E&M-EST. PATIENT-LVL III: CPT | Mod: PBBFAC,,, | Performed by: ORTHOPAEDIC SURGERY

## 2023-09-20 PROCEDURE — 3080F DIAST BP >= 90 MM HG: CPT | Mod: CPTII,S$GLB,, | Performed by: ORTHOPAEDIC SURGERY

## 2023-09-20 PROCEDURE — 1159F PR MEDICATION LIST DOCUMENTED IN MEDICAL RECORD: ICD-10-PCS | Mod: CPTII,S$GLB,, | Performed by: ORTHOPAEDIC SURGERY

## 2023-09-20 PROCEDURE — 99214 PR OFFICE/OUTPT VISIT, EST, LEVL IV, 30-39 MIN: ICD-10-PCS | Mod: S$GLB,,, | Performed by: ORTHOPAEDIC SURGERY

## 2023-09-20 PROCEDURE — 3008F PR BODY MASS INDEX (BMI) DOCUMENTED: ICD-10-PCS | Mod: CPTII,S$GLB,, | Performed by: ORTHOPAEDIC SURGERY

## 2023-09-20 PROCEDURE — 3080F PR MOST RECENT DIASTOLIC BLOOD PRESSURE >= 90 MM HG: ICD-10-PCS | Mod: CPTII,S$GLB,, | Performed by: ORTHOPAEDIC SURGERY

## 2023-09-20 RX ORDER — CELECOXIB 200 MG/1
200 CAPSULE ORAL DAILY
Qty: 30 CAPSULE | Refills: 0 | Status: SHIPPED | OUTPATIENT
Start: 2023-09-20

## 2023-09-20 NOTE — PROGRESS NOTES
ESTABLISHED PATIENT    History 9/20/2023:  Brent returns to clinic today for follow-up of left knee pain/lumbar radiculopathy.  She states the Medrol Dosepak helped tremendously.  But she is starting to have more pain at the bottom of her foot.  She states the pain is on the area around the heel and the plantar side of the foot.  She states it is getting much worse.  She is still trying to workout but is having a lot of trouble walking.    History 8/7/2023:   37 y.o. 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 She does not recall a specific LALO in regards to her knee. She has most pain in the anterior knee. She has a radiating pain from the medial side of her knee to her foot and from the lateral side up to the back. She has pain while sitting and will lean more to her right side.        - mechanical symptoms, - instability    Is affecting ADLs.  Pain is 7/10 at it's worst.     REVIEW OF SYSTEMS   Constitution: Negative. Negative for chills, fever and night sweats.   HENT: Negative for congestion and headaches.    Eyes: Negative for blurred vision, left vision loss and right vision loss.   Cardiovascular: Negative for chest pain and syncope.   Respiratory: Negative for cough and shortness of breath.    Endocrine: Negative for polydipsia, polyphagia and polyuria.   Hematologic/Lymphatic: Negative for bleeding problem. Does not bruise/bleed easily.   Skin: Negative for dry skin, itching and rash.   Musculoskeletal: Negative for falls. Positive for left knee pain  Gastrointestinal: Negative for abdominal pain and bowel incontinence.   Genitourinary: Negative for bladder incontinence and nocturia.   Neurological: Negative for disturbances in coordination, loss of balance and seizures.   Psychiatric/Behavioral:  "Negative for depression. The patient does not have insomnia.    Allergic/Immunologic: Negative for hives and persistent infections.     PHYSICAL EXAMINATION    Vitals: BP (!) 149/96   Pulse 68   Ht 5' 2" (1.575 m)   Wt 86 kg (189 lb 9.5 oz)   LMP 08/21/2023 (Exact Date)   BMI 34.68 kg/m²     General: The patient appears active and healthy with no apparent physical problems.  No disturbance of mood or affect is demonstrated. Alert and Oriented.    HEENT: Eyes normal, pupils equally round, nose normal.    Resp: Equal and symmetrical chest rises. No wheezing    CV: Regular rate    Neck: Supple; nonpainful range of motion.    Extremities: no cyanosis, clubbing, edema, or diffuse swelling.  Palpable pulses, good capillary refill of the digits.  No coolness, discoloration, edema or obvious varicosities.    Skin: no lesions noted.    Lymphatic: no detected adenopathy in the upper or lower extremities.    Neurologic: normal mental status, normal reflexes, normal gait and balance.  Patient is alert and oriented to person, place and time.  No flaccidity or spasticity is noted.  No motor or sensory deficits are noted.  Light touch is intact    Orthopaedic:     KNEE EXAM - LEFT    Inspection:   Normal skin color and appearance with no scars, no ecchymosis and no effusion.      ROM:   0° - 145°.      Palpation:   There is no tenderness along medial joint line, medial plica, medial collateral ligament, pes bursa, lateral joint line, iliotibial band, lateral collateral ligament, popliteal fossa, patellar tendon, or quadriceps tendon.    Stability: - anterior drawer, - Lachman, - pivot shift and - posterior drawer.      No instability with varus or valgus stress at 0° or 30°. Negative dial  test at 30° and 90°.    Tests:   - Edenilsons test.  - patellar compression - grind test, - crepitus.  There is no patellar apprehension.     - J Sign. - Debbie's. - patellar tilt. - Ally. Lateral patella translation  2 quadrants. Medial " patella translation 2 quadrants    Motor:   Quadriceps strength is 5/5 and hamstrings strength is 5/5. Hamstrings show no tightness.      Neuro:   Distal neurovascular status is normal    Vascular: Negative Homans and no palpable popliteal cords. 2+ pedal pulse with brisk cap refill    Gait Normal    THORACIC/LUMBAR SPINE     Inspection   Normal skin color and appearance, no ecchymosis, no swelling, and no scars.  No increased lumbar lordosis. Pelvis is level without tilt.  No scoliosis.      ROM   Full forward flexion, extension, side bending and rotation.  There is no increased pain with ROM testing.      Palpation     There is no tenderness of the spinous processes, iliac crests, posterior superior iliac spines, sacroiliac joints, sacrum, coccyx, and greater trochanters.      Neuro   Straight leg raise is - on the left     L4 neurologic testing reveals 5/5 strength in TA , hyperreflexive knee reflex, and normal sensation in L4 dermatome.      L5 neurologic testing reveals 5/5 strength in EHL and normal sensation in L5 dermatome.      S1 neurologic testing reveals 5/5 strength in peroneals, 2/2 achilles tendon reflex, and normal sensation in S1 dermatome.    Foot Exam - left    Inspection: No swelling, no erythema, no bruising.  There is no increased warmth and no signs of  ulcerations or open wounds.  Medial arch is normal.  Transverse arch is normal.      Palpation: Tender over the plantar fascia    Motor: 5/5 EHL, 5/5 FHL, 5/5 anterior tibialis, 5/5 posterior tibialis, 5/5 gastrocsoleus and 5/5 peroneals.      Neuro: Sensory exam shows no decreased sensation to light touch in sural, saphenous, deep peroneal, superficial peroneal, or tibial nerve distribution.      Vascular: Pedal pulses are palpable with brisk capillary refill of the digits.          IMPRESSION       ICD-10-CM ICD-9-CM   1. Lumbar radiculopathy  M54.16 724.4   2. Left knee pain, unspecified chronicity  M25.562 719.46   3. Plantar fasciitis   M72.2 728.71         MEDICATIONS PRESCRIBED      Celebrex 200mg    RECOMMENDATIONS     Plantar fascia night splint given to the patient today  RTC in 1 month with Sivakumar Lanier PA-C  I recommended a slant board to work on calf flexibility  Icing  Home stretching program was given to the patient to stretch the plantar fascia      All questions were answered, pt will contact us for questions or concerns in the interim.

## 2023-09-21 DIAGNOSIS — M72.2 PLANTAR FASCIITIS: Primary | ICD-10-CM

## 2023-10-18 ENCOUNTER — PATIENT MESSAGE (OUTPATIENT)
Dept: SPORTS MEDICINE | Facility: CLINIC | Age: 38
End: 2023-10-18
Payer: COMMERCIAL

## 2023-10-20 ENCOUNTER — PATIENT MESSAGE (OUTPATIENT)
Dept: PSYCHIATRY | Facility: CLINIC | Age: 38
End: 2023-10-20
Payer: COMMERCIAL

## 2023-10-23 ENCOUNTER — OFFICE VISIT (OUTPATIENT)
Dept: SPORTS MEDICINE | Facility: CLINIC | Age: 38
End: 2023-10-23
Payer: COMMERCIAL

## 2023-10-23 VITALS
WEIGHT: 194.31 LBS | SYSTOLIC BLOOD PRESSURE: 132 MMHG | BODY MASS INDEX: 35.54 KG/M2 | HEART RATE: 66 BPM | DIASTOLIC BLOOD PRESSURE: 88 MMHG

## 2023-10-23 DIAGNOSIS — M72.2 PLANTAR FASCIITIS: ICD-10-CM

## 2023-10-23 DIAGNOSIS — M54.16 LUMBAR RADICULOPATHY: Primary | ICD-10-CM

## 2023-10-23 PROCEDURE — 99214 PR OFFICE/OUTPT VISIT, EST, LEVL IV, 30-39 MIN: ICD-10-PCS | Mod: S$GLB,,, | Performed by: ORTHOPAEDIC SURGERY

## 2023-10-23 PROCEDURE — 3079F DIAST BP 80-89 MM HG: CPT | Mod: CPTII,S$GLB,, | Performed by: ORTHOPAEDIC SURGERY

## 2023-10-23 PROCEDURE — 99999 PR PBB SHADOW E&M-EST. PATIENT-LVL III: ICD-10-PCS | Mod: PBBFAC,,, | Performed by: ORTHOPAEDIC SURGERY

## 2023-10-23 PROCEDURE — 1159F PR MEDICATION LIST DOCUMENTED IN MEDICAL RECORD: ICD-10-PCS | Mod: CPTII,S$GLB,, | Performed by: ORTHOPAEDIC SURGERY

## 2023-10-23 PROCEDURE — 3075F PR MOST RECENT SYSTOLIC BLOOD PRESS GE 130-139MM HG: ICD-10-PCS | Mod: CPTII,S$GLB,, | Performed by: ORTHOPAEDIC SURGERY

## 2023-10-23 PROCEDURE — 99214 OFFICE O/P EST MOD 30 MIN: CPT | Mod: S$GLB,,, | Performed by: ORTHOPAEDIC SURGERY

## 2023-10-23 PROCEDURE — 1159F MED LIST DOCD IN RCRD: CPT | Mod: CPTII,S$GLB,, | Performed by: ORTHOPAEDIC SURGERY

## 2023-10-23 PROCEDURE — 3079F PR MOST RECENT DIASTOLIC BLOOD PRESSURE 80-89 MM HG: ICD-10-PCS | Mod: CPTII,S$GLB,, | Performed by: ORTHOPAEDIC SURGERY

## 2023-10-23 PROCEDURE — 3008F PR BODY MASS INDEX (BMI) DOCUMENTED: ICD-10-PCS | Mod: CPTII,S$GLB,, | Performed by: ORTHOPAEDIC SURGERY

## 2023-10-23 PROCEDURE — 3008F BODY MASS INDEX DOCD: CPT | Mod: CPTII,S$GLB,, | Performed by: ORTHOPAEDIC SURGERY

## 2023-10-23 PROCEDURE — 3075F SYST BP GE 130 - 139MM HG: CPT | Mod: CPTII,S$GLB,, | Performed by: ORTHOPAEDIC SURGERY

## 2023-10-23 PROCEDURE — 99999 PR PBB SHADOW E&M-EST. PATIENT-LVL III: CPT | Mod: PBBFAC,,, | Performed by: ORTHOPAEDIC SURGERY

## 2023-10-23 NOTE — PROGRESS NOTES
ESTABLISHED PATIENT    History 10/23/2023:  Brent returns to clinic today for follow-up of left knee pain/lumbar radiculopathy.    History 9/20/2023:  Brent returns to clinic today for follow-up of left knee pain/lumbar radiculopathy.  She states the Medrol Dosepak helped tremendously.  But she is starting to have more pain at the bottom of her foot.  She states the pain is on the area around the heel and the plantar side of the foot.  She states it is getting much worse.  She is still trying to workout but is having a lot of trouble walking.    History 8/7/2023:   37 y.o. 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 She does not recall a specific LALO in regards to her knee. She has most pain in the anterior knee. She has a radiating pain from the medial side of her knee to her foot and from the lateral side up to the back. She has pain while sitting and will lean more to her right side.        - mechanical symptoms, - instability    Is affecting ADLs.  Pain is 6/10 at it's worst.     REVIEW OF SYSTEMS   Constitution: Negative. Negative for chills, fever and night sweats.   HENT: Negative for congestion and headaches.    Eyes: Negative for blurred vision, left vision loss and right vision loss.   Cardiovascular: Negative for chest pain and syncope.   Respiratory: Negative for cough and shortness of breath.    Endocrine: Negative for polydipsia, polyphagia and polyuria.   Hematologic/Lymphatic: Negative for bleeding problem. Does not bruise/bleed easily.   Skin: Negative for dry skin, itching and rash.   Musculoskeletal: Negative for falls. Positive for left knee pain  Gastrointestinal: Negative for abdominal pain and bowel incontinence.   Genitourinary: Negative for bladder incontinence and nocturia.    Neurological: Negative for disturbances in coordination, loss of balance and seizures.   Psychiatric/Behavioral: Negative for depression. The patient does not have insomnia.    Allergic/Immunologic: Negative for hives and persistent infections.     PHYSICAL EXAMINATION    Vitals: /88   Pulse 66   Wt 88.2 kg (194 lb 5.4 oz)   BMI 35.54 kg/m²     General: The patient appears active and healthy with no apparent physical problems.  No disturbance of mood or affect is demonstrated. Alert and Oriented.    HEENT: Eyes normal, pupils equally round, nose normal.    Resp: Equal and symmetrical chest rises. No wheezing    CV: Regular rate    Neck: Supple; nonpainful range of motion.    Extremities: no cyanosis, clubbing, edema, or diffuse swelling.  Palpable pulses, good capillary refill of the digits.  No coolness, discoloration, edema or obvious varicosities.    Skin: no lesions noted.    Lymphatic: no detected adenopathy in the upper or lower extremities.    Neurologic: normal mental status, normal reflexes, normal gait and balance.  Patient is alert and oriented to person, place and time.  No flaccidity or spasticity is noted.  No motor or sensory deficits are noted.  Light touch is intact    Orthopaedic:     KNEE EXAM - LEFT    Inspection:   Normal skin color and appearance with no scars, no ecchymosis and no effusion.      ROM:   0° - 145°.      Palpation:   There is no tenderness along medial joint line, medial plica, medial collateral ligament, pes bursa, lateral joint line, iliotibial band, lateral collateral ligament, popliteal fossa, patellar tendon, or quadriceps tendon.    Stability: - anterior drawer, - Lachman, - pivot shift and - posterior drawer.      No instability with varus or valgus stress at 0° or 30°. Negative dial  test at 30° and 90°.    Tests:   - Edenilsons test.  - patellar compression - grind test, - crepitus.  There is no patellar apprehension.     - J Sign. - Debbie's. - patellar tilt.  - Ally. Lateral patella translation  2 quadrants. Medial patella translation 2 quadrants    Motor:   Quadriceps strength is 5/5 and hamstrings strength is 5/5. Hamstrings show no tightness.      Neuro:   Distal neurovascular status is normal    Vascular: Negative Homans and no palpable popliteal cords. 2+ pedal pulse with brisk cap refill    Gait Normal    THORACIC/LUMBAR SPINE     Inspection   Normal skin color and appearance, no ecchymosis, no swelling, and no scars.  No increased lumbar lordosis. Pelvis is level without tilt.  No scoliosis.      ROM   Full forward flexion, extension, side bending and rotation.  There is no increased pain with ROM testing.      Palpation     There is no tenderness of the spinous processes, iliac crests, posterior superior iliac spines, sacroiliac joints, sacrum, coccyx, and greater trochanters.      Neuro   Straight leg raise is - on the left     L4 neurologic testing reveals 5/5 strength in TA , hyperreflexive knee reflex, and normal sensation in L4 dermatome.      L5 neurologic testing reveals 5/5 strength in EHL and normal sensation in L5 dermatome.      S1 neurologic testing reveals 5/5 strength in peroneals, 2/2 achilles tendon reflex, and normal sensation in S1 dermatome.    Foot Exam - left    Inspection: No swelling, no erythema, no bruising.  There is no increased warmth and no signs of  ulcerations or open wounds.  Medial arch is normal.  Transverse arch is normal.      Palpation: Tender over the plantar fascia    Motor: 5/5 EHL, 5/5 FHL, 5/5 anterior tibialis, 5/5 posterior tibialis, 5/5 gastrocsoleus and 5/5 peroneals.      Neuro: Sensory exam shows no decreased sensation to light touch in sural, saphenous, deep peroneal, superficial peroneal, or tibial nerve distribution.      Vascular: Pedal pulses are palpable with brisk capillary refill of the digits.    IMPRESSION       ICD-10-CM ICD-9-CM   1. Lumbar radiculopathy  M54.16 724.4   2. Plantar fasciitis  M72.2 728.71        MEDICATIONS PRESCRIBED      None    RECOMMENDATIONS     New referral to physical therapy placed today  Patient given night splint for plantar fascia  I recommended either PRP or corticosteroid injections if her symptoms remain persistent.  She wants to hold off at this point.  RTC PRN      All questions were answered, pt will contact us for questions or concerns in the interim.

## 2023-10-25 ENCOUNTER — OFFICE VISIT (OUTPATIENT)
Dept: PSYCHIATRY | Facility: CLINIC | Age: 38
End: 2023-10-25
Payer: COMMERCIAL

## 2023-10-25 ENCOUNTER — PATIENT MESSAGE (OUTPATIENT)
Dept: PSYCHIATRY | Facility: CLINIC | Age: 38
End: 2023-10-25

## 2023-10-25 DIAGNOSIS — F43.21 GRIEF: ICD-10-CM

## 2023-10-25 DIAGNOSIS — F41.9 ANXIETY DISORDER, UNSPECIFIED TYPE: ICD-10-CM

## 2023-10-25 DIAGNOSIS — F41.0 PANIC ATTACKS: ICD-10-CM

## 2023-10-25 DIAGNOSIS — F33.1 DEPRESSION, MAJOR, RECURRENT, MODERATE: Primary | ICD-10-CM

## 2023-10-25 PROCEDURE — 99214 OFFICE O/P EST MOD 30 MIN: CPT | Mod: 95,,, | Performed by: PSYCHIATRY & NEUROLOGY

## 2023-10-25 PROCEDURE — 99214 PR OFFICE/OUTPT VISIT, EST, LEVL IV, 30-39 MIN: ICD-10-PCS | Mod: 95,,, | Performed by: PSYCHIATRY & NEUROLOGY

## 2023-10-25 RX ORDER — BUPROPION HYDROCHLORIDE 300 MG/1
300 TABLET ORAL DAILY
Qty: 90 TABLET | Refills: 1 | Status: SHIPPED | OUTPATIENT
Start: 2023-10-25 | End: 2024-04-22

## 2023-10-25 RX ORDER — ALPRAZOLAM 0.5 MG/1
.25-.5 TABLET ORAL 2 TIMES DAILY PRN
Qty: 30 TABLET | Refills: 1 | Status: SHIPPED | OUTPATIENT
Start: 2023-10-25 | End: 2023-12-24

## 2023-10-25 NOTE — PATIENT INSTRUCTIONS
PLAN:     Follow Up Jan 9 2024 @ 12:30p IN CLINIC    Encouraged to establish with counselor:    Pt may call [facility] at [phone] and requests counselor;  pt was informed that there may be significant  wait times involved.    As such patient was also told of alternatives such as:  1) contacting their  insurance carrier for a list of counselors  And / or  2) may explore website [employer]: [email or web address].Show [email or web address]     Meds renew as prior tho decreased #  30 of xanax  0.5 mg (1/2 to 1 tab) s needed signif anxiety / intermittent use; optimally taper off in future     Have reviewed  discussed therpay principles grief coping skills and gave info www.griefshare.org     References:     Relaxation stress reduction workbook: ANA Mandujano PhD ( used: $7-10)    Feeling Good Website: Francisco Javier Juarez MD / www.Soshowise website (free) / jerry. PODCASTS    Anxiety &  phobia workbook by ALEXEI Seth PhD  (web retailers: used: $ 7-10)    [place]: Path to Better Sleep : https://www.veterantraining.[place].gov[place]insomnia/ (free)       Pt expressed appreciation for the visit today and did not have further question at this time though pt  was still informed to:     Call  if problems.    Call / Report Side Effects to Psmalik ROSSI     Encouraged to follow up with primary care / Gen Med MD for continued monitoring of general health and wellness.  Y aug 30  2023

## 2023-10-25 NOTE — PROGRESS NOTES
Gina Antionelissette Yessi   1985   10/25/2023     Disclaimer: Evaluation and treatment is based on information presented to date. Any new information may affect assessment and findings.     The patient location is: Patient's home  and reported  that his/her location at the time of this visit was in the [facility]     Visit type: Virtual visit with synchronous audio and video     Each patient to whom he or she provides medical services by telemedicine is: (1) informed of the relationship between the physician and patient and the respective role of any other health care provider with respect to management of the patient; and (2) notified that he or she may decline to receive medical services by telemedicine and may withdraw from such care at any time.    Patient was informed that I am a physician who is licensed in the [facility]:  Francisco Javier Elizabeth MD:  Employed by   [employer]     If technology issues arise: MARIA E Elizabeth MD will attempt to call pt back     Pt informed that if he / she is ever in crisis (or has acute concerns): pt is instructed to Dial 911 or go to nearest Emergency Room (ER)    Pt informed that if questions related to privacy practices: pt is instructed to contact [facility] Information Department:     Brief Background:    Pt works with [employer] 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 female  initially presented to psyc April 4 2023  as referred by [facility] PCP Christa Santoro MD     Patient states that she has a long-term relationship she has 2 boys 16 years old 6 years old 2 different    Says her father is a Rastafari  mother has been .    Was started on Xanax 0.5 mg #30 for limited use  by PCP March 2023 / prior to that it had been 2 to 3 yrs since was on Xanax;  gave her  "reports of stress and the inciting event with psychiatry she did advanced to number 60 when seen in 2023; though now as  discussion is place to move her down to number 45 plan for even further decrease understanding expressed of plan    She had been offered IOP though declined she did do some work with a counselor that there was some financial challenges there and is as of  planning to move  another counselor    She does have a long-term boyfriend   who works off shore     >>      S: Patient's Own Perception of Condition (& Side Effects) : no side effects / no using Xanax on regulkar basis / just occasionally / intermittently / says has enough    O:      CURRENT PRESENTATION:     says feeling better / 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[employer]    Saw in clinic last session late 2023 ; said (then) that was doing better / smiling / says has been exercising and eating better ; letter composed in session;  to return to work 23;; says she did return to work    She is up beat,  says feeling better    Has not yet found counsleor ; did try with  [facility]  counselor [facility] ; leslie said was too expensive of co pay;     In Spet said she identified another counselor // tho did not work out /    Nonetheless I did encourage her to be peristent to locate counselor    Says son going to counselor and accompanies him ;l tho I noted that is not same as "counselor for her"    She also  was reminded about the relaxation and stress reduction workbook and anxiety phobia workbook that I previously  recommended. 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   Understanding Expressed    Psyc Meds: Likes advance of Wellbutrin XL from 150 mg to 300 mg  / also takes some intermittent Xanax 0.5 mg BID prn signif anxiety ; has some intermittent panic attack ; on 9-19-23 discussed with her I will renew at #45 vs #60 noting that she had  been restarted by her primary care in March 2023 prior to that she would not been on for 2 to 3 years ;  yet she did restart when dealing with the grief spring of 2023    Today moved back to # 30 and eventually would like to see her off other than rare  intermittent rare use.     Constitutional Health Concerns: no acute concerns     rx pain med for such / tho taking  only rarely     Wt Readings from Last 3 Encounters:   10/23/23 88.2 kg (194 lb 5.4 oz)   09/20/23 86 kg (189 lb 9.5 oz)   09/18/23 89 kg (196 lb 5.1 oz)      Laboratory Data  Date/Time Component Value Flag Lab Status   08/24/22 1023 HDL 44 -- Final result   08/24/22 1023 CHOL 182 -- Final result   08/24/22 1023 TRIG 58 -- Final result   08/24/22 1023 LDLCALC 126.4 -- Final result   08/24/22 1023 CHOLHDL 24.2 -- Final result   08/24/22 1023 NONHDLCHOL 138 -- Final result   08/24/22 1023 TOTALCHOLEST 4.1 -- Final result   08/24/22 1023 HGBA1C 4.6 -- Final result   04/24/16 1242 COLORU Yellow -- Final result   04/24/16 1242 APPEARANCEUA Clear -- Final result   04/24/16 1242 SPECGRAV 1.020 -- Final result   04/24/16 1242 PHUR 6.0 -- Final result   04/24/16 1242 KETONESU Negative -- Final result   04/24/16 1242 OCCULTUA Negative -- Final result   04/24/16 1242 NITRITE Negative -- Final result   04/24/16 1242 UROBILINOGEN 2.0-3.0 Important  Abnormal Final result   09/14/23 1106 PREGNANCYTES Negative -- Final result   07/22/21 5496 RAPSCRN  Negative -- Final result   04/24/16 1242 LEUKOCYTESUR Negative -- Final result   04/30/23 1438 WBC 8.60 -- Final result   04/30/23 1438 RBC 5.10 -- Final result   04/30/23 1438 HGB 14.3 -- Final result   04/30/23 1438 HCT 40.3 -- Final result   04/30/23 1438 MCH 28.0 -- Final result   04/30/23 1438 RDW 14.2 -- Final result   04/30/23 1438  -- Final result   04/30/23 1438 MPV 9.5 -- Final result   04/30/23 1438 GLU 89 -- Final result   04/30/23 1438 BUN 13 -- Final result   04/30/23 1438 CREATININE 0.9 -- Final result   04/30/23 1438 CALCIUM 9.4 -- Final result   04/30/23 1438  -- Final result   04/30/23 1438 K 4.0 -- Final result   04/30/23 1438  -- Final result   04/30/23 1438 PROT 7.6 -- Final result   04/30/23 1438 ALBUMIN 4.2 -- Final result   04/30/23 1438 BILITOT 0.6 -- Final result   04/30/23 1438 AST 15 -- Final result   04/30/23 1438 ALKPHOS 83 -- Final result   04/30/23 1438 CO2 22 Important  Low Final result   04/30/23 1438 ALT 12 -- Final result   04/30/23 1438 ANIONGAP 10 -- Final result   05/09/16 1425 EGFRNONAA >60 -- Final result   05/09/16 1425 ESTGFRAFRICA >60 -- Final result   05/10/16 1729 MG 4.7 Important  High Final result   04/24/16 1317  -- Final result   04/30/23 1438 MCV 79 Important  Low Final result        Mental Status Exam:      Appearance: casual   Oriented: x 3   Attitude: cooperative   Eye Contact: good  Behavior: calm smiling at times     Mood: ok / better  Cognition: alert  Concentration: grossly intact   Affect: appropriate range      Anxiety: mild / mod      Thought Process: goal directed     Speech:       Volume : WNL       Quantity WNL       Quality: appears to openly answer questions      Threats: no SI / no HI     Psychosis: denies all      Estimate of Intellectual Function: average   Impulse Control: no thoughts of harm to self/ others      Musculoskeletal: no tremor     Social: has many supports / family friends     Patient Active Problem List    Diagnosis    Uncontrolled hypertension    Depression with anxiety    Severe obesity (BMI 35.0-39.9) with comorbidity    Sickle-cell trait    Anxiety disorder    Depression, major, recurrent, moderateto severe    Grief     Thumb pain, right    Range of motion deficit    Swelling of right thumb    Lumbar radiculopathy    Panic attacks          Current Outpatient Medications:     ALPRAZolam (XANAX) 0.5 MG tablet, Take 0.5-1 tablets (0.25-0.5 mg total) by mouth 2 (two) times daily as needed (SIGNIFICANT ANXIETY)., Disp: 30 tablet, Rfl: 1    amLODIPine (NORVASC) 10 MG tablet, Take 1 tablet (10 mg total) by mouth once daily., Disp: 90 tablet, Rfl: 0    buPROPion (WELLBUTRIN XL) 300 MG 24 hr tablet, Take 1 tablet (300 mg total) by mouth once daily., Disp: 90 tablet, Rfl: 1    butalbital-acetaminophen-caffeine -40 mg (FIORICET, ESGIC) -40 mg per tablet, Take 1 tablet by mouth every 6 (six) hours as needed. Label with sedative precautions, Disp: 20 tablet, Rfl: 0    celecoxib (CELEBREX) 200 MG capsule, Take 1 capsule (200 mg total) by mouth once daily., Disp: 30 capsule, Rfl: 0    cetirizine (ZYRTEC) 10 MG tablet, Take 1 tablet (10 mg total) by mouth every evening. for 10 days, Disp: 10 tablet, Rfl: 0    chlorthalidone (HYGROTEN) 25 MG Tab, Take 1 tablet (25 mg total) by mouth once daily., Disp: 30 tablet, Rfl: 2    cloNIDine (CATAPRES) 0.1 MG tablet, Take 1 tablet (0.1 mg total) by mouth once daily., Disp: 60 tablet, Rfl: 0    ibuprofen (ADVIL,MOTRIN) 600 MG tablet, Take 1 tablet (600 mg total) by mouth 3 (three) times daily., Disp: 90 tablet, Rfl: 0    lactic acid-citric-potassium (PHEXXI) 1.8-1-0.4 % Gel, Place 1 applicator vaginally 1 (one) time if needed (prior to sexual intercourse)., Disp: 60 g, Rfl: 3    metoprolol succinate (TOPROL-XL) 50 MG 24 hr tablet, Take 1 tablet (50 mg total) by mouth once daily., Disp: 90 tablet, Rfl: 0    oxyCODONE-acetaminophen (PERCOCET) 5-325 mg per tablet, Take 1 tablet by  mouth every 4 (four) hours as needed for Pain., Disp: 12 tablet, Rfl: 0     Social History     Tobacco Use   Smoking Status Former    Types: Cigarettes   Smokeless Tobacco Never        Review of patient's allergies indicates:   Allergen Reactions    Norco [hydrocodone-acetaminophen] Itching    Tramadol Itching        ASSESSMENT:   Encounter Diagnoses   Name Primary?    Depression, major, recurrent, sept 2023 improving / in april 2023 had been moderate to severe after brother shot  killed Yes    Grief: brother: shot killed March 2023      Anxiety disorder, unspecified type     Panic attacks          Patient Instructions       PLAN:     Follow Up Jan 9 2024 @ 12:30p IN CLINIC    Encouraged to establish with counselor:    Pt may call [facility] at [phone] and requests counselor;  pt was informed that there may be significant  wait times involved.    As such patient was also told of alternatives such as:  1) contacting their  insurance carrier for a list of counselors  And / or  2) may explore website Psychology Today: [email or web address]     Meds renew as prior tho decreased #  30 of xanax  0.5 mg (1/2 to 1 tab) s needed signif anxiety / intermittent use; optimally taper off in future     Have reviewed  discussed therpay principles grief coping skills and gave info www.griefshare.org     References:     Relaxation stress reduction workbook: ANA Mandujano PhD ( used: $7-10)    Feeling Good Website: Francisco Javier Juarez MD / [email or web address] website (free) / jerry. PODCASTS    Anxiety &  phobia workbook by ALEXEI Seth PhD  (web retailers: used: $ 7-10)    [place]: Path to Better Sleep : https://www.veterantraining.va.gov/insomnia/ (free)       Pt expressed appreciation for the visit today and did not have further question at this time though pt  was still informed to:     Call  if problems.    Call / Report Side Effects to Psmalik ROSSI     Encouraged to follow up with primary care / Gen Med MD for  continued monitoring of general health and wellness.  Y aug 30  2023      >>Background  from Initial Psyc MD leung April 4 2023:    depression / anxiety / grief as brother shot killed / mar 20 2023)     Gina Dickson a 37 y.o. 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     Dr. Santoro gave her 10 days' worth Xanax 0.5 mg 3 times a day number 30 / again such was in addition to the Celexa which the patient said she only took for a few days. She did not like present she has not counseling    Brought outpatient programming but she says she does care for group. (We talked about of her  here which a referral was made today).    By my review of [place] prescription monitoring patient has not had any history of benzo within many years.     Excerpt of 3-22-23 note from[facility]      Encourage patient to comply with Celexa daily for a minimum of 4-6 weeks.  She is to schedule for appointments with behavior health for counseling.  Completed forms for FMLA for 3 months.  Short-term supply of Xanax prescribed, number 30 tablets.  Patient aware that this will not be prescribed long-term.     >>   took week not like way feel; celex / nothing over decade     Prior Behavioral health: Patient has not seen mental health many years ago she saw someone but does not call only other contact she is has been the events primary care [facility] at [facility] the patient; no history suicide      Patient initially presents as smiling happy affect seen in my office asked about circumstances clinic gets tearful talks about her 40-year-old brother Josue Garcia  who she  "states was shot kill some type dry by she does note that the brother had just gotten state Corrections after 30 years or so.     Says that the brother resumed to be going through  school     Patient states had other losses within the last couple years. She states 20 20 dad's youngest sister passed in  2 cousins  was an overdose on meditation 1 was said to be a murder in 2021  Self Rating Scales: (Such submitted for scanning) :      Quick Inventory of Depression (QID-Short Form): 16  generalized anxiety disorder scale: 17   The [employer] Framework: a "bio-psycho-social" screening form for use as clinical raw data. / (submitted for scanning)         Physical Abuse: N     Sexual abuse N     Other Trauma?:     Psychosis: N     Substance Use:     Alcohol: occasionally 2 glasses wine     Cannabis n  Tobacco:former 19 to 27y / not daily / 2-3 days / few cigaretes     Cocaine:n  Benzo:by Rx  Opioid: n  Ecstasy:n  Other:n     PAST PSYCHIATRIC HISTORY:      Inpatient: n  Outpatient:  Dr ana de jesus  "

## 2023-11-17 ENCOUNTER — PATIENT MESSAGE (OUTPATIENT)
Dept: ADMINISTRATIVE | Facility: HOSPITAL | Age: 38
End: 2023-11-17
Payer: COMMERCIAL

## 2023-11-22 ENCOUNTER — PATIENT MESSAGE (OUTPATIENT)
Dept: SPORTS MEDICINE | Facility: CLINIC | Age: 38
End: 2023-11-22
Payer: COMMERCIAL

## 2023-11-29 DIAGNOSIS — M72.2 PLANTAR FASCIITIS: Primary | ICD-10-CM

## 2023-11-29 DIAGNOSIS — M54.16 LUMBAR RADICULOPATHY: ICD-10-CM

## 2023-11-29 RX ORDER — MELOXICAM 15 MG/1
15 TABLET ORAL DAILY
Qty: 30 TABLET | Refills: 0 | Status: SHIPPED | OUTPATIENT
Start: 2023-11-29

## 2024-04-03 ENCOUNTER — PATIENT MESSAGE (OUTPATIENT)
Dept: ADMINISTRATIVE | Facility: HOSPITAL | Age: 39
End: 2024-04-03
Payer: COMMERCIAL

## 2024-04-16 NOTE — PROGRESS NOTES
Dr. Elias is the supervising physician for this encounter/patient    Gina Dickson presents for post-operative evaluation.  The patient is now 10 days s/p Right thumb UCL repair with internal brace with Dr. Elias on 4/28/23.  Overall the patient reports doing well.  She reports 3-4/10 pain but it will increase to 8/10 at times. She denies any f/c/s. She does have some numbness just distal to the incision only. She is not scheduled with OT yet.    PE:    AA&O x 4.  NAD  HEENT:  NCAT, sclera nonicteric  Lungs:  Respirations are equal and unlabored.  CV:  2+ bilateral upper and lower extremity pulses.  MSK: The wound is healing well with no signs of erythema or warmth.  There is no drainage.  No clinical signs or symptoms of infection are present.  IP motion intact, MCP motion not tested today. SILT but diminished just distal to the incision only. DNVI.    A/P: Status post above, doing well  1) Continue with OT for custom orthosis and postop rehab. She does not tolerate the thumb spica brace today, but does tolerate the thumb moddaber brace. Wear full time until OT can make custom orthosis.  2) All sutures removed today. Wound care and signs of infection discussed.  3) F/U 4 weeks  4) Call with any questions/concerns in the interim      Jamie Russo-DiGeorge PA-C     <<-----Click here for Discharge Medication Review

## 2024-05-03 ENCOUNTER — TELEPHONE (OUTPATIENT)
Dept: PRIMARY CARE CLINIC | Facility: CLINIC | Age: 39
End: 2024-05-03
Payer: COMMERCIAL

## 2024-05-03 NOTE — TELEPHONE ENCOUNTER
Spoke with patient spouse. She stated she will have him to give the office a call back. Contacted pt to get him scheduled for a bp check.

## 2024-07-09 ENCOUNTER — PATIENT MESSAGE (OUTPATIENT)
Dept: ADMINISTRATIVE | Facility: HOSPITAL | Age: 39
End: 2024-07-09
Payer: COMMERCIAL

## 2024-08-13 ENCOUNTER — PATIENT MESSAGE (OUTPATIENT)
Dept: ADMINISTRATIVE | Facility: OTHER | Age: 39
End: 2024-08-13
Payer: COMMERCIAL

## 2024-10-02 ENCOUNTER — LAB VISIT (OUTPATIENT)
Dept: LAB | Facility: HOSPITAL | Age: 39
End: 2024-10-02
Payer: COMMERCIAL

## 2024-10-02 ENCOUNTER — OFFICE VISIT (OUTPATIENT)
Dept: OBSTETRICS AND GYNECOLOGY | Facility: CLINIC | Age: 39
End: 2024-10-02
Payer: COMMERCIAL

## 2024-10-02 VITALS
SYSTOLIC BLOOD PRESSURE: 138 MMHG | WEIGHT: 194.31 LBS | BODY MASS INDEX: 35.54 KG/M2 | DIASTOLIC BLOOD PRESSURE: 90 MMHG

## 2024-10-02 DIAGNOSIS — Z11.3 SCREEN FOR STD (SEXUALLY TRANSMITTED DISEASE): ICD-10-CM

## 2024-10-02 DIAGNOSIS — Z01.419 ENCOUNTER FOR GYNECOLOGICAL EXAMINATION WITHOUT ABNORMAL FINDING: Primary | ICD-10-CM

## 2024-10-02 DIAGNOSIS — N76.0 ACUTE VAGINITIS: ICD-10-CM

## 2024-10-02 DIAGNOSIS — Z00.00 HEALTHCARE MAINTENANCE: ICD-10-CM

## 2024-10-02 DIAGNOSIS — Z12.39 SCREENING BREAST EXAMINATION: ICD-10-CM

## 2024-10-02 LAB
BASOPHILS # BLD AUTO: 0.06 K/UL (ref 0–0.2)
BASOPHILS NFR BLD: 0.9 % (ref 0–1.9)
DIFFERENTIAL METHOD BLD: ABNORMAL
EOSINOPHIL # BLD AUTO: 0.1 K/UL (ref 0–0.5)
EOSINOPHIL NFR BLD: 1.8 % (ref 0–8)
ERYTHROCYTE [DISTWIDTH] IN BLOOD BY AUTOMATED COUNT: 14.5 % (ref 11.5–14.5)
HCT VFR BLD AUTO: 36.4 % (ref 37–48.5)
HGB BLD-MCNC: 12.6 G/DL (ref 12–16)
IMM GRANULOCYTES # BLD AUTO: 0.01 K/UL (ref 0–0.04)
IMM GRANULOCYTES NFR BLD AUTO: 0.2 % (ref 0–0.5)
LYMPHOCYTES # BLD AUTO: 2.2 K/UL (ref 1–4.8)
LYMPHOCYTES NFR BLD: 33.7 % (ref 18–48)
MCH RBC QN AUTO: 27.3 PG (ref 27–31)
MCHC RBC AUTO-ENTMCNC: 34.6 G/DL (ref 32–36)
MCV RBC AUTO: 79 FL (ref 82–98)
MONOCYTES # BLD AUTO: 0.4 K/UL (ref 0.3–1)
MONOCYTES NFR BLD: 5.5 % (ref 4–15)
NEUTROPHILS # BLD AUTO: 3.8 K/UL (ref 1.8–7.7)
NEUTROPHILS NFR BLD: 57.9 % (ref 38–73)
NRBC BLD-RTO: 0 /100 WBC
PLATELET # BLD AUTO: 290 K/UL (ref 150–450)
PMV BLD AUTO: 9.4 FL (ref 9.2–12.9)
RBC # BLD AUTO: 4.61 M/UL (ref 4–5.4)
WBC # BLD AUTO: 6.58 K/UL (ref 3.9–12.7)

## 2024-10-02 PROCEDURE — 36415 COLL VENOUS BLD VENIPUNCTURE: CPT

## 2024-10-02 PROCEDURE — 86803 HEPATITIS C AB TEST: CPT

## 2024-10-02 PROCEDURE — 86593 SYPHILIS TEST NON-TREP QUANT: CPT

## 2024-10-02 PROCEDURE — 87340 HEPATITIS B SURFACE AG IA: CPT

## 2024-10-02 PROCEDURE — 87591 N.GONORRHOEAE DNA AMP PROB: CPT

## 2024-10-02 PROCEDURE — 99999 PR PBB SHADOW E&M-EST. PATIENT-LVL III: CPT | Mod: PBBFAC,,,

## 2024-10-02 PROCEDURE — 87491 CHLMYD TRACH DNA AMP PROBE: CPT

## 2024-10-02 PROCEDURE — 83036 HEMOGLOBIN GLYCOSYLATED A1C: CPT

## 2024-10-02 PROCEDURE — 87389 HIV-1 AG W/HIV-1&-2 AB AG IA: CPT

## 2024-10-02 PROCEDURE — 0352U VAGINOSIS SCREEN BY DNA PROBE: CPT

## 2024-10-02 PROCEDURE — 85025 COMPLETE CBC W/AUTO DIFF WBC: CPT

## 2024-10-02 RX ORDER — METRONIDAZOLE 500 MG/1
500 TABLET ORAL 2 TIMES DAILY
Qty: 14 TABLET | Refills: 0 | Status: SHIPPED | OUTPATIENT
Start: 2024-10-02 | End: 2024-10-09

## 2024-10-02 RX ORDER — FLUCONAZOLE 150 MG/1
150 TABLET ORAL
Qty: 2 TABLET | Refills: 0 | Status: SHIPPED | OUTPATIENT
Start: 2024-10-02

## 2024-10-02 NOTE — PROGRESS NOTES
CC: Annual  HISTORY OF PRESENT ILLNESS:    Gina Dickson is a 38 y.o. female, , Patient's last menstrual period was 2024.,  presents for a routine exam and has complaints of vaginal discharge and odor x1 week. Reports using new scented soap.    She is sexually active. She uses NFP/ coitus interuptus for contraception.  History of STDs: denies.  She does want STD screening.  Denies any other GYN complaints.      The patient participates in regular exercise: yes.  The patient does not smoke.  The patient wears seatbelts.   Pt denies any domestic violence. Reports  tattoos or blood transfusions    SCREENING:   Pap: 2023 nilm/ hpv 18+ - colp MAE 1 (repeated today)   Gardasil Status: INCOMPLETE  Mammogram:  2023 nml, TC Score: 6.14 %  repeat at 42y  Colonoscopy: N/A   DEXA:  N/A     GYN FH:   Breast cancer: mat aunt  Colon cancer: mat aunt  Ovarian cancer: none  Endometrial cancer: none    LAST WWE 2023 (Mimi DNP):  vincent Dickson is a 37 y.o.  who presents today as a new patient for a well woman exam.  LMP: Patient's last menstrual period was 2023 (exact date).  Periods are normal and regular.  Reports recurrent BV infections after unprotected sexual intercourse.  Was most recently treated about 3 weeks ago with Flagyl from urgent care. Uses boric acid suppositories PRN. No other issues, problems, or complaints. Specifically, patient denies abnormal vaginal bleeding, discharge, pelvic pain, urinary problems, or changes in appetite. Ms. Dickson is currently sexually active with a single male partner. She is currently using no method for contraception. She declines STD screening today.     Previous Pap:  uknown     Social: Wears seatbelts. Exercises - walking, lost 15 lbs. Feels safe at home.   Smoking status: Former     FH:  Breast cancer: maternal aunt  Colon cancer: maternal aunt  Ovarian cancer: none  Endometrial cancer: none     Ms. Dickson confirms that she  wears her seatbelt when riding in the car and does not text while driving.         OB History    Para Term  AB Living   3 3 2 1 0 2   SAB IAB Ectopic Multiple Live Births   0 0 0 0 2      # Outcome Date GA Lbr Patrick/2nd Weight Sex Type Anes PTL Lv   3  16 35w6d  2.408 kg (5 lb 4.9 oz) M Vag-Spont EPI Y JORDAN   2 Term 2007 40w0d  2.268 kg (5 lb) M Vag-Spont  N JORDAN      Complications: Pre-eclampsia   1 Term                 Past Medical History:  Past Medical History:   Diagnosis Date    Fibroids     Hypertension        Past Surgical History:  Past Surgical History:   Procedure Laterality Date    REPAIR OF COLLATERAL LIGAMENT OF THUMB Right 2023    Procedure: REPAIR, LIGAMENT, COLLATERAL, THUMB - right thumb UCL with internal brace;  Surgeon: Edvin Elias MD;  Location: [facility] OR;  Service: Orthopedics;  Laterality: Right;    TRIGGER FINGER RELEASE Right 2023    Procedure: RELEASE, TRIGGER FINGER - right thumb;  Surgeon: Edvin Elias MD;  Location: [facility] OR;  Service: Orthopedics;  Laterality: Right;    WISDOM TOOTH EXTRACTION Bilateral        Family History:  Family History   Problem Relation Name Age of Onset    Lupus Mother      Hypertension Father      Cancer Maternal Aunt          colon, breast       Allergies:  Review of patient's allergies indicates:   Allergen Reactions    Norco [hydrocodone-acetaminophen] Itching    Tramadol Itching       Medications:  Current Outpatient Medications on File Prior to Visit   Medication Sig Dispense Refill    valsartan (DIOVAN) 160 MG tablet Take 1 tablet (160 mg total) by mouth once daily. 30 tablet 5    ALPRAZolam (XANAX) 0.5 MG tablet Take 0.5-1 tablets (0.25-0.5 mg total) by mouth 2 (two) times daily as needed (SIGNIFICANT ANXIETY). 30 tablet 1    buPROPion (WELLBUTRIN XL) 300 MG 24 hr tablet Take 1 tablet (300 mg total) by mouth once daily. 90 tablet 1    butalbital-acetaminophen-caffeine -40 mg (FIORICET, ESGIC) -40 mg  per tablet Take 1 tablet by mouth every 6 (six) hours as needed. Label with sedative precautions 20 tablet 0    celecoxib (CELEBREX) 200 MG capsule Take 1 capsule (200 mg total) by mouth once daily. 30 capsule 0    cetirizine (ZYRTEC) 10 MG tablet Take 1 tablet (10 mg total) by mouth every evening. for 10 days 10 tablet 0    ibuprofen (ADVIL,MOTRIN) 600 MG tablet Take 1 tablet (600 mg total) by mouth 3 (three) times daily. 90 tablet 0    lactic acid-citric-potassium (PHEXXI) 1.8-1-0.4 % Gel Place 1 applicator vaginally 1 (one) time if needed (prior to sexual intercourse). 60 g 3    meloxicam (MOBIC) 15 MG tablet Take 1 tablet (15 mg total) by mouth once daily. 30 tablet 0    oxyCODONE-acetaminophen (PERCOCET) 5-325 mg per tablet Take 1 tablet by mouth every 4 (four) hours as needed for Pain. 12 tablet 0     No current facility-administered medications on file prior to visit.       Social History:  Social History     Tobacco Use    Smoking status: Former     Types: Cigarettes    Smokeless tobacco: Never   Substance Use Topics    Alcohol use: Yes     Comment: last drink 60 days ago 9/6/2023    Drug use: Yes     Types: Marijuana               ROS:   GENERAL: Feeling well overall. Denies fever or chills.   SKIN: Denies rash or lesions.   HEAD: Denies head injury or headache.   NODES: Denies enlarged lymph nodes.   CHEST: Denies chest pain or shortness of breath.   CARDIOVASCULAR: Denies palpitations or left sided chest pain.   ABDOMEN: No abdominal pain, constipation, diarrhea, nausea, vomiting or rectal bleeding.   URINARY: No dysuria, hematuria, or burning on urination.  REPRODUCTIVE: See HPI.   BREASTS: Denies pain, lumps, or nipple discharge.   HEMATOLOGIC: No easy bruisability or excessive bleeding.   MUSCULOSKELETAL: Denies joint pain or swelling.   NEUROLOGIC: Denies syncope or weakness.   PSYCHIATRIC: Denies depression, anxiety or mood swings.    PE:   APPEARANCE: Well nourished, well developed, Black or   American female in no acute distress.  NODES: no cervical, supraclavicular, or inguinal lymphadenopathy  BREASTS: Symmetrical, no skin changes or visible lesions. No palpable masses, nipple discharge or adenopathy bilaterally.  ABDOMEN: Soft. No tenderness or masses. No distention. No hernias palpated. No CVA tenderness.  VULVA: No lesions. Normal external female genitalia.  URETHRAL MEATUS: Normal size and location, no lesions, no prolapse.  URETHRA: No masses, tenderness, or prolapse.  VAGINA:  Moist. No lesions or lacerations noted. No abnormal discharge present. No odor present.   CERVIX: No lesions or discharge. No cervical motion tenderness.   UTERUS: Normal size, regular shape, mobile, non-tender.  ADNEXA: No tenderness. No fullness or masses palpated in the adnexal regions.   ANUS PERINEUM: Normal.      Diagnosis:  1. Encounter for gynecological examination without abnormal finding    2. Screening breast examination    3. Acute vaginitis    4. Screen for STD (sexually transmitted disease)    5. Healthcare maintenance        Plan:     Orders Placed This Encounter    HPV High Risk Genotypes, PCR    Vaginosis Screen by DNA Probe    Treponema Pallidium Antibodies IgG, IgM    Hepatitis B Surface Antigen    Hepatitis C Antibody    HIV 1/2 Ag/Ab (4th Gen)    C. trachomatis/N. gonorrhoeae by AMP DNA    Hemoglobin A1C    CBC Auto Differential    Liquid-Based Pap Smear, Screening    fluconazole (DIFLUCAN) 150 MG Tab    metroNIDAZOLE (FLAGYL) 500 MG tablet     - Self breast exams encouraged  - Pap and HPV done today.  - Screening tests as ordered.  - Diet and exercise encouraged.  Condom use encouraged for STD prevention.  Seat belt use encouraged.  Reviewed ASCCP Pap guidelines and screening recommendations.  Calcium and vitamin D recommended.     Counseling: HPV vaccine  injury prevention: Driving under the influence of alcohol  Weapons  Seatbelts  Bicycle helmets  Adequate sleep  Exercise  Stress management  techniques  indications for and frequency of periodic gynecologic exam  reviewed current Pap guidelines. Explained new understanding of natural history of cervical disease and improved Paps. Recommended guideline concordant care.    The patient was counseled today on ACS PAP guidelines, with recommendations for yearly pelvic exams unless their uterus, cervix, and ovaries were removed for benign reasons; in that case, examinations every 3-5 years are recommended.  The patient was also counseled regarding monthly breast self-examination, routine STD screening for at-risk populations, prophylactic immunizations for transmitted infections such as  HPV, Pertussis, or Influenza as appropriate, and yearly mammograms when indicated by ACS guidelines.  She was advised to see her primary care physician for all other health maintenance.    Counseling session lasted approximately 10 minutes, and all her questions were answered.    Follow-up with me in 1 year for routine exam or sooner if needed.    Noni Canada, ARACELY-BC

## 2024-10-03 ENCOUNTER — PATIENT MESSAGE (OUTPATIENT)
Dept: ADMINISTRATIVE | Facility: OTHER | Age: 39
End: 2024-10-03
Payer: COMMERCIAL

## 2024-10-03 LAB
ESTIMATED AVG GLUCOSE: 85 MG/DL (ref 68–131)
HBA1C MFR BLD: 4.6 % (ref 4–5.6)
HBV SURFACE AG SERPL QL IA: NORMAL
HCV AB SERPL QL IA: NORMAL
HIV 1+2 AB+HIV1 P24 AG SERPL QL IA: NORMAL
TREPONEMA PALLIDUM IGG+IGM AB [PRESENCE] IN SERUM OR PLASMA BY IMMUNOASSAY: NONREACTIVE

## 2024-10-05 LAB
BACTERIAL VAGINOSIS DNA: NORMAL
C TRACH DNA SPEC QL NAA+PROBE: NOT DETECTED
CANDIDA GLABRATA/KRUSEI: NORMAL
CANDIDA RRNA VAG QL PROBE: NORMAL
N GONORRHOEA DNA SPEC QL NAA+PROBE: NOT DETECTED
TRICHOMONAS VAGINALIS: NORMAL

## 2024-10-07 ENCOUNTER — PATIENT MESSAGE (OUTPATIENT)
Dept: OBSTETRICS AND GYNECOLOGY | Facility: CLINIC | Age: 39
End: 2024-10-07
Payer: COMMERCIAL

## 2024-10-16 ENCOUNTER — PATIENT MESSAGE (OUTPATIENT)
Dept: ADMINISTRATIVE | Facility: HOSPITAL | Age: 39
End: 2024-10-16
Payer: COMMERCIAL

## 2024-11-13 ENCOUNTER — PATIENT MESSAGE (OUTPATIENT)
Dept: ADMINISTRATIVE | Facility: HOSPITAL | Age: 39
End: 2024-11-13
Payer: COMMERCIAL

## 2024-11-18 ENCOUNTER — HOSPITAL ENCOUNTER (OUTPATIENT)
Dept: RADIOLOGY | Facility: HOSPITAL | Age: 39
Discharge: HOME | End: 2024-11-18
Attending: ORTHOPAEDIC SURGERY
Payer: COMMERCIAL

## 2024-11-18 ENCOUNTER — OFFICE VISIT (OUTPATIENT)
Dept: SPORTS MEDICINE | Facility: CLINIC | Age: 39
End: 2024-11-18
Payer: COMMERCIAL

## 2024-11-18 ENCOUNTER — OFFICE VISIT (OUTPATIENT)
Dept: PRIMARY CARE CLINIC | Facility: CLINIC | Age: 39
End: 2024-11-18
Payer: COMMERCIAL

## 2024-11-18 ENCOUNTER — PATIENT OUTREACH (OUTPATIENT)
Dept: ADMINISTRATIVE | Facility: HOSPITAL | Age: 39
End: 2024-11-18
Payer: COMMERCIAL

## 2024-11-18 VITALS
HEART RATE: 58 BPM | HEIGHT: 62 IN | SYSTOLIC BLOOD PRESSURE: 136 MMHG | TEMPERATURE: 98 F | DIASTOLIC BLOOD PRESSURE: 89 MMHG | BODY MASS INDEX: 35.4 KG/M2 | OXYGEN SATURATION: 97 % | RESPIRATION RATE: 18 BRPM | WEIGHT: 192.38 LBS

## 2024-11-18 DIAGNOSIS — M79.672 BILATERAL FOOT PAIN: ICD-10-CM

## 2024-11-18 DIAGNOSIS — M76.822 POSTERIOR TIBIAL TENDINITIS OF LEFT LOWER EXTREMITY: Primary | ICD-10-CM

## 2024-11-18 DIAGNOSIS — G89.29 CHRONIC PAIN OF LEFT ANKLE: ICD-10-CM

## 2024-11-18 DIAGNOSIS — M79.671 BILATERAL FOOT PAIN: ICD-10-CM

## 2024-11-18 DIAGNOSIS — M25.572 CHRONIC PAIN OF LEFT ANKLE: ICD-10-CM

## 2024-11-18 DIAGNOSIS — I10 ESSENTIAL HYPERTENSION, BENIGN: ICD-10-CM

## 2024-11-18 DIAGNOSIS — M76.71 PERONEUS BREVIS TENDINITIS, RIGHT: ICD-10-CM

## 2024-11-18 DIAGNOSIS — Z00.00 WELL ADULT EXAM: Primary | ICD-10-CM

## 2024-11-18 DIAGNOSIS — E66.01 SEVERE OBESITY (BMI 35.0-39.9) WITH COMORBIDITY: ICD-10-CM

## 2024-11-18 PROCEDURE — 99999 PR PBB SHADOW E&M-EST. PATIENT-LVL III: CPT | Mod: PBBFAC,,, | Performed by: ORTHOPAEDIC SURGERY

## 2024-11-18 PROCEDURE — 73610 X-RAY EXAM OF ANKLE: CPT | Mod: 26,LT,, | Performed by: RADIOLOGY

## 2024-11-18 PROCEDURE — 99214 OFFICE O/P EST MOD 30 MIN: CPT | Mod: S$GLB,,, | Performed by: ORTHOPAEDIC SURGERY

## 2024-11-18 PROCEDURE — 73610 X-RAY EXAM OF ANKLE: CPT | Mod: TC,LT

## 2024-11-18 PROCEDURE — 3079F DIAST BP 80-89 MM HG: CPT | Mod: CPTII,S$GLB,, | Performed by: FAMILY MEDICINE

## 2024-11-18 PROCEDURE — 90471 IMMUNIZATION ADMIN: CPT | Mod: S$GLB,,, | Performed by: FAMILY MEDICINE

## 2024-11-18 PROCEDURE — 1159F MED LIST DOCD IN RCRD: CPT | Mod: CPTII,S$GLB,, | Performed by: FAMILY MEDICINE

## 2024-11-18 PROCEDURE — 3075F SYST BP GE 130 - 139MM HG: CPT | Mod: CPTII,S$GLB,, | Performed by: FAMILY MEDICINE

## 2024-11-18 PROCEDURE — 90656 IIV3 VACC NO PRSV 0.5 ML IM: CPT | Mod: S$GLB,,, | Performed by: FAMILY MEDICINE

## 2024-11-18 PROCEDURE — 3044F HG A1C LEVEL LT 7.0%: CPT | Mod: CPTII,S$GLB,, | Performed by: ORTHOPAEDIC SURGERY

## 2024-11-18 PROCEDURE — 1159F MED LIST DOCD IN RCRD: CPT | Mod: CPTII,S$GLB,, | Performed by: ORTHOPAEDIC SURGERY

## 2024-11-18 PROCEDURE — 1160F RVW MEDS BY RX/DR IN RCRD: CPT | Mod: CPTII,S$GLB,, | Performed by: FAMILY MEDICINE

## 2024-11-18 PROCEDURE — 3044F HG A1C LEVEL LT 7.0%: CPT | Mod: CPTII,S$GLB,, | Performed by: FAMILY MEDICINE

## 2024-11-18 PROCEDURE — 99999 PR PBB SHADOW E&M-EST. PATIENT-LVL IV: CPT | Mod: PBBFAC,,, | Performed by: FAMILY MEDICINE

## 2024-11-18 PROCEDURE — 4010F ACE/ARB THERAPY RXD/TAKEN: CPT | Mod: CPTII,S$GLB,, | Performed by: FAMILY MEDICINE

## 2024-11-18 PROCEDURE — 73630 X-RAY EXAM OF FOOT: CPT | Mod: TC,50

## 2024-11-18 PROCEDURE — 73630 X-RAY EXAM OF FOOT: CPT | Mod: 26,50,, | Performed by: RADIOLOGY

## 2024-11-18 PROCEDURE — 99395 PREV VISIT EST AGE 18-39: CPT | Mod: 25,S$GLB,, | Performed by: FAMILY MEDICINE

## 2024-11-18 PROCEDURE — 4010F ACE/ARB THERAPY RXD/TAKEN: CPT | Mod: CPTII,S$GLB,, | Performed by: ORTHOPAEDIC SURGERY

## 2024-11-18 PROCEDURE — 3008F BODY MASS INDEX DOCD: CPT | Mod: CPTII,S$GLB,, | Performed by: FAMILY MEDICINE

## 2024-11-18 RX ORDER — VALSARTAN 160 MG/1
160 TABLET ORAL DAILY
Qty: 90 TABLET | Refills: 3 | Status: SHIPPED | OUTPATIENT
Start: 2024-11-18

## 2024-11-18 RX ORDER — METHYLPREDNISOLONE 4 MG/1
TABLET ORAL
Qty: 21 EACH | Refills: 0 | Status: SHIPPED | OUTPATIENT
Start: 2024-11-18 | End: 2024-11-18

## 2024-11-18 RX ORDER — AMOXICILLIN 500 MG/1
500 CAPSULE ORAL 3 TIMES DAILY
COMMUNITY
Start: 2024-07-15 | End: 2024-11-18

## 2024-11-18 RX ORDER — CELECOXIB 200 MG/1
200 CAPSULE ORAL DAILY
Qty: 30 CAPSULE | Refills: 0 | Status: SHIPPED | OUTPATIENT
Start: 2024-11-18 | End: 2024-11-18

## 2024-11-18 RX ORDER — HYDROCODONE BITARTRATE AND ACETAMINOPHEN 7.5; 325 MG/1; MG/1
1 TABLET ORAL EVERY 6 HOURS PRN
COMMUNITY
Start: 2024-07-17 | End: 2024-11-18

## 2024-11-18 NOTE — PROGRESS NOTES
ESTABLISHED PATIENT    HISTORY OF PRESENT ILLNESS   38 y.o. Female with a history of left ankle and right foot pain who I have previously treated for left plantar fasciitis and lumbar radiculopathy. Left ankle is worse than her right foot. She states that she has swelling in her left ankle that has been persistent for years. However, she has noticed an increase in swelling and began having pain in her right foot since she started working out for the last 2 months.        - mechanical symptoms, - instability    Is affecting ADLs.  Pain is 7/10 at it's worst.        PAST MEDICAL HISTORY    Past Medical History:   Diagnosis Date    Fibroids     Hypertension        PAST SURGICAL HISTORY     Past Surgical History:   Procedure Laterality Date    REPAIR OF COLLATERAL LIGAMENT OF THUMB Right 4/28/2023    Procedure: REPAIR, LIGAMENT, COLLATERAL, THUMB - right thumb UCL with internal brace;  Surgeon: Edvin Elias MD;  Location: [facility] OR;  Service: Orthopedics;  Laterality: Right;    TRIGGER FINGER RELEASE Right 9/6/2023    Procedure: RELEASE, TRIGGER FINGER - right thumb;  Surgeon: Edvin Elias MD;  Location: [facility] OR;  Service: Orthopedics;  Laterality: Right;    WISDOM TOOTH EXTRACTION Bilateral 2011       FAMILY HISTORY    Family History   Problem Relation Name Age of Onset    Lupus Mother      Hypertension Father      Cancer Maternal Aunt          colon, breast       SOCIAL HISTORY    Social History     Socioeconomic History    Marital status: Single   Tobacco Use    Smoking status: Former     Types: Cigarettes    Smokeless tobacco: Never   Substance and Sexual Activity    Alcohol use: Yes     Comment: last drink 60 days ago 9/6/2023    Drug use: Yes     Types: Marijuana    Sexual activity: Not Currently     Partners: Male     Birth control/protection: None     Social Drivers of Health     Financial Resource Strain: Low Risk  (10/1/2024)    Overall Financial Resource Strain ([facility])     Difficulty of Paying Living  Expenses: Not hard at all   Food Insecurity: No Food Insecurity (10/1/2024)    Hunger Vital Sign     Worried About Running Out of Food in the Last Year: Never true     Ran Out of Food in the Last Year: Never true   Transportation Needs: No Transportation Needs (7/26/2023)    PRAPARE - Transportation     Lack of Transportation (Medical): No     Lack of Transportation (Non-Medical): No   Physical Activity: Sufficiently Active (10/1/2024)    Exercise Vital Sign     Days of Exercise per Week: 5 days     Minutes of Exercise per Session: 60 min   Stress: Stress Concern Present (10/1/2024)    [employer] [place] [employer] - Occupational Stress Questionnaire     Feeling of Stress : To some extent   Housing Stability: Low Risk  (7/26/2023)    Housing Stability Vital Sign     Unable to Pay for Housing in the Last Year: No     Number of Places Lived in the Last Year: 1     Unstable Housing in the Last Year: No       MEDICATIONS      Current Outpatient Medications:     ALPRAZolam (XANAX) 0.5 MG tablet, Take 0.5-1 tablets (0.25-0.5 mg total) by mouth 2 (two) times daily as needed (SIGNIFICANT ANXIETY)., Disp: 30 tablet, Rfl: 1    buPROPion (WELLBUTRIN XL) 300 MG 24 hr tablet, Take 1 tablet (300 mg total) by mouth once daily., Disp: 90 tablet, Rfl: 1    butalbital-acetaminophen-caffeine -40 mg (FIORICET, ESGIC) -40 mg per tablet, Take 1 tablet by mouth every 6 (six) hours as needed. Label with sedative precautions, Disp: 20 tablet, Rfl: 0    celecoxib (CELEBREX) 200 MG capsule, Take 1 capsule (200 mg total) by mouth once daily., Disp: 30 capsule, Rfl: 0    cetirizine (ZYRTEC) 10 MG tablet, Take 1 tablet (10 mg total) by mouth every evening. for 10 days, Disp: 10 tablet, Rfl: 0    fluconazole (DIFLUCAN) 150 MG Tab, Take 1 tablet (150 mg total) by mouth every 72 hours as needed (yeast infection symptoms)., Disp: 2 tablet, Rfl: 0    ibuprofen (ADVIL,MOTRIN) 600 MG tablet, Take 1 tablet (600 mg total) by mouth  3 (three) times daily., Disp: 90 tablet, Rfl: 0    lactic acid-citric-potassium (PHEXXI) 1.8-1-0.4 % Gel, Place 1 applicator vaginally 1 (one) time if needed (prior to sexual intercourse)., Disp: 60 g, Rfl: 3    meloxicam (MOBIC) 15 MG tablet, Take 1 tablet (15 mg total) by mouth once daily., Disp: 30 tablet, Rfl: 0    oxyCODONE-acetaminophen (PERCOCET) 5-325 mg per tablet, Take 1 tablet by mouth every 4 (four) hours as needed for Pain., Disp: 12 tablet, Rfl: 0    valsartan (DIOVAN) 160 MG tablet, Take 1 tablet (160 mg total) by mouth once daily., Disp: 30 tablet, Rfl: 5    ALLERGIES     Review of patient's allergies indicates:   Allergen Reactions    Norco [hydrocodone-acetaminophen] Itching    Tramadol Itching         REVIEW OF SYSTEMS   Constitution: Negative. Negative for chills, fever and night sweats.   HENT: Negative for congestion and headaches.    Eyes: Negative for blurred vision, left vision loss and right vision loss.   Cardiovascular: Negative for chest pain and syncope.   Respiratory: Negative for cough and shortness of breath.    Endocrine: Negative for polydipsia, polyphagia and polyuria.   Hematologic/Lymphatic: Negative for bleeding problem. Does not bruise/bleed easily.   Skin: Negative for dry skin, itching and rash.   Musculoskeletal: Negative for falls. Positive for left ankle and right foot pain  Gastrointestinal: Negative for abdominal pain and bowel incontinence.   Genitourinary: Negative for bladder incontinence and nocturia.   Neurological: Negative for disturbances in coordination, loss of balance and seizures.   Psychiatric/Behavioral: Negative for depression. The patient does not have insomnia.    Allergic/Immunologic: Negative for hives and persistent infections.     PHYSICAL EXAMINATION    Vitals: There were no vitals taken for this visit.    General: The patient appears active and healthy with no apparent physical problems.  No disturbance of mood or affect is demonstrated. Alert and  Oriented.    HEENT: Eyes normal, pupils equally round, nose normal.    Resp: Equal and symmetrical chest rises. No wheezing    CV: Regular rate    Neck: Supple; nonpainful range of motion.    Extremities: no cyanosis, clubbing, edema, or diffuse swelling.  Palpable pulses, good capillary refill of the digits.  No coolness, discoloration, edema or obvious varicosities.    Skin: no lesions noted.    Lymphatic: no detected adenopathy in the upper or lower extremities.    Neurologic: normal mental status, normal reflexes, normal gait and balance.  Patient is alert and oriented to person, place and time.  No flaccidity or spasticity is noted.  No motor or sensory deficits are noted.  Light touch is intact    Orthopaedic: Ankle Exam-Left     Inspection: Normal skin color and appearance with no scars, no ecchymosis and mild swelling.  Longitudinal arch is normal.     ROM: 20° dorsiflexion, 40° plantar flexion, 5° inversion and 5° eversion.  There is no pain with ROM testing.      Palpation: There is no tenderness over the navicular, deltoid ligament, medial malleolus, anterior ankle joint, tibiofibular syndesmosis, lateral malleolus, ATFL, CFL, posterior talofibular ligament, sinus tarsi, calcaneus, plantar fascia origin, cuboid, fifth metatarsal, Achilles tendon, or peroneal tendons.  + tenderness posterior tibialis tendon    Stability: - Anterior drawer - Talar tilt  Varus and valgus stress reveals no gapping or instability.     Strength: Muscle testing is 5/5 dorsiflexion, 5/5 plantar flexion, 5/5 inversion and 5/5 eversion.      Neuro: Sensation is normal in L4, L5 and S1 nerve distribution.  Reflexes are 2/2 ankle and 2/2 knee jerk.     Vascular: 2+ Pedal pulses are palpable.  Negative Homans.    + too many toes sign     Foot Exam - RIGHT    Inspection: No swelling, no erythema, no bruising.  There is no increased warmth and no signs of  ulcerations or open wounds.  Medial arch is normal.  Transverse arch is normal.       Palpation: No tenderness over the calcaneus, plantar fascia, navicular, cuboid, metatarsals, Lisfranc ligament or phalanges.     Motor: 5/5 EHL, 5/5 FHL, 5/5 anterior tibialis, 5/5 posterior tibialis, 5/5 gastrocsoleus and 5/5 peroneals.      Neuro: Sensory exam shows no decreased sensation to light touch in sural, saphenous, deep peroneal, superficial peroneal, or tibial nerve distribution.      Vascular: Pedal pulses are palpable with brisk capillary refill of the digits.    IMAGING    X-Ray Ankle Complete Left  Narrative: EXAMINATION:  XR ANKLE COMPLETE 3 VIEW LEFT    CLINICAL HISTORY:  Pain in left ankle and joints of left foot    TECHNIQUE:  AP, lateral and oblique views of the left ankle were performed.    COMPARISON:  None    FINDINGS:  Skeletal structures at left ankle are intact.  Ankle joint space looks normal without significant cartilage loss.  No erosion detected.  Mild soft tissue swelling is present on the medial side.  Impression: Mild soft tissue swelling.  No significant arthritis at left ankle.    Electronically signed by: Power Mario MD  Date:    11/18/2024  Time:    15:49        IMPRESSION       ICD-10-CM ICD-9-CM   1. Posterior tibial tendinitis of left lower extremity  M76.822 726.72   2. Peroneus brevis tendinitis, right  M76.71 727.06   3. Bilateral foot pain  M79.671 729.5    M79.672    4. Chronic pain of left ankle  M25.572 719.47    G89.29 338.29       MEDICATIONS PRESCRIBED      Medrol dose pack   Celebrex - begin once finished with dose pack     RECOMMENDATIONS     Physical therapy order placed today   I recommend that she begin wearing the boot she has for her left ankle.   RTC if pain continues after 1 month. At this time I will consider ordering an MRI of her left ankle to evaluate her posterior tibialis tendon.       All questions were answered, pt will contact us for questions or concerns in the interim.

## 2024-11-19 NOTE — PROGRESS NOTES
"Subjective:       Patient ID: Gina Dickson is a 38 y.o. female.    Chief Complaint: Annual Exam    History of Present Illness    CHIEF COMPLAINT:  Patient presents today for follow-up.    MUSCULOSKELETAL:  She reports severe ankle pain that has been "bothering me bad." This pain is impacting her daily activities, including her ability to go to the gym. She has consulted with Dr. Ngo regarding this issue.    ANXIETY AND MOOD:  She reports improved overall well-being compared to previous visits. Her mood is generally good, though she acknowledges having had some rough periods in the past. She manages her anxiety through regular gym visits before starting work, which helps her relax and cope with stress. She continues to work from home for the same employer. She is not currently taking Wellbutrin.    GYNECOLOGICAL:  She reports experiencing recurrent yeast infections, describing them as occurring frequently "for any little damn thing." No specific triggers or exacerbating factors are mentioned. She expresses frustration with the recurring nature of the infections.    CARDIOVASCULAR:  She reports improved blood pressure and is currently taking Belsercin for management.    SOCIAL HISTORY:  She reports current tobacco use as a coping mechanism for stress, acknowledging awareness of health implications. She  from her partner in 2022 for six months before reconciling. Their relationship has been progressing well since getting back together, with frequent discussions about marriage.    OBSTETRIC HISTORY:  She reports a miscarriage in September 2020, which she attributes to stress following a family tragedy in August. She denies any current desire for pregnancy.    MEDICATIONS:  She is currently taking Belsercin for blood pressure management.      ROS:  Musculoskeletal: +joint pain  Psychiatric: +anxiety          Review of patient's allergies indicates:   Allergen Reactions    Norco " [hydrocodone-acetaminophen] Itching    Tramadol Itching         Current Outpatient Medications:     ALPRAZolam (XANAX) 0.5 MG tablet, Take 0.5-1 tablets (0.25-0.5 mg total) by mouth 2 (two) times daily as needed (SIGNIFICANT ANXIETY)., Disp: 30 tablet, Rfl: 1    valsartan (DIOVAN) 160 MG tablet, Take 1 tablet (160 mg total) by mouth once daily., Disp: 90 tablet, Rfl: 3  No current facility-administered medications for this visit.    Past Medical History:   Diagnosis Date    Fibroids     Hypertension       Past Surgical History:   Procedure Laterality Date    REPAIR OF COLLATERAL LIGAMENT OF THUMB Right 4/28/2023    Procedure: REPAIR, LIGAMENT, COLLATERAL, THUMB - right thumb UCL with internal brace;  Surgeon: Edvin Elias MD;  Location: [facility] OR;  Service: Orthopedics;  Laterality: Right;    TRIGGER FINGER RELEASE Right 9/6/2023    Procedure: RELEASE, TRIGGER FINGER - right thumb;  Surgeon: Edvin Elias MD;  Location: [facility] OR;  Service: Orthopedics;  Laterality: Right;    WISDOM TOOTH EXTRACTION Bilateral 2011      Social History     Socioeconomic History    Marital status: Single   Tobacco Use    Smoking status: Former     Types: Cigarettes    Smokeless tobacco: Never   Substance and Sexual Activity    Alcohol use: Yes     Comment: ocas    Drug use: Not Currently     Types: Marijuana    Sexual activity: Yes     Partners: Male     Birth control/protection: None     Social Drivers of Health     Financial Resource Strain: Low Risk  (10/1/2024)    Overall Financial Resource Strain (CARDIA)     Difficulty of Paying Living Expenses: Not hard at all   Food Insecurity: No Food Insecurity (10/1/2024)    Hunger Vital Sign     Worried About Running Out of Food in the Last Year: Never true     Ran Out of Food in the Last Year: Never true   Transportation Needs: No Transportation Needs (7/26/2023)    PRAPARE - Transportation     Lack of Transportation (Medical): No     Lack of Transportation (Non-Medical): No   Physical  Activity: Sufficiently Active (10/1/2024)    Exercise Vital Sign     Days of Exercise per Week: 5 days     Minutes of Exercise per Session: 60 min   Stress: Stress Concern Present (10/1/2024)    [employer] - Occupational Stress Questionnaire     Feeling of Stress : To some extent   Housing Stability: Low Risk  (7/26/2023)    Housing Stability Vital Sign     Unable to Pay for Housing in the Last Year: No     Number of Places Lived in the Last Year: 1     Unstable Housing in the Last Year: No      Family History   Problem Relation Name Age of Onset    Lupus Mother      Hypertension Father      Cancer Maternal Aunt          colon, breast     Review of Systems   Constitutional:  Negative for activity change and unexpected weight change.   HENT:  Negative for hearing loss, rhinorrhea and trouble swallowing.    Eyes:  Negative for discharge and visual disturbance.   Respiratory:  Negative for chest tightness and wheezing.    Cardiovascular:  Negative for chest pain and palpitations.   Gastrointestinal:  Negative for blood in stool, constipation, diarrhea and vomiting.   Endocrine: Negative for polydipsia and polyuria.   Genitourinary:  Negative for difficulty urinating, dysuria, hematuria and menstrual problem.   Musculoskeletal:  Positive for arthralgias and joint swelling. Negative for neck pain.   Neurological:  Negative for weakness and headaches.   Psychiatric/Behavioral:  Negative for confusion and dysphoric mood.        Objective:      Physical Exam    Vitals: Blood pressure improved.  General: No acute distress. Well-developed. Well-nourished.  Eyes: EOMI. Sclerae anicteric.  HENT: Normocephalic. Atraumatic. Nares patent. Moist oral mucosa.  Cardiovascular: Regular rate. Regular rhythm. No murmurs. No rubs. No gallops. Normal S1, S2.  Respiratory: Normal respiratory effort. Clear to auscultation bilaterally. No rales. No rhonchi. No wheezing.  Musculoskeletal: No  obvious  "deformity.  Extremities: No lower extremity edema.  Neurological: Alert & oriented x3. No slurred speech. Normal gait.  Psychiatric: Normal mood. Normal affect. Good insight. Good judgment.  Skin: Warm. Dry. No rash.          Assessment:       1. Well adult exam    2. Severe obesity (BMI 35.0-39.9) with comorbidity    3. Essential hypertension, benign        Plan:         Gina Arshad" was seen today for annual exam.    Diagnoses and all orders for this visit:    Well adult exam  Care gaps addressed  -     Comprehensive Metabolic Panel; Future  -     Lipid Panel; Future  -     influenza (Flulaval, Fluzone, Fluarix) 45 mcg/0.5 mL IM vaccine (> or = 6 mo) 0.5 mL    Severe obesity (BMI 35.0-39.9) with comorbidity  Minimal fluctuations in weight    Essential hypertension, benign  BP stable  -     valsartan (DIOVAN) 160 MG tablet; Take 1 tablet (160 mg total) by mouth once daily.        Assessment & Plan    ANXIETY MANAGEMENT:  - Patient to continue going to the gym for anxiety management.    MEDICATION MANAGEMENT:  - Continued Belsomra.  - Discontinued Wellbutrin.  - Provided 90 days of medication refills.    LABS:  - CMP and lipid panel ordered.    IMMUNIZATIONS:  - Flu vaccine administered in office.    FOLLOW-UP:  - Follow up for flu vaccine by December 3rd if not administered today.          Christa Santoro MD        This note was generated with the assistance of ambient listening technology. Verbal consent was obtained by the patient and accompanying visitor(s) for the recording of patient appointment to facilitate this note. I attest to having reviewed and edited the generated note for accuracy, though some syntax or spelling errors may persist. Please contact the author of this note for any clarification.       "

## 2024-12-02 DIAGNOSIS — M76.71 PERONEUS BREVIS TENDINITIS, RIGHT: ICD-10-CM

## 2024-12-02 DIAGNOSIS — M76.822 POSTERIOR TIBIAL TENDINITIS OF LEFT LOWER EXTREMITY: Primary | ICD-10-CM

## 2024-12-02 RX ORDER — MELOXICAM 15 MG/1
15 TABLET ORAL DAILY
Qty: 30 TABLET | Refills: 0 | Status: SHIPPED | OUTPATIENT
Start: 2024-12-02

## 2024-12-05 PROBLEM — M76.822 POSTERIOR TIBIAL TENDINITIS OF LEFT LEG: Status: ACTIVE | Noted: 2024-12-05

## 2025-02-11 ENCOUNTER — PATIENT MESSAGE (OUTPATIENT)
Dept: OBSTETRICS AND GYNECOLOGY | Facility: CLINIC | Age: 40
End: 2025-02-11
Payer: COMMERCIAL

## 2025-02-12 ENCOUNTER — OFFICE VISIT (OUTPATIENT)
Dept: OBSTETRICS AND GYNECOLOGY | Facility: CLINIC | Age: 40
End: 2025-02-12
Payer: COMMERCIAL

## 2025-02-12 VITALS
BODY MASS INDEX: 34.72 KG/M2 | SYSTOLIC BLOOD PRESSURE: 138 MMHG | WEIGHT: 189.81 LBS | DIASTOLIC BLOOD PRESSURE: 88 MMHG

## 2025-02-12 DIAGNOSIS — N76.0 ACUTE VAGINITIS: Primary | ICD-10-CM

## 2025-02-12 PROCEDURE — 99999 PR PBB SHADOW E&M-EST. PATIENT-LVL II: CPT | Mod: PBBFAC,,,

## 2025-02-12 PROCEDURE — 3075F SYST BP GE 130 - 139MM HG: CPT | Mod: CPTII,S$GLB,,

## 2025-02-12 PROCEDURE — 4010F ACE/ARB THERAPY RXD/TAKEN: CPT | Mod: CPTII,S$GLB,,

## 2025-02-12 PROCEDURE — 99213 OFFICE O/P EST LOW 20 MIN: CPT | Mod: S$GLB,,,

## 2025-02-12 PROCEDURE — 3008F BODY MASS INDEX DOCD: CPT | Mod: CPTII,S$GLB,,

## 2025-02-12 PROCEDURE — 87591 N.GONORRHOEAE DNA AMP PROB: CPT

## 2025-02-12 PROCEDURE — 81515 NFCT DS BV&VAGINITIS DNA ALG: CPT

## 2025-02-12 PROCEDURE — 3079F DIAST BP 80-89 MM HG: CPT | Mod: CPTII,S$GLB,,

## 2025-02-12 RX ORDER — METRONIDAZOLE 500 MG/1
500 TABLET ORAL 2 TIMES DAILY
Qty: 14 TABLET | Refills: 0 | Status: SHIPPED | OUTPATIENT
Start: 2025-02-12 | End: 2025-02-19

## 2025-02-12 RX ORDER — FLUCONAZOLE 150 MG/1
150 TABLET ORAL
Qty: 2 TABLET | Refills: 0 | Status: SHIPPED | OUTPATIENT
Start: 2025-02-12

## 2025-02-12 NOTE — PROGRESS NOTES
Ms Gina Dickson  is a 39 y.o.  female G 3 P 2 that presents with complaint of vaginal discharge and vaginal odor for  the last few days . She reports white discharge.   She denies itching, reports odor.  She reports h/o vaginitis. Denies nausea, vomiting, diarrhea, constipation, dysuria, dyspareunia, abdominal pain. She would like STD screening at this visit. No other concerns or complaints at this visit.      We also discussed last pap results and that colposcopy is needed. Pt would like to be scheduled in the first week in March.     Past Medical History:   Diagnosis Date    Fibroids     Hypertension      Past Surgical History:   Procedure Laterality Date    REPAIR OF COLLATERAL LIGAMENT OF THUMB Right 2023    Procedure: REPAIR, LIGAMENT, COLLATERAL, THUMB - right thumb UCL with internal brace;  Surgeon: Edvin Elias MD;  Location: [facility] OR;  Service: Orthopedics;  Laterality: Right;    TRIGGER FINGER RELEASE Right 2023    Procedure: RELEASE, TRIGGER FINGER - right thumb;  Surgeon: Edvin Elias MD;  Location: [facility] OR;  Service: Orthopedics;  Laterality: Right;    WISDOM TOOTH EXTRACTION Bilateral      Social History     Tobacco Use    Smoking status: Former     Types: Cigarettes    Smokeless tobacco: Never   Substance Use Topics    Alcohol use: Yes     Comment: ocas    Drug use: Not Currently     Types: Marijuana     Family History   Problem Relation Name Age of Onset    Lupus Mother      Hypertension Father      Cancer Maternal Aunt          colon, breast     OB History    Para Term  AB Living   3 3 2 1 0 2   SAB IAB Ectopic Multiple Live Births   0 0 0 0 2      # Outcome Date GA Lbr Patrick/2nd Weight Sex Type Anes PTL Lv   3  16 35w6d  2.408 kg (5 lb 4.9 oz) M Vag-Spont EPI Y JORDAN   2 Term 2007 40w0d  2.268 kg (5 lb) M Vag-Spont  N JORDAN      Complications: Pre-eclampsia   1 Term                /88   Wt 86.1 kg (189 lb 13.1 oz)   LMP 2025  (Exact Date)   BMI 34.72 kg/m²     ROS:  GENERAL: No fever, chills, fatigability or weight loss.  VULVAR: No pain, no lesions and no itching.  VAGINAL: No relaxation, no abnormal bleeding and no lesions. + + vaginal discharge and odor  ABDOMEN: No abdominal pain. Denies nausea. Denies vomiting. No diarrhea. No constipation  BREAST: Denies pain. No lumps. No discharge.  URINARY: No incontinence, no nocturia, no frequency and no dysuria.  CARDIOVASCULAR: No chest pain. No shortness of breath. No leg cramps.  NEUROLOGICAL: No headaches. No vision changes.    PHYSICAL EXAM:  VULVA: normal appearing vulva with no masses, tenderness or lesions   VAGINA: normal appearing vagina with normal color. + thin white discharge, no lesions   CERVIX: normal appearing cervix without discharge or lesions   UTERUS: uterus is normal size, shape, consistency and nontender   ADNEXA: normal adnexa in size, nontender and no masses    ASSESSMENT and PLAN:    ICD-10-CM ICD-9-CM    1. Acute vaginitis  N76.0 616.10 metroNIDAZOLE (FLAGYL) 500 MG tablet      fluconazole (DIFLUCAN) 150 MG Tab      Vaginosis Screen by DNA Probe      C. trachomatis/N. gonorrhoeae by AMP DNA          Impression: bacterial vaginosis  Plan:   Prescriptions as noted in orders  Reviewed vulvar/vaginal care and hygiene  Return visit if symptoms persist or progress despite treatment  STD screening    Vaginitis Prevention:  - Avoiding feminine products such as deoderant soaps, body wash, bubble bath, douches, scented toilet paper, deoderant tampons or pads, feminine wipes, chronic pad use, etc. If you wash with soap make sure its hypo allergic (free of added scents or colors)   - Avoiding other vulvovaginal irritants such as long hot baths, humidity, tight, synthetic clothing, chlorine and sitting around in wet bathing suits  - Wearing cotton underwear, avoiding thong underwear and no underwear to bed-wear loose cotton bottoms to bed   - Taking showers instead of baths and  use a hair dryer on cool setting afterwards to dry  - Wearing cotton to exercise and shower immediately after exercise and change clothes  - Using polyurethane condoms without spermicide if sexually active and symptoms are triggered by intercourse  - Use laundry detergent without added perfumes or colors   - Do not have sexual intercourse until symptoms have gone away   - Increase your intake of probiotics--you can get these by eating yogurt/greek yogurt or by buying over the counter probiotic supplements     Probiotic Information:   Any probiotic you choose needs to have ALL of the following components (Each needs to be >10 colony forming units [CFUs]):   - L. Acidophilus  - L. Rhamnosus  - L. Fermentum       FOLLOW UP: PRN lack of improvement.    ARACELY Fry-BC

## 2025-02-14 LAB
BACTERIAL VAGINOSIS DNA: NOT DETECTED
C TRACH DNA SPEC QL NAA+PROBE: NOT DETECTED
CANDIDA GLABRATA/KRUSEI: NOT DETECTED
CANDIDA RRNA VAG QL PROBE: NOT DETECTED
N GONORRHOEA DNA SPEC QL NAA+PROBE: NOT DETECTED
TRICHOMONAS VAGINALIS: NOT DETECTED

## 2025-02-27 ENCOUNTER — OFFICE VISIT (OUTPATIENT)
Dept: PRIMARY CARE CLINIC | Facility: CLINIC | Age: 40
End: 2025-02-27
Payer: COMMERCIAL

## 2025-02-27 VITALS
RESPIRATION RATE: 16 BRPM | SYSTOLIC BLOOD PRESSURE: 122 MMHG | TEMPERATURE: 97 F | HEIGHT: 62 IN | OXYGEN SATURATION: 95 % | DIASTOLIC BLOOD PRESSURE: 78 MMHG | WEIGHT: 193.69 LBS | BODY MASS INDEX: 35.64 KG/M2 | HEART RATE: 87 BPM

## 2025-02-27 DIAGNOSIS — Z51.89 WOUND CHECK, ABSCESS: ICD-10-CM

## 2025-02-27 DIAGNOSIS — Z76.89 ENCOUNTER TO ESTABLISH CARE: Primary | ICD-10-CM

## 2025-02-27 DIAGNOSIS — L02.31 GLUTEAL ABSCESS: ICD-10-CM

## 2025-02-27 DIAGNOSIS — L02.91 ABSCESS: ICD-10-CM

## 2025-02-27 DIAGNOSIS — F33.1 DEPRESSION, MAJOR, RECURRENT, MODERATE: ICD-10-CM

## 2025-02-27 DIAGNOSIS — Z09 HOSPITAL DISCHARGE FOLLOW-UP: ICD-10-CM

## 2025-02-27 PROBLEM — M54.16 LUMBAR RADICULOPATHY: Status: RESOLVED | Noted: 2023-09-11 | Resolved: 2025-02-27

## 2025-02-27 PROBLEM — F41.9 ANXIETY DISORDER: Status: RESOLVED | Noted: 2023-05-08 | Resolved: 2025-02-27

## 2025-02-27 PROBLEM — M79.644 THUMB PAIN, RIGHT: Status: RESOLVED | Noted: 2023-05-17 | Resolved: 2025-02-27

## 2025-02-27 PROBLEM — F41.8 DEPRESSION WITH ANXIETY: Status: RESOLVED | Noted: 2022-08-24 | Resolved: 2025-02-27

## 2025-02-27 PROBLEM — M79.89 SWELLING OF RIGHT THUMB: Status: RESOLVED | Noted: 2023-05-17 | Resolved: 2025-02-27

## 2025-02-27 PROBLEM — E66.01 SEVERE OBESITY (BMI 35.0-39.9) WITH COMORBIDITY: Status: RESOLVED | Noted: 2023-05-05 | Resolved: 2025-02-27

## 2025-02-27 PROBLEM — M76.822 POSTERIOR TIBIAL TENDINITIS OF LEFT LEG: Status: RESOLVED | Noted: 2024-12-05 | Resolved: 2025-02-27

## 2025-02-27 PROBLEM — I10 UNCONTROLLED HYPERTENSION: Status: RESOLVED | Noted: 2022-08-24 | Resolved: 2025-02-27

## 2025-02-27 PROBLEM — M25.60 RANGE OF MOTION DEFICIT: Status: RESOLVED | Noted: 2023-05-17 | Resolved: 2025-02-27

## 2025-02-27 PROCEDURE — 99999 PR PBB SHADOW E&M-EST. PATIENT-LVL IV: CPT | Mod: PBBFAC,,, | Performed by: NURSE PRACTITIONER

## 2025-02-27 RX ORDER — IBUPROFEN 800 MG/1
800 TABLET ORAL 2 TIMES DAILY PRN
Qty: 60 TABLET | Refills: 2 | Status: SHIPPED | OUTPATIENT
Start: 2025-02-27

## 2025-02-27 NOTE — PROGRESS NOTES
Assessment:       1. Encounter to establish care    2. Hospital discharge follow-up    3. Gluteal abscess    4. Wound check, abscess    5. Abscess    6. Depression, major, recurrent, moderateto severe         Plan:       Encounter to establish care    Hospital discharge follow-up  Gluteal abscess healing well.    Gluteal abscess  Left gluteal abscess healing well.  Continues with slight induration and tenderness.  Patient opting out of restarting doxycycline due to side effects.  Will encouraged ibuprofen use for inflammation and follow-up if swelling or tenderness worsens and we can restart doxycycline.  Cultures positive for strep    Wound check, abscess  -     Ambulatory referral/consult to Internal Medicine  Wounds looks fine.    Abscess    Depression, major, recurrent, moderateto severe  Symptoms well-controlled.  Follow up as needed.    Other orders  -     ibuprofen (ADVIL,MOTRIN) 800 MG tablet; Take 1 tablet (800 mg total) by mouth 2 (two) times daily as needed for Pain.  Dispense: 60 tablet; Refill: 2      Assessment & Plan    IMPRESSION:  - Assessed healing of gluteal abscess, noting good progress with granulation tissue forming.  Still slightly firm proximal to rectum.  Wound site without any drainage.  Noted granulation tissue  - Determined current bump is likely inflammation rather than active infection.  - Recommend anti-inflammatory treatment to address residual swelling  - Reviewed recent culture results showing strep infection, confirming appropriateness of current antibiotic therapy    ANAL ABSCESS:  - Examined the abscess area and noted granulation tissue, indicating healing.  - The area is no longer hard or inflamed.  - Assessed that the abscess is healing well.  - Explained the abscess formation process, distinguishing between infection and inflammation.  - Discussed the role of antibiotics and anti-inflammatories in treating abscesses.  - Noted that the patient had an anal abscess that was  incised, drained, and packed on 2/17.  - The abscess culture showed streptococcus.  - Confirmed that the patient reports the abscess area is no longer painful and the bump is decreasing in size.  - Instructed the patient to discontinue use of gauze on the healing abscess site.  - Recommend rest as needed.  - Continued doxycycline, completing the current course today.  - Prescribed ibuprofen 800 mg twice daily for 7 days to reduce inflammation.  - Advised the patient to contact the office immediately if the abscess area becomes larger.    FAMILY HISTORY:  - Noted family history of colon cancer on the patient's maternal side, affecting aunt and grandfather.  - Documented family history of breast cancer, affecting the patient's maternal aunt.  - Recorded family history of hypertension.  - Noted family history of lupus, with mother being affected since age 21.       Medication List with Changes/Refills   New Medications    IBUPROFEN (ADVIL,MOTRIN) 800 MG TABLET    Take 1 tablet (800 mg total) by mouth 2 (two) times daily as needed for Pain.   Current Medications    ACETAMINOPHEN (TYLENOL) 500 MG TABLET    Take 2 tablets (1,000 mg total) by mouth every 6 (six) hours as needed for Pain.    BACITRACIN 500 UNIT/GRAM OINT    Apply topically 2 (two) times daily. for 10 days    DOXYCYCLINE (VIBRAMYCIN) 100 MG CAP    Take 1 capsule (100 mg total) by mouth 2 (two) times daily. for 10 days    FLUCONAZOLE (DIFLUCAN) 150 MG TAB    Take 1 tablet (150 mg total) by mouth every 72 hours as needed (yeast infection symptoms).    VALSARTAN (DIOVAN) 160 MG TABLET    Take 1 tablet (160 mg total) by mouth once daily.   Discontinued Medications    ALPRAZOLAM (XANAX) 0.5 MG TABLET    Take 0.5-1 tablets (0.25-0.5 mg total) by mouth 2 (two) times daily as needed (SIGNIFICANT ANXIETY).    IBUPROFEN (ADVIL,MOTRIN) 800 MG TABLET    Take 1 tablet (800 mg total) by mouth 3 (three) times daily as needed for Pain.    MELOXICAM (MOBIC) 15 MG TABLET     "Take 1 tablet (15 mg total) by mouth once daily.    OXYCODONE-ACETAMINOPHEN (PERCOCET) 5-325 MG PER TABLET    Take 1 tablet by mouth every 6 (six) hours as needed for Pain.         Subjective:    Patient ID: Gina Dickson is a 39 y.o. female.  Chief Complaint: Follow-up (Wound on buttocks)    History of Present Illness    CHIEF COMPLAINT:  Gina presents today for follow-up of a perianal abscess    HISTORY OF PRESENT ILLNESS:  She has a history of two perianal abscesses. The first occurred on 2/9 from an infected nick due to shaving and was cut and drained without packing. The second developed on 2/17 due to skin irritation from adhesive tape removal, requiring incision, drainage, and packing. She reports foul-smelling, grayish drainage from the second abscess. Culture was obtained on 2/17.    MEDICATIONS:  She completed a 5-day course of Bactrim and is currently on the last day of doxycycline treatment.    FAMILY HISTORY:  Her mother has lupus. There is a family history of hypertension. Her maternal aunt and grandfather had colon cancer, and the same maternal aunt had breast cancer.       Review of Systems   Constitutional:  Negative for chills and fever.   HENT:  Negative for ear pain, nosebleeds, sinus pressure and sore throat.    Respiratory:  Negative for cough and shortness of breath.    Cardiovascular:  Negative for chest pain and palpitations.   Gastrointestinal:  Negative for diarrhea, nausea and vomiting.   Genitourinary: Negative.    Skin:  Positive for wound (Gluteal abscess).   Psychiatric/Behavioral:  Negative for dysphoric mood and sleep disturbance. The patient is not nervous/anxious.        Objective:      Vitals:    02/27/25 1150   BP: 122/78   BP Location: Left arm   Pulse: 87   Resp: 16   Temp: 97.3 °F (36.3 °C)   TempSrc: Temporal   SpO2: 95%   Weight: 87.9 kg (193 lb 10.8 oz)   Height: 5' 2" (1.575 m)     BP Readings from Last 5 Encounters:   02/27/25 122/78   02/20/25 (!) " 177/113   02/16/25 (!) 152/89   02/12/25 138/88   02/09/25 (!) 171/104     Wt Readings from Last 5 Encounters:   02/27/25 87.9 kg (193 lb 10.8 oz)   02/19/25 89.7 kg (197 lb 12 oz)   02/16/25 85.7 kg (189 lb)   02/12/25 86.1 kg (189 lb 13.1 oz)   02/09/25 86.2 kg (190 lb)     Physical Exam  Constitutional:       General: She is not in acute distress.     Appearance: Normal appearance. She is not ill-appearing.   HENT:      Head: Normocephalic.      Mouth/Throat:      Mouth: Mucous membranes are moist.      Pharynx: No pharyngeal swelling or oropharyngeal exudate.      Tonsils: No tonsillar exudate.   Eyes:      Conjunctiva/sclera:      Right eye: Right conjunctiva is not injected.      Left eye: Left conjunctiva is not injected.   Cardiovascular:      Rate and Rhythm: Normal rate and regular rhythm.      Pulses:           Radial pulses are 1+ on the right side and 1+ on the left side.      Heart sounds: No murmur heard.     No systolic murmur is present.      No diastolic murmur is present.   Pulmonary:      Effort: No tachypnea or bradypnea.      Breath sounds: Normal breath sounds. No decreased breath sounds, wheezing, rhonchi or rales.   Abdominal:      General: There is no distension.   Musculoskeletal:      Right lower leg: No edema.      Left lower leg: No edema.   Skin:     General: Skin is warm and dry.      Findings: Wound present.          Neurological:      Mental Status: She is alert.   Psychiatric:         Attention and Perception: Attention normal.         Speech: Speech normal.         Behavior: Behavior normal.           Lab Results   Component Value Date    WBC 6.58 10/02/2024    HGB 12.6 10/02/2024    HCT 36.4 (L) 10/02/2024     10/02/2024    CHOL 182 08/24/2022    TRIG 58 08/24/2022    HDL 44 08/24/2022    ALT 12 04/30/2023    AST 15 04/30/2023     04/30/2023    K 4.0 04/30/2023     04/30/2023    CREATININE 0.9 04/30/2023    BUN 13 04/30/2023    CO2 22 (L) 04/30/2023    HGBA1C  4.6 10/02/2024      This note was generated with the assistance of ambient listening technology. Verbal consent was obtained by the patient and accompanying visitor(s) for the recording of patient appointment to facilitate this note. I attest to having reviewed and edited the generated note for accuracy, though some syntax or spelling errors may persist. Please contact the author of this note for any clarification.

## 2025-03-11 ENCOUNTER — PROCEDURE VISIT (OUTPATIENT)
Dept: OBSTETRICS AND GYNECOLOGY | Facility: CLINIC | Age: 40
End: 2025-03-11
Payer: COMMERCIAL

## 2025-03-11 DIAGNOSIS — R87.810 HUMAN PAPILLOMAVIRUS (HPV) TYPE 18 DNA DETECTED IN CERVICAL SPECIMEN: Primary | ICD-10-CM

## 2025-03-11 PROCEDURE — 88305 TISSUE EXAM BY PATHOLOGIST: CPT | Mod: 26,,, | Performed by: PATHOLOGY

## 2025-03-11 PROCEDURE — 88305 TISSUE EXAM BY PATHOLOGIST: CPT | Mod: 59 | Performed by: PATHOLOGY

## 2025-03-11 PROCEDURE — 57454 BX/CURETT OF CERVIX W/SCOPE: CPT | Mod: S$GLB,,, | Performed by: OBSTETRICS & GYNECOLOGY

## 2025-03-11 NOTE — PROCEDURES
Colposcopy    Date/Time: 3/11/2025 3:45 PM    Performed by: Karen Sue MD  Authorized by: Karen Sue MD    Consent obatined:  Prior to procedure the appropriate consent was completed and verified  Timeout:Immediately prior to procedure a time out was called to verify the correct patient, procedure, equipment, support staff and site/side marked as required  Assistants?: No      Colposcopy Site:  Cervix  Position:  Supine   Patient was prepped and draped in the normal sterile fashion.  Acrowhite Lesion: No    Atypical Vessels: No    Transformation Zone Adequate?: Yes    Biopsy?: Yes         Location:  Cervix ((12 00))  ECC Performed?: Yes    LEEP Performed?: No    Estimated blood loss (cc):  2   Patient tolerated the procedure well with no immediate complications.   Post-operative instructions were provided for the patient.   Patient was discharged and will follow up if any complications occur

## 2025-03-13 LAB
FINAL PATHOLOGIC DIAGNOSIS: NORMAL
GROSS: NORMAL
Lab: NORMAL

## 2025-03-15 ENCOUNTER — RESULTS FOLLOW-UP (OUTPATIENT)
Dept: OBSTETRICS AND GYNECOLOGY | Facility: HOSPITAL | Age: 40
End: 2025-03-15

## 2025-06-02 ENCOUNTER — OFFICE VISIT (OUTPATIENT)
Dept: PRIMARY CARE CLINIC | Facility: CLINIC | Age: 40
End: 2025-06-02
Payer: COMMERCIAL

## 2025-06-02 VITALS
HEIGHT: 62 IN | OXYGEN SATURATION: 99 % | WEIGHT: 209.44 LBS | BODY MASS INDEX: 38.54 KG/M2 | SYSTOLIC BLOOD PRESSURE: 120 MMHG | RESPIRATION RATE: 13 BRPM | DIASTOLIC BLOOD PRESSURE: 88 MMHG | HEART RATE: 69 BPM

## 2025-06-02 DIAGNOSIS — Z84.0 FAMILY HISTORY OF DISCOID LUPUS: ICD-10-CM

## 2025-06-02 DIAGNOSIS — Z00.00 ANNUAL PHYSICAL EXAM: ICD-10-CM

## 2025-06-02 DIAGNOSIS — I10 ESSENTIAL HYPERTENSION, BENIGN: ICD-10-CM

## 2025-06-02 DIAGNOSIS — K61.1 PERIRECTAL ABSCESS: Primary | ICD-10-CM

## 2025-06-02 DIAGNOSIS — I10 ESSENTIAL HYPERTENSION: ICD-10-CM

## 2025-06-02 PROCEDURE — G2211 COMPLEX E/M VISIT ADD ON: HCPCS | Mod: S$GLB,,, | Performed by: NURSE PRACTITIONER

## 2025-06-02 PROCEDURE — 3079F DIAST BP 80-89 MM HG: CPT | Mod: CPTII,S$GLB,, | Performed by: NURSE PRACTITIONER

## 2025-06-02 PROCEDURE — 4010F ACE/ARB THERAPY RXD/TAKEN: CPT | Mod: CPTII,S$GLB,, | Performed by: NURSE PRACTITIONER

## 2025-06-02 PROCEDURE — 99999 PR PBB SHADOW E&M-EST. PATIENT-LVL III: CPT | Mod: PBBFAC,,, | Performed by: NURSE PRACTITIONER

## 2025-06-02 PROCEDURE — 3008F BODY MASS INDEX DOCD: CPT | Mod: CPTII,S$GLB,, | Performed by: NURSE PRACTITIONER

## 2025-06-02 PROCEDURE — 99214 OFFICE O/P EST MOD 30 MIN: CPT | Mod: S$GLB,,, | Performed by: NURSE PRACTITIONER

## 2025-06-02 PROCEDURE — 1159F MED LIST DOCD IN RCRD: CPT | Mod: CPTII,S$GLB,, | Performed by: NURSE PRACTITIONER

## 2025-06-02 PROCEDURE — 3074F SYST BP LT 130 MM HG: CPT | Mod: CPTII,S$GLB,, | Performed by: NURSE PRACTITIONER

## 2025-06-02 RX ORDER — DOXYCYCLINE 100 MG/1
100 CAPSULE ORAL 2 TIMES DAILY
Qty: 14 CAPSULE | Refills: 0 | Status: SHIPPED | OUTPATIENT
Start: 2025-06-02 | End: 2025-06-09

## 2025-06-02 RX ORDER — MUPIROCIN 20 MG/G
OINTMENT TOPICAL 3 TIMES DAILY
Qty: 22 G | Refills: 2 | Status: SHIPPED | OUTPATIENT
Start: 2025-06-02

## 2025-06-05 ENCOUNTER — RESULTS FOLLOW-UP (OUTPATIENT)
Dept: PRIMARY CARE CLINIC | Facility: CLINIC | Age: 40
End: 2025-06-05

## 2025-07-11 ENCOUNTER — PATIENT MESSAGE (OUTPATIENT)
Dept: PRIMARY CARE CLINIC | Facility: CLINIC | Age: 40
End: 2025-07-11
Payer: COMMERCIAL

## 2025-07-11 RX ORDER — MALATHION 0 G/ML
LOTION TOPICAL ONCE
Qty: 59 ML | Refills: 0 | Status: SHIPPED | OUTPATIENT
Start: 2025-07-11 | End: 2025-07-11

## 2025-08-08 DIAGNOSIS — K61.1 PERIRECTAL ABSCESS: ICD-10-CM

## 2025-08-11 RX ORDER — MUPIROCIN 20 MG/G
OINTMENT TOPICAL 3 TIMES DAILY
Qty: 22 G | Refills: 2 | Status: SHIPPED | OUTPATIENT
Start: 2025-08-11

## 2025-08-15 RX ORDER — MALATHION 0 G/ML
LOTION TOPICAL
Qty: 59 ML | Refills: 0 | Status: SHIPPED | OUTPATIENT
Start: 2025-08-15

## (undated) DEVICE — STOCKINETTE DBL PLY ST 4X

## (undated) DEVICE — DRESSING XEROFORM FOIL PK 1X8

## (undated) DEVICE — BANDAGE MATRIX HK LOOP 2IN 5YD

## (undated) DEVICE — BANDAGE MATRIX HK LOOP 4IN 5YD

## (undated) DEVICE — GAUZE SPONGE 4X4 12PLY

## (undated) DEVICE — ADHESIVE DERMABOND ADVANCED

## (undated) DEVICE — TOURNIQUET SB QC DP 18X4IN

## (undated) DEVICE — SUT 4-0 ETHILON 18 PS-2

## (undated) DEVICE — DRESSING XEROFORM NONADH 1X8IN

## (undated) DEVICE — SPLINT PLASTER F.S 4INX15IN

## (undated) DEVICE — SUT VICRYL 4-0 RB1 27IN UD

## (undated) DEVICE — BANDAGE ACE NON LATEX 2IN

## (undated) DEVICE — WRAP COBAN SELF ADH ASST 3IN

## (undated) DEVICE — SUT MCRYL PLUS 4-0 PS2 27IN

## (undated) DEVICE — DRAPE U SPLIT SHEET 54X76IN

## (undated) DEVICE — PAD CAST SPECIALIST STRL 4

## (undated) DEVICE — NDL 22GA X1 1/2 REG BEVEL

## (undated) DEVICE — SOCKINETTE DOUBLE PLY 4X48IN

## (undated) DEVICE — Device

## (undated) DEVICE — DRAPE STERI-DRAPE 1000 17X11IN

## (undated) DEVICE — SLING ARM LARGE FOAM STRAP

## (undated) DEVICE — GLOVE PI ULTRA TOUCH G SURGEON

## (undated) DEVICE — BANDAGE ELASTIC ACE 2IN 10/CA

## (undated) DEVICE — UNDERGLOVES BIOGEL PI SIZE 8

## (undated) DEVICE — GLOVE BIOGEL SKINSENSE PI 7.5

## (undated) DEVICE — BANDAGE CONFORM 3IN STRL

## (undated) DEVICE — PAD CAST SPECIALIST 2X4

## (undated) DEVICE — SOL IRR SOD CHL .9% POUR

## (undated) DEVICE — DRAPE MINI C ARM STERILE

## (undated) DEVICE — BANDAGE ESMARK ELASTIC ST 4X9

## (undated) DEVICE — SUT FIBERWIRE 18IN BLUE 3-0

## (undated) DEVICE — CORD FOR BIPOLAR FORCEPS 12